# Patient Record
Sex: FEMALE | Race: OTHER | Employment: FULL TIME | URBAN - METROPOLITAN AREA
[De-identification: names, ages, dates, MRNs, and addresses within clinical notes are randomized per-mention and may not be internally consistent; named-entity substitution may affect disease eponyms.]

---

## 2017-10-02 ENCOUNTER — HOSPITAL ENCOUNTER (EMERGENCY)
Age: 32
Discharge: HOME | End: 2017-10-03
Attending: EMERGENCY MEDICINE
Payer: MEDICARE

## 2017-10-02 VITALS
HEIGHT: 61 IN | DIASTOLIC BLOOD PRESSURE: 85 MMHG | SYSTOLIC BLOOD PRESSURE: 124 MMHG | RESPIRATION RATE: 17 BRPM | HEART RATE: 92 BPM | TEMPERATURE: 98.2 F | BODY MASS INDEX: 34.93 KG/M2 | WEIGHT: 185 LBS | OXYGEN SATURATION: 100 %

## 2017-10-02 DIAGNOSIS — T30.0 BURN: Primary | ICD-10-CM

## 2017-10-02 DIAGNOSIS — R79.82 ELEVATED C-REACTIVE PROTEIN (CRP): ICD-10-CM

## 2017-10-02 DIAGNOSIS — L03.115 CELLULITIS OF RIGHT LOWER EXTREMITY: ICD-10-CM

## 2017-10-02 PROCEDURE — 86140 C-REACTIVE PROTEIN: CPT

## 2017-10-02 PROCEDURE — 80048 BASIC METABOLIC PNL TOTAL CA: CPT

## 2017-10-02 PROCEDURE — 99283 EMERGENCY DEPT VISIT LOW MDM: CPT

## 2017-10-02 PROCEDURE — 85025 COMPLETE CBC W/AUTO DIFF WBC: CPT

## 2017-10-02 ASSESSMENT — PAIN DESCRIPTION - ORIENTATION: ORIENTATION: RIGHT

## 2017-10-02 ASSESSMENT — PAIN SCALES - GENERAL: PAINLEVEL_OUTOF10: 8

## 2017-10-02 ASSESSMENT — PAIN DESCRIPTION - LOCATION: LOCATION: FOOT

## 2017-10-02 ASSESSMENT — PAIN DESCRIPTION - PAIN TYPE: TYPE: ACUTE PAIN
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
## 2017-10-03 LAB
ABSOLUTE EOS #: 0.5 K/UL (ref 0–0.4)
ABSOLUTE LYMPH #: 2.4 K/UL (ref 1–4.8)
ABSOLUTE MONO #: 0.5 K/UL (ref 0.1–1.2)
ANION GAP SERPL CALCULATED.3IONS-SCNC: 10 MMOL/L (ref 9–17)
BASOPHILS # BLD: 0 %
BASOPHILS ABSOLUTE: 0 K/UL (ref 0–0.2)
BUN BLDV-MCNC: 11 MG/DL (ref 6–20)
BUN/CREAT BLD: ABNORMAL (ref 9–20)
C-REACTIVE PROTEIN: 18.3 MG/L (ref 0–5)
CALCIUM SERPL-MCNC: 8.4 MG/DL (ref 8.6–10.4)
CHLORIDE BLD-SCNC: 104 MMOL/L (ref 98–107)
CO2: 26 MMOL/L (ref 20–31)
CREAT SERPL-MCNC: 0.65 MG/DL (ref 0.5–0.9)
DIFFERENTIAL TYPE: ABNORMAL
EOSINOPHILS RELATIVE PERCENT: 5 %
GFR AFRICAN AMERICAN: >60 ML/MIN
GFR NON-AFRICAN AMERICAN: >60 ML/MIN
GFR SERPL CREATININE-BSD FRML MDRD: ABNORMAL ML/MIN/{1.73_M2}
GFR SERPL CREATININE-BSD FRML MDRD: ABNORMAL ML/MIN/{1.73_M2}
GLUCOSE BLD-MCNC: 84 MG/DL (ref 70–99)
HCT VFR BLD CALC: 33.6 % (ref 36–46)
HEMOGLOBIN: 10.9 G/DL (ref 12–16)
LYMPHOCYTES # BLD: 25 %
MCH RBC QN AUTO: 26.3 PG (ref 26–34)
MCHC RBC AUTO-ENTMCNC: 32.3 G/DL (ref 31–37)
MCV RBC AUTO: 81.5 FL (ref 80–100)
MONOCYTES # BLD: 6 %
PDW BLD-RTO: 17.1 % (ref 12.5–15.4)
PLATELET # BLD: 370 K/UL (ref 140–450)
PLATELET ESTIMATE: ABNORMAL
PMV BLD AUTO: 8.2 FL (ref 6–12)
POTASSIUM SERPL-SCNC: 3.7 MMOL/L (ref 3.7–5.3)
RBC # BLD: 4.12 M/UL (ref 4–5.2)
RBC # BLD: ABNORMAL 10*6/UL
SEG NEUTROPHILS: 64 %
SEGMENTED NEUTROPHILS ABSOLUTE COUNT: 6.3 K/UL (ref 1.8–7.7)
SODIUM BLD-SCNC: 140 MMOL/L (ref 135–144)
WBC # BLD: 9.8 K/UL (ref 3.5–11)
WBC # BLD: ABNORMAL 10*3/UL

## 2017-10-03 PROCEDURE — 6370000000 HC RX 637 (ALT 250 FOR IP): Performed by: EMERGENCY MEDICINE

## 2017-10-03 RX ORDER — CEPHALEXIN 500 MG/1
500 CAPSULE ORAL ONCE
Status: COMPLETED | OUTPATIENT
Start: 2017-10-03 | End: 2017-10-03

## 2017-10-03 RX ORDER — SULFAMETHOXAZOLE AND TRIMETHOPRIM 800; 160 MG/1; MG/1
1 TABLET ORAL 2 TIMES DAILY
Qty: 14 TABLET | Refills: 0 | Status: SHIPPED | OUTPATIENT
Start: 2017-10-03 | End: 2017-10-10

## 2017-10-03 RX ORDER — CEPHALEXIN 500 MG/1
500 CAPSULE ORAL 4 TIMES DAILY
Qty: 28 CAPSULE | Refills: 0 | Status: SHIPPED | OUTPATIENT
Start: 2017-10-03 | End: 2017-10-10

## 2017-10-03 RX ORDER — BACITRACIN ZINC AND POLYMYXIN B SULFATE 500; 1000 [USP'U]/G; [USP'U]/G
OINTMENT TOPICAL
Qty: 15 G | Refills: 0 | Status: SHIPPED | OUTPATIENT
Start: 2017-10-03 | End: 2017-10-10

## 2017-10-03 RX ORDER — SULFAMETHOXAZOLE AND TRIMETHOPRIM 800; 160 MG/1; MG/1
1 TABLET ORAL ONCE
Status: COMPLETED | OUTPATIENT
Start: 2017-10-03 | End: 2017-10-03

## 2017-10-03 RX ADMIN — SULFAMETHOXAZOLE AND TRIMETHOPRIM 1 TABLET: 800; 160 TABLET ORAL at 01:09

## 2017-10-03 RX ADMIN — CEPHALEXIN 500 MG: 500 CAPSULE ORAL at 01:09

## 2017-10-03 ASSESSMENT — ENCOUNTER SYMPTOMS
VOMITING: 0
SHORTNESS OF BREATH: 0
NAUSEA: 0
COUGH: 0
ABDOMINAL PAIN: 0
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
## 2017-10-03 NOTE — ED PROVIDER NOTES
[facility] ED     Emergency Department     Faculty Attestation        I performed a history and physical examination of the patient and discussed management with the resident. I reviewed the residents note and agree with the documented findings and plan of care. Any areas of disagreement are noted on the chart. I was personally present for the key portions of any procedures. I have documented in the chart those procedures where I was not present during the key portions. I have reviewed the emergency nurses triage note. I agree with the chief complaint, past medical history, past surgical history, allergies, medications, social and family history as documented unless otherwise noted below. For mid-level providers such as nurse practitioners as well as physicians assistants:    I have personally seen and evaluated the patient. I find the patient's history and physical exam are consistent with NP/PA documentation. I agree with the care provided, treatment rendered, disposition, & follow-up plan. Additional findings are as noted. Vital Signs: /85  Pulse 92  Temp 98.2 °F (36.8 °C) (Oral)   Resp 17  Ht 5' 1\" (1.549 m)  Wt 185 lb (83.9 kg)  LMP 10/02/2017 (Exact Date)  SpO2 100%  BMI 34.96 kg/m2  PCP:  No primary care provider on file. Pertinent Comments:           Critical Care  None         Note, if the patient's blood pressure was elevated, and they have no history of hypertension, they were informed of the following: The patient may have Pre-hypertension/Hypertension: The patient has been informed that they may have pre-hypertension or Hypertension based on a blood pressure reading in the emergency department.  I recommend that the patient call the primary care provider listed on their discharge instructions or a physician of their choice this week to arrange follow up for further evaluation of possible pre-hypertension or Hypertension. (Please note that portions of this note were completed with a voice recognition program.  Efforts were made to edit the dictations but occasionally words are mis-transcribed. )    Jasson Mccain MD  Attending Emergency Medicine Physician             Mona Cam MD  10/02/17 6722
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
## 2018-08-29 ENCOUNTER — APPOINTMENT (OUTPATIENT)
Dept: GENERAL RADIOLOGY | Age: 33
End: 2018-08-29

## 2018-08-29 ENCOUNTER — HOSPITAL ENCOUNTER (EMERGENCY)
Age: 33
Discharge: HOME | End: 2018-08-29
Attending: EMERGENCY MEDICINE

## 2018-08-29 VITALS
BODY MASS INDEX: 40.44 KG/M2 | HEART RATE: 100 BPM | DIASTOLIC BLOOD PRESSURE: 67 MMHG | TEMPERATURE: 98.2 F | HEIGHT: 60 IN | OXYGEN SATURATION: 99 % | WEIGHT: 206 LBS | SYSTOLIC BLOOD PRESSURE: 106 MMHG | RESPIRATION RATE: 17 BRPM

## 2018-08-29 DIAGNOSIS — S62.101A CLOSED FRACTURE OF RIGHT WRIST, INITIAL ENCOUNTER: Primary | ICD-10-CM

## 2018-08-29 PROCEDURE — 99283 EMERGENCY DEPT VISIT LOW MDM: CPT

## 2018-08-29 PROCEDURE — 29125 APPL SHORT ARM SPLINT STATIC: CPT

## 2018-08-29 PROCEDURE — 73110 X-RAY EXAM OF WRIST: CPT

## 2018-08-29 PROCEDURE — 6370000000 HC RX 637 (ALT 250 FOR IP): Performed by: PHYSICIAN ASSISTANT

## 2018-08-29 RX ORDER — ACETAMINOPHEN AND CODEINE PHOSPHATE 300; 30 MG/1; MG/1
1 TABLET ORAL 3 TIMES DAILY PRN
Qty: 9 TABLET | Refills: 0 | Status: SHIPPED | OUTPATIENT
Start: 2018-08-29 | End: 2018-09-01

## 2018-08-29 RX ORDER — IBUPROFEN 600 MG/1
600 TABLET ORAL ONCE
Status: COMPLETED | OUTPATIENT
Start: 2018-08-29 | End: 2018-08-29

## 2018-08-29 RX ORDER — IBUPROFEN 600 MG/1
600 TABLET ORAL EVERY 6 HOURS PRN
Qty: 30 TABLET | Refills: 0 | Status: SHIPPED | OUTPATIENT
Start: 2018-08-29 | End: 2021-06-01 | Stop reason: HOSPADM

## 2018-08-29 RX ADMIN — IBUPROFEN 600 MG: 600 TABLET ORAL at 16:48

## 2018-08-29 ASSESSMENT — PAIN DESCRIPTION - ORIENTATION: ORIENTATION: RIGHT

## 2018-08-29 ASSESSMENT — PAIN DESCRIPTION - DESCRIPTORS: DESCRIPTORS: SHARP;THROBBING

## 2018-08-29 ASSESSMENT — PAIN SCALES - GENERAL: PAINLEVEL_OUTOF10: 7

## 2018-08-29 ASSESSMENT — PAIN DESCRIPTION - PAIN TYPE: TYPE: ACUTE PAIN

## 2018-08-29 ASSESSMENT — PAIN DESCRIPTION - LOCATION: LOCATION: WRIST
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
## 2018-08-30 ENCOUNTER — OFFICE VISIT (OUTPATIENT)
Dept: ORTHOPEDIC SURGERY | Age: 33
End: 2018-08-30

## 2018-08-30 DIAGNOSIS — S62.021A CLOSED COMMINUTED FRACTURE OF WAIST OF SCAPHOID OF RIGHT WRIST, INITIAL ENCOUNTER: ICD-10-CM

## 2018-08-30 DIAGNOSIS — M25.531 RIGHT WRIST PAIN: Primary | ICD-10-CM

## 2018-08-30 PROCEDURE — 99024 POSTOP FOLLOW-UP VISIT: CPT | Performed by: ORTHOPAEDIC SURGERY

## 2019-11-14 ENCOUNTER — APPOINTMENT (OUTPATIENT)
Dept: GENERAL RADIOLOGY | Age: 34
End: 2019-11-14
Payer: MEDICAID

## 2019-11-14 ENCOUNTER — HOSPITAL ENCOUNTER (EMERGENCY)
Age: 34
Discharge: HOME | End: 2019-11-14
Attending: EMERGENCY MEDICINE
Payer: MEDICAID

## 2019-11-14 VITALS
DIASTOLIC BLOOD PRESSURE: 87 MMHG | HEART RATE: 94 BPM | TEMPERATURE: 98.6 F | BODY MASS INDEX: 40.23 KG/M2 | OXYGEN SATURATION: 99 % | HEIGHT: 60 IN | RESPIRATION RATE: 18 BRPM | SYSTOLIC BLOOD PRESSURE: 124 MMHG

## 2019-11-14 DIAGNOSIS — R60.0 LEG EDEMA: Primary | ICD-10-CM

## 2019-11-14 LAB
-: NORMAL
ABSOLUTE EOS #: 0.24 K/UL (ref 0–0.44)
ABSOLUTE IMMATURE GRANULOCYTE: 0.06 K/UL (ref 0–0.3)
ABSOLUTE LYMPH #: 2.44 K/UL (ref 1.1–3.7)
ABSOLUTE MONO #: 0.81 K/UL (ref 0.1–1.2)
ALBUMIN SERPL-MCNC: 3.5 G/DL (ref 3.5–5.2)
ALBUMIN/GLOBULIN RATIO: 1.1 (ref 1–2.5)
ALP BLD-CCNC: 94 U/L (ref 35–104)
ALT SERPL-CCNC: 33 U/L (ref 5–33)
AMORPHOUS: NORMAL
ANION GAP SERPL CALCULATED.3IONS-SCNC: 12 MMOL/L (ref 9–17)
AST SERPL-CCNC: 25 U/L
BACTERIA: NORMAL
BASOPHILS # BLD: 0 % (ref 0–2)
BASOPHILS ABSOLUTE: <0.03 K/UL (ref 0–0.2)
BILIRUB SERPL-MCNC: <0.1 MG/DL (ref 0.3–1.2)
BILIRUBIN URINE: NEGATIVE
BNP INTERPRETATION: NORMAL
BUN BLDV-MCNC: 10 MG/DL (ref 6–20)
BUN/CREAT BLD: ABNORMAL (ref 9–20)
CALCIUM SERPL-MCNC: 8.5 MG/DL (ref 8.6–10.4)
CASTS UA: NORMAL /LPF (ref 0–8)
CHLORIDE BLD-SCNC: 105 MMOL/L (ref 98–107)
CO2: 22 MMOL/L (ref 20–31)
COLOR: YELLOW
COMMENT UA: ABNORMAL
CREAT SERPL-MCNC: 0.56 MG/DL (ref 0.5–0.9)
CRYSTALS, UA: NORMAL /HPF
DIFFERENTIAL TYPE: ABNORMAL
EOSINOPHILS RELATIVE PERCENT: 3 % (ref 1–4)
EPITHELIAL CELLS UA: NORMAL /HPF (ref 0–5)
GFR AFRICAN AMERICAN: >60 ML/MIN
GFR NON-AFRICAN AMERICAN: >60 ML/MIN
GFR SERPL CREATININE-BSD FRML MDRD: ABNORMAL ML/MIN/{1.73_M2}
GFR SERPL CREATININE-BSD FRML MDRD: ABNORMAL ML/MIN/{1.73_M2}
GLUCOSE BLD-MCNC: 81 MG/DL (ref 70–99)
GLUCOSE URINE: NEGATIVE
HCG(URINE) PREGNANCY TEST: NEGATIVE
HCT VFR BLD CALC: 31.5 % (ref 36.3–47.1)
HEMOGLOBIN: 9.3 G/DL (ref 11.9–15.1)
IMMATURE GRANULOCYTES: 1 %
KETONES, URINE: NEGATIVE
LEUKOCYTE ESTERASE, URINE: ABNORMAL
LYMPHOCYTES # BLD: 33 % (ref 24–43)
MCH RBC QN AUTO: 23.2 PG (ref 25.2–33.5)
MCHC RBC AUTO-ENTMCNC: 29.5 G/DL (ref 28.4–34.8)
MCV RBC AUTO: 78.6 FL (ref 82.6–102.9)
MONOCYTES # BLD: 11 % (ref 3–12)
MUCUS: NORMAL
NITRITE, URINE: NEGATIVE
NRBC AUTOMATED: 0 PER 100 WBC
OTHER OBSERVATIONS UA: NORMAL
PDW BLD-RTO: 16.1 % (ref 11.8–14.4)
PH UA: 6.5 (ref 5–8)
PLATELET # BLD: 362 K/UL (ref 138–453)
PLATELET ESTIMATE: ABNORMAL
PMV BLD AUTO: 10.3 FL (ref 8.1–13.5)
POTASSIUM SERPL-SCNC: 3.9 MMOL/L (ref 3.7–5.3)
PRO-BNP: 21 PG/ML
PROTEIN UA: NEGATIVE
RBC # BLD: 4.01 M/UL (ref 3.95–5.11)
RBC # BLD: ABNORMAL 10*6/UL
RBC UA: NORMAL /HPF (ref 0–4)
RENAL EPITHELIAL, UA: NORMAL /HPF
SEG NEUTROPHILS: 52 % (ref 36–65)
SEGMENTED NEUTROPHILS ABSOLUTE COUNT: 3.83 K/UL (ref 1.5–8.1)
SODIUM BLD-SCNC: 139 MMOL/L (ref 135–144)
SPECIFIC GRAVITY UA: 1.02 (ref 1–1.03)
TOTAL PROTEIN: 6.8 G/DL (ref 6.4–8.3)
TRICHOMONAS: NORMAL
TROPONIN INTERP: NORMAL
TROPONIN T: NORMAL NG/ML
TROPONIN, HIGH SENSITIVITY: <6 NG/L (ref 0–14)
TSH SERPL DL<=0.05 MIU/L-ACNC: 2.83 MIU/L (ref 0.3–5)
TURBIDITY: CLEAR
URINE HGB: NEGATIVE
UROBILINOGEN, URINE: NORMAL
WBC # BLD: 7.4 K/UL (ref 3.5–11.3)
WBC # BLD: ABNORMAL 10*3/UL
WBC UA: NORMAL /HPF (ref 0–5)
YEAST: NORMAL

## 2019-11-14 PROCEDURE — 81025 URINE PREGNANCY TEST: CPT

## 2019-11-14 PROCEDURE — 80053 COMPREHEN METABOLIC PANEL: CPT

## 2019-11-14 PROCEDURE — 84443 ASSAY THYROID STIM HORMONE: CPT

## 2019-11-14 PROCEDURE — 83880 ASSAY OF NATRIURETIC PEPTIDE: CPT

## 2019-11-14 PROCEDURE — 93970 EXTREMITY STUDY: CPT

## 2019-11-14 PROCEDURE — 93005 ELECTROCARDIOGRAM TRACING: CPT | Performed by: STUDENT IN AN ORGANIZED HEALTH CARE EDUCATION/TRAINING PROGRAM

## 2019-11-14 PROCEDURE — 84484 ASSAY OF TROPONIN QUANT: CPT

## 2019-11-14 PROCEDURE — 99285 EMERGENCY DEPT VISIT HI MDM: CPT

## 2019-11-14 PROCEDURE — 71046 X-RAY EXAM CHEST 2 VIEWS: CPT

## 2019-11-14 PROCEDURE — 81001 URINALYSIS AUTO W/SCOPE: CPT

## 2019-11-14 PROCEDURE — 85025 COMPLETE CBC W/AUTO DIFF WBC: CPT

## 2019-11-14 RX ORDER — METHADONE HYDROCHLORIDE 10 MG/1
120 TABLET ORAL DAILY
COMMUNITY

## 2019-11-14 ASSESSMENT — PAIN DESCRIPTION - DESCRIPTORS: DESCRIPTORS: ACHING

## 2019-11-14 ASSESSMENT — ENCOUNTER SYMPTOMS
VOMITING: 0
CHEST TIGHTNESS: 0
WHEEZING: 0
CONSTIPATION: 0
TROUBLE SWALLOWING: 0
DIARRHEA: 0
ABDOMINAL PAIN: 0
COUGH: 0
NAUSEA: 0
SHORTNESS OF BREATH: 0

## 2019-11-14 ASSESSMENT — PAIN DESCRIPTION - LOCATION: LOCATION: LEG;FOOT

## 2019-11-14 ASSESSMENT — PAIN DESCRIPTION - PAIN TYPE: TYPE: ACUTE PAIN

## 2019-11-14 ASSESSMENT — PAIN DESCRIPTION - ORIENTATION: ORIENTATION: RIGHT;LEFT

## 2019-11-14 ASSESSMENT — PAIN DESCRIPTION - FREQUENCY: FREQUENCY: CONTINUOUS

## 2019-11-14 ASSESSMENT — PAIN SCALES - GENERAL: PAINLEVEL_OUTOF10: 8

## 2019-11-16 LAB
EKG ATRIAL RATE: 89 BPM
EKG P AXIS: 10 DEGREES
EKG P-R INTERVAL: 140 MS
EKG Q-T INTERVAL: 380 MS
EKG QRS DURATION: 80 MS
EKG QTC CALCULATION (BAZETT): 462 MS
EKG R AXIS: 3 DEGREES
EKG T AXIS: 16 DEGREES
EKG VENTRICULAR RATE: 89 BPM

## 2019-11-16 PROCEDURE — 93010 ELECTROCARDIOGRAM REPORT: CPT | Performed by: INTERNAL MEDICINE

## 2020-09-15 PROBLEM — D64.9 ANEMIA: Status: ACTIVE | Noted: 2020-09-15

## 2020-09-15 PROBLEM — F11.988 OPIOID USE, UNSPECIFIED WITH OTHER OPIOID-INDUCED DISORDER (HCC): Status: ACTIVE | Noted: 2020-09-15

## 2020-09-15 PROBLEM — E66.01 MORBID OBESITY WITH BMI OF 40.0-44.9, ADULT (HCC): Status: ACTIVE | Noted: 2020-09-15

## 2020-09-16 PROBLEM — F11.91 HISTORY OF HEROIN USE: Status: ACTIVE | Noted: 2020-09-16

## 2020-10-19 ENCOUNTER — HOSPITAL ENCOUNTER (OUTPATIENT)
Age: 35
Setting detail: OBSERVATION
Discharge: HOME | End: 2020-10-21
Attending: EMERGENCY MEDICINE | Admitting: INTERNAL MEDICINE
Payer: MEDICAID

## 2020-10-19 ENCOUNTER — APPOINTMENT (OUTPATIENT)
Dept: GENERAL RADIOLOGY | Age: 35
End: 2020-10-19
Payer: MEDICAID

## 2020-10-19 PROBLEM — D64.9 SYMPTOMATIC ANEMIA: Status: ACTIVE | Noted: 2020-10-19

## 2020-10-19 PROBLEM — M79.89 LEG SWELLING: Status: ACTIVE | Noted: 2020-10-19

## 2020-10-19 PROBLEM — R06.02 SOB (SHORTNESS OF BREATH): Status: ACTIVE | Noted: 2020-10-19

## 2020-10-19 LAB
ABSOLUTE EOS #: 0.16 K/UL (ref 0–0.4)
ABSOLUTE IMMATURE GRANULOCYTE: ABNORMAL K/UL (ref 0–0.3)
ABSOLUTE LYMPH #: 2.45 K/UL (ref 1–4.8)
ABSOLUTE MONO #: 0.4 K/UL (ref 0.1–1.3)
ALBUMIN SERPL-MCNC: 3.7 G/DL (ref 3.5–5.2)
ALBUMIN/GLOBULIN RATIO: ABNORMAL (ref 1–2.5)
ALP BLD-CCNC: 103 U/L (ref 35–104)
ALT SERPL-CCNC: 15 U/L (ref 5–33)
ANION GAP SERPL CALCULATED.3IONS-SCNC: 9 MMOL/L (ref 9–17)
AST SERPL-CCNC: 15 U/L
BASOPHILS # BLD: 1 % (ref 0–2)
BASOPHILS ABSOLUTE: 0.08 K/UL (ref 0–0.2)
BILIRUB SERPL-MCNC: <0.15 MG/DL (ref 0.3–1.2)
BILIRUBIN DIRECT: <0.08 MG/DL
BILIRUBIN, INDIRECT: ABNORMAL MG/DL (ref 0–1)
BNP INTERPRETATION: NORMAL
BUN BLDV-MCNC: 9 MG/DL (ref 6–20)
BUN/CREAT BLD: ABNORMAL (ref 9–20)
CALCIUM SERPL-MCNC: 8.4 MG/DL (ref 8.6–10.4)
CHLORIDE BLD-SCNC: 103 MMOL/L (ref 98–107)
CO2: 25 MMOL/L (ref 20–31)
CREAT SERPL-MCNC: 0.59 MG/DL (ref 0.5–0.9)
DIFFERENTIAL TYPE: ABNORMAL
EOSINOPHILS RELATIVE PERCENT: 2 % (ref 0–4)
GFR AFRICAN AMERICAN: >60 ML/MIN
GFR NON-AFRICAN AMERICAN: >60 ML/MIN
GFR SERPL CREATININE-BSD FRML MDRD: ABNORMAL ML/MIN/{1.73_M2}
GFR SERPL CREATININE-BSD FRML MDRD: ABNORMAL ML/MIN/{1.73_M2}
GLOBULIN: ABNORMAL G/DL (ref 1.5–3.8)
GLUCOSE BLD-MCNC: 116 MG/DL (ref 70–99)
HCT VFR BLD CALC: 23.7 % (ref 36–46)
HEMOGLOBIN: 7.2 G/DL (ref 12–16)
IMMATURE GRANULOCYTES: ABNORMAL %
LIPASE: 15 U/L (ref 13–60)
LYMPHOCYTES # BLD: 31 % (ref 24–44)
MAGNESIUM: 1.9 MG/DL (ref 1.6–2.6)
MCH RBC QN AUTO: 18.2 PG (ref 26–34)
MCHC RBC AUTO-ENTMCNC: 30.4 G/DL (ref 31–37)
MCV RBC AUTO: 59.8 FL (ref 80–100)
MONOCYTES # BLD: 5 % (ref 1–7)
MORPHOLOGY: ABNORMAL
NRBC AUTOMATED: ABNORMAL PER 100 WBC
PDW BLD-RTO: 20.5 % (ref 11.5–14.9)
PLATELET # BLD: 358 K/UL (ref 150–450)
PLATELET ESTIMATE: ABNORMAL
PMV BLD AUTO: 8.3 FL (ref 6–12)
POTASSIUM SERPL-SCNC: 3.6 MMOL/L (ref 3.7–5.3)
PRO-BNP: 32 PG/ML
RBC # BLD: 3.96 M/UL (ref 4–5.2)
RBC # BLD: ABNORMAL 10*6/UL
SEG NEUTROPHILS: 61 % (ref 36–66)
SEGMENTED NEUTROPHILS ABSOLUTE COUNT: 4.81 K/UL (ref 1.3–9.1)
SODIUM BLD-SCNC: 137 MMOL/L (ref 135–144)
TOTAL CK: 223 U/L (ref 26–192)
TOTAL PROTEIN: 7 G/DL (ref 6.4–8.3)
TROPONIN INTERP: NORMAL
TROPONIN INTERP: NORMAL
TROPONIN T: NORMAL NG/ML
TROPONIN T: NORMAL NG/ML
TROPONIN, HIGH SENSITIVITY: <6 NG/L (ref 0–14)
TROPONIN, HIGH SENSITIVITY: <6 NG/L (ref 0–14)
WBC # BLD: 7.9 K/UL (ref 3.5–11)
WBC # BLD: ABNORMAL 10*3/UL

## 2020-10-19 PROCEDURE — 80076 HEPATIC FUNCTION PANEL: CPT

## 2020-10-19 PROCEDURE — G0378 HOSPITAL OBSERVATION PER HR: HCPCS

## 2020-10-19 PROCEDURE — 84484 ASSAY OF TROPONIN QUANT: CPT

## 2020-10-19 PROCEDURE — 93970 EXTREMITY STUDY: CPT

## 2020-10-19 PROCEDURE — 96372 THER/PROPH/DIAG INJ SC/IM: CPT

## 2020-10-19 PROCEDURE — 83690 ASSAY OF LIPASE: CPT

## 2020-10-19 PROCEDURE — 2580000003 HC RX 258: Performed by: NURSE PRACTITIONER

## 2020-10-19 PROCEDURE — 80048 BASIC METABOLIC PNL TOTAL CA: CPT

## 2020-10-19 PROCEDURE — 99284 EMERGENCY DEPT VISIT MOD MDM: CPT

## 2020-10-19 PROCEDURE — 71046 X-RAY EXAM CHEST 2 VIEWS: CPT

## 2020-10-19 PROCEDURE — 36415 COLL VENOUS BLD VENIPUNCTURE: CPT

## 2020-10-19 PROCEDURE — 93005 ELECTROCARDIOGRAM TRACING: CPT | Performed by: EMERGENCY MEDICINE

## 2020-10-19 PROCEDURE — 99285 EMERGENCY DEPT VISIT HI MDM: CPT

## 2020-10-19 PROCEDURE — 82550 ASSAY OF CK (CPK): CPT

## 2020-10-19 PROCEDURE — 85045 AUTOMATED RETICULOCYTE COUNT: CPT

## 2020-10-19 PROCEDURE — 85025 COMPLETE CBC W/AUTO DIFF WBC: CPT

## 2020-10-19 PROCEDURE — 83880 ASSAY OF NATRIURETIC PEPTIDE: CPT

## 2020-10-19 PROCEDURE — 6360000002 HC RX W HCPCS: Performed by: NURSE PRACTITIONER

## 2020-10-19 PROCEDURE — 83735 ASSAY OF MAGNESIUM: CPT

## 2020-10-19 RX ORDER — MAGNESIUM SULFATE 1 G/100ML
1 INJECTION INTRAVENOUS PRN
Status: DISCONTINUED | OUTPATIENT
Start: 2020-10-19 | End: 2020-10-21 | Stop reason: HOSPADM

## 2020-10-19 RX ORDER — NICOTINE 21 MG/24HR
1 PATCH, TRANSDERMAL 24 HOURS TRANSDERMAL DAILY PRN
Status: DISCONTINUED | OUTPATIENT
Start: 2020-10-19 | End: 2020-10-21 | Stop reason: HOSPADM

## 2020-10-19 RX ORDER — SODIUM CHLORIDE 0.9 % (FLUSH) 0.9 %
10 SYRINGE (ML) INJECTION EVERY 12 HOURS SCHEDULED
Status: DISCONTINUED | OUTPATIENT
Start: 2020-10-19 | End: 2020-10-21 | Stop reason: HOSPADM

## 2020-10-19 RX ORDER — POTASSIUM CHLORIDE 20 MEQ/1
40 TABLET, EXTENDED RELEASE ORAL PRN
Status: DISCONTINUED | OUTPATIENT
Start: 2020-10-19 | End: 2020-10-21 | Stop reason: HOSPADM

## 2020-10-19 RX ORDER — SODIUM CHLORIDE 0.9 % (FLUSH) 0.9 %
10 SYRINGE (ML) INJECTION PRN
Status: DISCONTINUED | OUTPATIENT
Start: 2020-10-19 | End: 2020-10-21 | Stop reason: HOSPADM

## 2020-10-19 RX ORDER — POTASSIUM CHLORIDE 7.45 MG/ML
10 INJECTION INTRAVENOUS PRN
Status: DISCONTINUED | OUTPATIENT
Start: 2020-10-19 | End: 2020-10-21 | Stop reason: HOSPADM

## 2020-10-19 RX ORDER — ACETAMINOPHEN 325 MG/1
650 TABLET ORAL EVERY 6 HOURS PRN
Status: DISCONTINUED | OUTPATIENT
Start: 2020-10-19 | End: 2020-10-21 | Stop reason: HOSPADM

## 2020-10-19 RX ORDER — ACETAMINOPHEN 650 MG/1
650 SUPPOSITORY RECTAL EVERY 6 HOURS PRN
Status: DISCONTINUED | OUTPATIENT
Start: 2020-10-19 | End: 2020-10-21 | Stop reason: HOSPADM

## 2020-10-19 RX ORDER — POLYETHYLENE GLYCOL 3350 17 G/17G
17 POWDER, FOR SOLUTION ORAL DAILY PRN
Status: DISCONTINUED | OUTPATIENT
Start: 2020-10-19 | End: 2020-10-21 | Stop reason: HOSPADM

## 2020-10-19 RX ADMIN — ENOXAPARIN SODIUM 30 MG: 30 INJECTION SUBCUTANEOUS at 23:51

## 2020-10-19 RX ADMIN — Medication 10 ML: at 23:19

## 2020-10-19 ASSESSMENT — ENCOUNTER SYMPTOMS
COUGH: 0
DIARRHEA: 0
VOMITING: 0
SHORTNESS OF BREATH: 1
BACK PAIN: 0
ABDOMINAL PAIN: 1

## 2020-10-19 ASSESSMENT — PAIN SCALES - GENERAL: PAINLEVEL_OUTOF10: 6

## 2020-10-20 ENCOUNTER — APPOINTMENT (OUTPATIENT)
Dept: CT IMAGING | Age: 35
End: 2020-10-20
Payer: MEDICAID

## 2020-10-20 LAB
-: ABNORMAL
ABO/RH: NORMAL
ABSOLUTE RETIC #: 0.1 M/UL (ref 0.02–0.1)
AMORPHOUS: ABNORMAL
ANION GAP SERPL CALCULATED.3IONS-SCNC: 8 MMOL/L (ref 9–17)
ANTIBODY SCREEN: NEGATIVE
ARM BAND NUMBER: NORMAL
BACTERIA: ABNORMAL
BILIRUBIN URINE: NEGATIVE
BLOOD BANK COMMENT: NORMAL
BUN BLDV-MCNC: 9 MG/DL (ref 6–20)
BUN/CREAT BLD: ABNORMAL (ref 9–20)
CALCIUM SERPL-MCNC: 8.3 MG/DL (ref 8.6–10.4)
CASTS UA: ABNORMAL /LPF
CHLORIDE BLD-SCNC: 105 MMOL/L (ref 98–107)
CO2: 25 MMOL/L (ref 20–31)
COLOR: YELLOW
COMMENT UA: ABNORMAL
CREAT SERPL-MCNC: 0.54 MG/DL (ref 0.5–0.9)
CRYSTALS, UA: ABNORMAL /HPF
EKG ATRIAL RATE: 83 BPM
EKG P AXIS: 6 DEGREES
EKG P-R INTERVAL: 148 MS
EKG Q-T INTERVAL: 410 MS
EKG QRS DURATION: 84 MS
EKG QTC CALCULATION (BAZETT): 481 MS
EKG R AXIS: 9 DEGREES
EKG T AXIS: 34 DEGREES
EKG VENTRICULAR RATE: 83 BPM
EPITHELIAL CELLS UA: ABNORMAL /HPF
EXPIRATION DATE: NORMAL
FERRITIN: 6 UG/L (ref 13–150)
FOLATE: 4.1 NG/ML
GFR AFRICAN AMERICAN: >60 ML/MIN
GFR NON-AFRICAN AMERICAN: >60 ML/MIN
GFR SERPL CREATININE-BSD FRML MDRD: ABNORMAL ML/MIN/{1.73_M2}
GFR SERPL CREATININE-BSD FRML MDRD: ABNORMAL ML/MIN/{1.73_M2}
GLUCOSE BLD-MCNC: 101 MG/DL (ref 70–99)
GLUCOSE URINE: NEGATIVE
HAPTOGLOBIN: 200 MG/DL (ref 30–200)
HCT VFR BLD CALC: 24.3 % (ref 36–46)
HCT VFR BLD CALC: 24.5 % (ref 36–46)
HEMOGLOBIN: 7.1 G/DL (ref 12–16)
HEMOGLOBIN: 7.2 G/DL (ref 12–16)
IMMATURE RETIC FRACT: ABNORMAL %
INR BLD: 1
IRON SATURATION: 8 % (ref 20–55)
IRON: 26 UG/DL (ref 37–145)
KETONES, URINE: NEGATIVE
LEUKOCYTE ESTERASE, URINE: ABNORMAL
LV EF: 55 %
LVEF MODALITY: NORMAL
MCH RBC QN AUTO: 17.6 PG (ref 26–34)
MCHC RBC AUTO-ENTMCNC: 29.5 G/DL (ref 31–37)
MCV RBC AUTO: 59.7 FL (ref 80–100)
MUCUS: ABNORMAL
NITRITE, URINE: NEGATIVE
NRBC AUTOMATED: ABNORMAL PER 100 WBC
OTHER OBSERVATIONS UA: ABNORMAL
PARTIAL THROMBOPLASTIN TIME: 32.7 SEC (ref 24–36)
PATHOLOGIST REVIEW: NORMAL
PATHOLOGIST REVIEW: NORMAL
PDW BLD-RTO: 21 % (ref 11.5–14.9)
PH UA: 6.5 (ref 5–8)
PLATELET # BLD: 349 K/UL (ref 150–450)
PMV BLD AUTO: 8.4 FL (ref 6–12)
POTASSIUM SERPL-SCNC: 3.9 MMOL/L (ref 3.7–5.3)
PROTEIN UA: NEGATIVE
PROTHROMBIN TIME: 13.3 SEC (ref 11.8–14.6)
RBC # BLD: 4.08 M/UL (ref 4–5.2)
RBC UA: ABNORMAL /HPF
RENAL EPITHELIAL, UA: ABNORMAL /HPF
RETIC %: 2.5 % (ref 0.5–2)
RETIC HEMOGLOBIN: ABNORMAL PG (ref 28.2–35.7)
SODIUM BLD-SCNC: 138 MMOL/L (ref 135–144)
SPECIFIC GRAVITY UA: 1.01 (ref 1–1.03)
TOTAL IRON BINDING CAPACITY: 331 UG/DL (ref 250–450)
TRICHOMONAS: ABNORMAL
TSH SERPL DL<=0.05 MIU/L-ACNC: 1.6 MIU/L (ref 0.3–5)
TURBIDITY: ABNORMAL
UNSATURATED IRON BINDING CAPACITY: 305 UG/DL (ref 112–347)
URINE HGB: NEGATIVE
UROBILINOGEN, URINE: NORMAL
VITAMIN B-12: 573 PG/ML (ref 232–1245)
WBC # BLD: 6.8 K/UL (ref 3.5–11)
WBC UA: ABNORMAL /HPF
YEAST: ABNORMAL

## 2020-10-20 PROCEDURE — 6360000002 HC RX W HCPCS: Performed by: NURSE PRACTITIONER

## 2020-10-20 PROCEDURE — 99255 IP/OBS CONSLTJ NEW/EST HI 80: CPT | Performed by: INTERNAL MEDICINE

## 2020-10-20 PROCEDURE — 83010 ASSAY OF HAPTOGLOBIN QUANT: CPT

## 2020-10-20 PROCEDURE — 6360000004 HC RX CONTRAST MEDICATION: Performed by: INTERNAL MEDICINE

## 2020-10-20 PROCEDURE — 82746 ASSAY OF FOLIC ACID SERUM: CPT

## 2020-10-20 PROCEDURE — 85014 HEMATOCRIT: CPT

## 2020-10-20 PROCEDURE — 83540 ASSAY OF IRON: CPT

## 2020-10-20 PROCEDURE — G0378 HOSPITAL OBSERVATION PER HR: HCPCS

## 2020-10-20 PROCEDURE — 84443 ASSAY THYROID STIM HORMONE: CPT

## 2020-10-20 PROCEDURE — 2580000003 HC RX 258: Performed by: NURSE PRACTITIONER

## 2020-10-20 PROCEDURE — 85610 PROTHROMBIN TIME: CPT

## 2020-10-20 PROCEDURE — 36415 COLL VENOUS BLD VENIPUNCTURE: CPT

## 2020-10-20 PROCEDURE — 6370000000 HC RX 637 (ALT 250 FOR IP): Performed by: INTERNAL MEDICINE

## 2020-10-20 PROCEDURE — 81001 URINALYSIS AUTO W/SCOPE: CPT

## 2020-10-20 PROCEDURE — 82607 VITAMIN B-12: CPT

## 2020-10-20 PROCEDURE — 96365 THER/PROPH/DIAG IV INF INIT: CPT

## 2020-10-20 PROCEDURE — 86901 BLOOD TYPING SEROLOGIC RH(D): CPT

## 2020-10-20 PROCEDURE — 82728 ASSAY OF FERRITIN: CPT

## 2020-10-20 PROCEDURE — 99223 1ST HOSP IP/OBS HIGH 75: CPT | Performed by: INTERNAL MEDICINE

## 2020-10-20 PROCEDURE — 96372 THER/PROPH/DIAG INJ SC/IM: CPT

## 2020-10-20 PROCEDURE — 85018 HEMOGLOBIN: CPT

## 2020-10-20 PROCEDURE — 6360000002 HC RX W HCPCS: Performed by: INTERNAL MEDICINE

## 2020-10-20 PROCEDURE — 93306 TTE W/DOPPLER COMPLETE: CPT

## 2020-10-20 PROCEDURE — 80048 BASIC METABOLIC PNL TOTAL CA: CPT

## 2020-10-20 PROCEDURE — 86900 BLOOD TYPING SEROLOGIC ABO: CPT

## 2020-10-20 PROCEDURE — 74177 CT ABD & PELVIS W/CONTRAST: CPT

## 2020-10-20 PROCEDURE — 96375 TX/PRO/DX INJ NEW DRUG ADDON: CPT

## 2020-10-20 PROCEDURE — 85730 THROMBOPLASTIN TIME PARTIAL: CPT

## 2020-10-20 PROCEDURE — 2580000003 HC RX 258: Performed by: INTERNAL MEDICINE

## 2020-10-20 PROCEDURE — 86850 RBC ANTIBODY SCREEN: CPT

## 2020-10-20 PROCEDURE — 85027 COMPLETE CBC AUTOMATED: CPT

## 2020-10-20 PROCEDURE — 83550 IRON BINDING TEST: CPT

## 2020-10-20 RX ORDER — PRAZOSIN HYDROCHLORIDE 1 MG/1
1 CAPSULE ORAL NIGHTLY
Status: DISCONTINUED | OUTPATIENT
Start: 2020-10-20 | End: 2020-10-21 | Stop reason: HOSPADM

## 2020-10-20 RX ORDER — FUROSEMIDE 10 MG/ML
20 INJECTION INTRAMUSCULAR; INTRAVENOUS ONCE
Status: COMPLETED | OUTPATIENT
Start: 2020-10-20 | End: 2020-10-20

## 2020-10-20 RX ORDER — SODIUM CHLORIDE 0.9 % (FLUSH) 0.9 %
10 SYRINGE (ML) INJECTION PRN
Status: DISCONTINUED | OUTPATIENT
Start: 2020-10-20 | End: 2020-10-21 | Stop reason: HOSPADM

## 2020-10-20 RX ORDER — 0.9 % SODIUM CHLORIDE 0.9 %
80 INTRAVENOUS SOLUTION INTRAVENOUS ONCE
Status: COMPLETED | OUTPATIENT
Start: 2020-10-20 | End: 2020-10-20

## 2020-10-20 RX ORDER — METHADONE HYDROCHLORIDE 10 MG/5ML
125 SOLUTION ORAL DAILY
Status: DISCONTINUED | OUTPATIENT
Start: 2020-10-20 | End: 2020-10-21 | Stop reason: HOSPADM

## 2020-10-20 RX ORDER — FAMOTIDINE 20 MG/1
20 TABLET, FILM COATED ORAL 2 TIMES DAILY
Status: DISCONTINUED | OUTPATIENT
Start: 2020-10-20 | End: 2020-10-21 | Stop reason: HOSPADM

## 2020-10-20 RX ORDER — ONDANSETRON 2 MG/ML
4 INJECTION INTRAMUSCULAR; INTRAVENOUS EVERY 6 HOURS PRN
Status: DISCONTINUED | OUTPATIENT
Start: 2020-10-20 | End: 2020-10-21 | Stop reason: HOSPADM

## 2020-10-20 RX ORDER — QUETIAPINE FUMARATE 50 MG/1
75 TABLET, FILM COATED ORAL NIGHTLY
Status: DISCONTINUED | OUTPATIENT
Start: 2020-10-20 | End: 2020-10-21 | Stop reason: HOSPADM

## 2020-10-20 RX ADMIN — FAMOTIDINE 20 MG: 20 TABLET ORAL at 11:17

## 2020-10-20 RX ADMIN — IOHEXOL 50 ML: 240 INJECTION, SOLUTION INTRATHECAL; INTRAVASCULAR; INTRAVENOUS; ORAL at 21:14

## 2020-10-20 RX ADMIN — Medication 10 ML: at 23:01

## 2020-10-20 RX ADMIN — SERTRALINE HYDROCHLORIDE 50 MG: 50 TABLET ORAL at 11:17

## 2020-10-20 RX ADMIN — FUROSEMIDE 20 MG: 10 INJECTION, SOLUTION INTRAMUSCULAR; INTRAVENOUS at 09:09

## 2020-10-20 RX ADMIN — Medication 10 ML: at 23:40

## 2020-10-20 RX ADMIN — QUETIAPINE FUMARATE 75 MG: 50 TABLET ORAL at 23:27

## 2020-10-20 RX ADMIN — ENOXAPARIN SODIUM 30 MG: 30 INJECTION SUBCUTANEOUS at 23:27

## 2020-10-20 RX ADMIN — IOPAMIDOL 75 ML: 755 INJECTION, SOLUTION INTRAVENOUS at 23:01

## 2020-10-20 RX ADMIN — Medication 10 ML: at 11:18

## 2020-10-20 RX ADMIN — SODIUM CHLORIDE 200 MG: 9 INJECTION, SOLUTION INTRAVENOUS at 11:19

## 2020-10-20 RX ADMIN — METHADONE HYDROCHLORIDE 125 MG: 10 SOLUTION ORAL at 13:14

## 2020-10-20 RX ADMIN — ENOXAPARIN SODIUM 30 MG: 30 INJECTION SUBCUTANEOUS at 09:10

## 2020-10-20 RX ADMIN — SODIUM CHLORIDE 80 ML: 9 INJECTION, SOLUTION INTRAVENOUS at 23:00

## 2020-10-20 RX ADMIN — FAMOTIDINE 20 MG: 20 TABLET ORAL at 23:27

## 2020-10-20 ASSESSMENT — PAIN SCALES - GENERAL
PAINLEVEL_OUTOF10: 0
PAINLEVEL_OUTOF10: 1

## 2020-10-20 ASSESSMENT — ENCOUNTER SYMPTOMS
ABDOMINAL PAIN: 0
SHORTNESS OF BREATH: 1
COUGH: 0

## 2020-10-20 NOTE — FLOWSHEET NOTE
10/20/20 1458   Encounter Summary   Services provided to: Patient   Support System Significant other   Continue Visiting   (10-20-20)   Complexity of Encounter Moderate   Length of Encounter 15 minutes   Spiritual Assessment Completed Yes   Spiritual/Episcopal   Type Spiritual support   Assessment Calm; Approachable; Anxious; Coping   Intervention Active listening;Explored feelings, thoughts, concerns;Prayer;Sustaining presence/ Ministry of presence   Outcome Comfort;Expressed gratitude; Hopeful

## 2020-10-20 NOTE — CARE COORDINATION
HGB CASE MANAGEMENT NOTE:    Admission Date:  10/19/2020 Homero Cantu is a 37 y.o.  female    Admitted for : Symptomatic anemia [D64.9]    Met with:  Patient    PCP:  None, Wants to be set up w/ Dr. Laurence Martinez, at [facility]:  [address]:  independently at home w/           Current Services PTA:  Yes, [facility] on [address], for Methadone    Is patient agreeable to VNS: No    Freedom of choice provided:  No    List of [address] provided: No    VNS chosen:  No    DME:  straight cane    Home Oxygen: No    Nebulizer: No    CPAP/BIPAP: No    Supplier: N/A    Potential Assistance Needed: Yes, Needs PCP, wants [place]    SNF needed: No    Freedom of choice and list provided: NA    Pharmacy:  [address] on [place]       Does Patient want to use MEDS to BEDS? No    Is patient currently receiving oral anticoagulation therapy? No    Is the Patient an [facility] with Readmission Risk Score greater than 14%? No  If yes, pt needs a follow up appointment made within 7 days. Family Members/Caregivers that pt would like involved in their care:    Yes    If yes, list name here:  , Maribeth Minors:               Is patient in Isolation/One on One/Altered Mental Status? No  If yes, skip next question. If no, would they like an I-Pad to  use? No  If yes, call [phone]. Discharge Plan:  10/20/20 [facility] Pt. Lives in 1 story home w/ . DME, Cane, [employer] [place], will need orders. Denies VNS. New PCP w/ Dr. Laurence Martinez, w/ apt made. CXR, Echo, PT/OT, HGB 7.2. Follows at [facility] on [address], for Methadone. Denies other needs.  Will Follow//KB                 Electronically signed by: Martha Kelley RN on 10/20/2020 at 10:01 AM

## 2020-10-20 NOTE — PROGRESS NOTES
Reached Dr. Akhil Rodriguez via [facility] message for hem/onc consult regarding severe iron deficient anemia. Dr. Akhil Rodriguez aware of consult.

## 2020-10-20 NOTE — CARE COORDINATION
Writer spoke to Dr. Joi Brito, in regards to Pt's request for a Shower Chair. Dr. Joi Brito does not feel the need at this time & will wait to see what therapy suggests. Pt is up per self around the room, without difficulty.

## 2020-10-20 NOTE — ED NOTES
Admission Dx: anemia    Pts Chief Complaints on Arrival: swelling     ADL's - Self-care    Pending Diagnostics:  n/a    Residence PTA: single story home    Special Considerations/Circumstances:  n/a    Vitals: Current vital signs:  /73   Pulse 85   Temp 98.4 °F (36.9 °C) (Oral)   Resp 17   Ht 5' 1\" (1.549 m)   Wt 270 lb (122.5 kg)   LMP 09/30/2020   SpO2 100%   BMI 51.02 kg/m²                [facility]  10/19/20 2240

## 2020-10-20 NOTE — PROGRESS NOTES
RN called [facility] to confirm methadone dose. There was no answer, RN left a message. Waiting for response.

## 2020-10-20 NOTE — CARE COORDINATION
Pt has been scheduled for a new patient appointment with Dr. Jody Baldwin on November 11 at 10:30. Pt verbalizes understanding that they must bring photo ID, insurance card, and medication list to appointment. Pt also understands that they are to arrive 15 minutes prior to appointment. Appointment information entered into discharge navigator.     Electronically signed by Adriana Corea RN on 10/20/20 at 10:10 AM EDT

## 2020-10-20 NOTE — PROGRESS NOTES
RN spoke with Margarette Corral from the [facility] to verify patient's dose of methadone. Patient's reported dose is 125mg of oral liquid.

## 2020-10-20 NOTE — ED PROVIDER NOTES
negative. PASTMEDICAL HISTORY     Past Medical History:   Diagnosis Date    History of heroin use 9/16/2020     SURGICAL HISTORY     History reviewed. No pertinent surgical history. CURRENT MEDICATIONS       Previous Medications    IBUPROFEN (ADVIL;MOTRIN) 600 MG TABLET    Take 1 tablet by mouth every 6 hours as needed for Pain    METHADONE (DOLOPHINE) 10 MG TABLET    Take 100 mg by mouth every 4 hours as needed for Pain. MIRTAZAPINE (REMERON) 15 MG TABLET    Take 15 mg by mouth nightly    PRAZOSIN (MINIPRESS) 1 MG CAPSULE    Take 1 capsule by mouth daily    QUETIAPINE (SEROQUEL) 50 MG TABLET    Take 1 tablet by mouth daily    SERTRALINE (ZOLOFT) 50 MG TABLET    Take 1 tablet by mouth daily     ALLERGIES     has No Known Allergies. FAMILY HISTORY     has no family status information on file. SOCIAL HISTORY       Social History     Tobacco Use    Smoking status: Current Every Day Smoker     Packs/day: 0.50     Types: Cigarettes    Smokeless tobacco: Never Used   Substance Use Topics    Alcohol use: No    Drug use: No     Comment: on Methadone     PHYSICAL EXAM     INITIAL VITALS: /85   Pulse 88   Temp 98.4 °F (36.9 °C) (Oral)   Resp 17   Ht 5' 1\" (1.549 m)   Wt 270 lb (122.5 kg)   LMP 09/30/2020   SpO2 100%   BMI 51.02 kg/m²    Physical Exam  Vitals signs and nursing note reviewed. Constitutional:       General: She is not in acute distress. Appearance: She is well-developed. Comments: Morbidly obese   HENT:      Head: Normocephalic and atraumatic. Eyes:      Conjunctiva/sclera: Conjunctivae normal.   Neck:      Musculoskeletal: Neck supple. Cardiovascular:      Rate and Rhythm: Normal rate and regular rhythm. Pulses: Normal pulses. Heart sounds: No murmur. No friction rub. Pulmonary:      Effort: Pulmonary effort is normal. No respiratory distress. Breath sounds: Normal breath sounds. No stridor. No wheezing or rhonchi.    Abdominal:      General: There is no distension. Palpations: Abdomen is soft. Tenderness: There is no abdominal tenderness. There is no guarding or rebound. Musculoskeletal:      Comments: No bilateral lower extremity swelling, obese legs DP pulses bilaterally   Skin:     General: Skin is warm and dry. Capillary Refill: Capillary refill takes less than 2 seconds. Neurological:      Mental Status: She is alert. DIAGNOSTIC RESULTS   RADIOLOGY:All plain film, CT, MRI, and formal ultrasound images (except ED bedside ultrasound) are read by the radiologist, see reports below, unless otherwisenoted in MDM or here. XR CHEST (2 VW)   Final Result   No active cardiopulmonary disease         VL Lower Extremity Bilateral Venous Duplex    (Results Pending)     LABS: All lab results were reviewed by myself, and all abnormals are listed below. Labs Reviewed   CBC WITH AUTO DIFFERENTIAL - Abnormal; Notable for the following components:       Result Value    RBC 3.96 (*)     Hemoglobin 7.2 (*)     Hematocrit 23.7 (*)     MCV 59.8 (*)     MCH 18.2 (*)     MCHC 30.4 (*)     RDW 20.5 (*)     All other components within normal limits   BASIC METABOLIC PANEL - Abnormal; Notable for the following components:    Glucose 116 (*)     Calcium 8.4 (*)     Potassium 3.6 (*)     All other components within normal limits   HEPATIC FUNCTION PANEL - Abnormal; Notable for the following components: Total Bilirubin <0.15 (*)     All other components within normal limits   CK - Abnormal; Notable for the following components: Total  (*)     All other components within normal limits   BRAIN NATRIURETIC PEPTIDE   TROPONIN   MAGNESIUM   LIPASE   TROPONIN   PERIPHERAL BLOOD SMEAR, PATH REVIEW       EMERGENCY DEPARTMENTCOURSE:     Differential diagnosis includes nephrotic syndrome, DVT, heart failure exacerbation, obesity. I spoke to the patient she has a history of heroin use she is still on methadone she is not using anymore.   She states that she is not taking anything for pain she takes 120 mg of methadone I did educate her that methadone is a pretty strong pain medication. She does have significant risk factors with her obesity so will perform a an ACS work-up as well as rule out DVT but since this has been going on for so long most likely chronic in nature. Patient has a prolonged QTC most likely secondary to her large amount of methadone use.    9:26 PM EDT  Patient has an elevated CK of 223. I wanted give her IV fluids, however her hemoglobin is 7.2. This went down from 9.3. I went back and spoke to the patient a little bit more she denies having any heavy. She denies any black or bloody stools. I did see that she does have a diagnosis of iron deficiency anemia in the past but she does not recollect to this. In terms of her other work-up a duplex of the bilateral lower extremities was negative for DVT troponin BNP is not elevated there is no elevation in creatinine her potassium is mildly low at 3.6 but with her elevation in CK I do not want to supplement her potassium. She has no leukocytosis. I spoke to the patient about discharged with outpatient follow-up versus admission the patient states that she would rather be admitted I did speak to [address] [place] [address] practitioner who will admit the patient. EKG: All EKG's are interpreted by the Emergency Department Physician who either signs or Co-signs this chart in the absence of a cardiologist.  Normal sinus rhythm with a heart rate of 83 bpm prolonged QTC of 481 ms normal axis no acute ST or T wave changes    Vitals:    Vitals:    10/19/20 1852   BP: 115/85   Pulse: 88   Resp: 17   Temp: 98.4 °F (36.9 °C)   TempSrc: Oral   SpO2: 100%   Weight: 270 lb (122.5 kg)   Height: 5' 1\" (1.549 m)         FINAL IMPRESSION      1. Anemia, unspecified type    2.  Non-traumatic rhabdomyolysis         DISPOSITION/PLAN   DISPOSITION Decision To Admit 10/19/2020 09:26:01 PM    Bob Oglesby MD  Attending

## 2020-10-20 NOTE — PLAN OF CARE
Problem: Falls - Risk of:  Goal: Will remain free from falls  Description: Will remain free from falls  Outcome: Ongoing  Note: Patient remained free from falls. Call light within reach. Problem: Infection:  Goal: Will remain free from infection  Description: Will remain free from infection  Outcome: Ongoing  Note: No new signs or symptoms of infection noted this shift. Problem: Safety:  Goal: Free from accidental physical injury  Description: Free from accidental physical injury  Outcome: Ongoing  Note: Patient free from accidental physical injury this shift. Problem: Daily Care:  Goal: Daily care needs are met  Description: Daily care needs are met  Outcome: Ongoing  Note: ADLs met throughout shift.

## 2020-10-20 NOTE — CONSULTS
_                         Today's Date: 10/20/2020  Patient Name: Jensen Ferraro  Date of admission: 10/19/2020  7:40 PM  Patient's age: 37 y.o., 1985  Admission Dx: Symptomatic anemia [D64.9]      Requesting Physician: Zehra Fontanez MD    CHIEF COMPLAINT: Excessive weakness and fatigue. Consult for iron deficiency anemia. History Obtained From:  patient, electronic medical record    HISTORY OF PRESENT ILLNESS:      The patient is a 37 y.o.  female who is admitted to the hospital for further management of severe anemia. The patient has morbid obesity. She has history of heroin use. Patient has increasing weakness and fatigue for the last few months. Getting worse recently. She has shortness of breath. She has increasing lower extremities edema. No fever. No cough or congestion. Patient denies any GI bleeding. No melena or hematochezia. No hematemesis. She has heavy menstrual periods with passage of clots. Patient smokes only 3 to 4 cigarettes a day. Denies alcohol drinking. History of heroine abuse. Past Medical History:   has a past medical history of History of heroin use. Past Surgical History:   has no past surgical history on file. Family History: family history is not on file. Social History:   reports that she has been smoking cigarettes. She has been smoking about 0.50 packs per day. She has never used smokeless tobacco. She reports that she does not drink alcohol or use drugs. Medications:    Prior to Admission medications    Medication Sig Start Date End Date Taking?  Authorizing Provider   sertraline (ZOLOFT) 50 MG tablet Take 1 tablet by mouth daily 9/2/20  Yes Historical Provider, MD   QUETIAPINE FUMARATE PO Take 75 mg by mouth nightly  9/2/20  Yes Historical Provider, MD   prazosin (MINIPRESS) 1 MG capsule Take 1 capsule by mouth nightly  9/2/20  Yes Historical Provider, MD   methadone (DOLOPHINE) 10 MG tablet Take 125 mg by mouth daily.     Yes Historical Provider, MD   ibuprofen (ADVIL;MOTRIN) 600 MG tablet Take 1 tablet by mouth every 6 hours as needed for Pain  Patient taking differently: Take 400 mg by mouth every 6 hours as needed for Pain  8/29/18  Yes Beti Zhou PA-C     Current Facility-Administered Medications   Medication Dose Route Frequency Provider Last Rate Last Dose    perflutren lipid microspheres (DEFINITY) injection 2.2 mg  2 mL Intravenous ONCE PRN Amna Brito MD        iron sucrose (VENOFER) 200 mg in sodium chloride 0.9 % 100 mL IVPB  200 mg Intravenous Q24H Ciara Manriquez MD   Stopped at 10/20/20 1219    prazosin (MINIPRESS) capsule 1 mg  1 mg Oral Nightly Ciara Manriquez MD        sertraline (ZOLOFT) tablet 50 mg  50 mg Oral Daily Ciara Manriquez MD   50 mg at 10/20/20 1117    QUEtiapine (SEROQUEL) tablet 75 mg  75 mg Oral Nightly Ciara Manriquez MD        famotidine (PEPCID) tablet 20 mg  20 mg Oral BID Ciara Manriquez MD   20 mg at 10/20/20 1117    methadone 10 MG/5ML solution 125 mg  125 mg Oral Daily Ciara Manriquez MD   125 mg at 10/20/20 1314    ondansetron (ZOFRAN) injection 4 mg  4 mg Intravenous Q6H PRN Ciara Manriquez MD        iohexol (OMNIPAQUE 240) injection 50 mL  50 mL Oral ONCE PRN Ciara Manriquez MD        sodium chloride flush 0.9 % injection 10 mL  10 mL Intravenous 2 times per day Alverna Joan, APRN - CNP   10 mL at 10/20/20 1118    sodium chloride flush 0.9 % injection 10 mL  10 mL Intravenous PRN Alverna Joan, APRN - CNP        potassium chloride (KLOR-CON M) extended release tablet 40 mEq  40 mEq Oral PRN Alverna Joan, APRN - CNP        Or    potassium bicarb-citric acid (EFFER-K) effervescent tablet 40 mEq  40 mEq Oral PRN Alverna Joan, APRN - CNP        Or    potassium chloride 10 mEq/100 mL IVPB (Peripheral Line)  10 mEq Intravenous PRN Alverna Joan, APRN - CNP        magnesium sulfate 1 g in dextrose 5% 100 mL IVPB  1 g Intravenous PRN Alverna Joan, APRN - CNP  acetaminophen (TYLENOL) tablet 650 mg  650 mg Oral Q6H PRN Remya Susaner, APRN - CNP        Or    acetaminophen (TYLENOL) suppository 650 mg  650 mg Rectal Q6H PRN Remya Cocker, APRN - CNP        polyethylene glycol (GLYCOLAX) packet 17 g  17 g Oral Daily PRN Remya Susaner, APRN - CNP        nicotine (NICODERM CQ) 21 MG/24HR 1 patch  1 patch Transdermal Daily PRN Remya Susaner, APRN - CNP        enoxaparin (LOVENOX) injection 30 mg  30 mg Subcutaneous BID Spencer Almanza, APRN - CNP   30 mg at 10/20/20 8549       Allergies:  Patient has no known allergies. REVIEW OF SYSTEMS:      · General: Positive for weakness and fatigue. . No unanticipated weight loss or decreased appetite. No fever or chills. · Eyes: No blurred vision, eye pain or double vision. · Ears: No hearing problems or drainage. No tinnitus. · Throat: No sore throat, problems with swallowing or dysphagia. · Respiratory: No cough, sputum or hemoptysis. Positive for shortness of breath. No pleuritic chest pain. · Cardiovascular: No chest pain, orthopnea or PND. No lower extremity edema. No palpitation. · Gastrointestinal: No problems with swallowing. No abdominal pain or bloating. No nausea or vomiting. No diarrhea or constipation. No GI bleeding. · Genitourinary: No dysuria, hematuria, frequency or urgency. · Musculoskeletal: No muscle aches or pains. No limitation of movement. No back pain. No gait disturbance, No joint complaints. · Dermatologic: No skin rashes or pruritus. No skin lesions or discolorations. · Psychiatric: No depression, anxiety, or stress or signs of schizophrenia. No change in mood or affect. · Hematologic: No history of bleeding tendency. No bruises or ecchymosis. No history of clotting problems. · Infectious disease: No fever, chills or frequent infections. · Endocrine: No polydipsia or polyuria. No temperature intolerance. · Neurologic: No headaches or dizziness. No weakness or numbness of the extremities.  No 10/19/20  2020   AST 15   ALT 15   LABALBU 3.7               IMPRESSION:    Primary Problem  Symptomatic anemia    Active Hospital Problems    Diagnosis Date Noted    Symptomatic anemia [D64.9] 10/19/2020    SOB (shortness of breath) [R06.02] 10/19/2020    Leg swelling [M79.89] 10/19/2020    Morbid obesity with BMI of 40.0-44.9, adult ([facility] [address] [E66.01, Z68.41] 09/15/2020    Opioid use, unspecified with other opioid-induced disorder [facility]) [V35.784] 09/15/2020       RECOMMENDATIONS:  1. Records and labs and images were reviewed and discussed with the patient. 2. I reviewed the labs available to me and discussed with the patient. I explained to the patient the nature of this hematologic problem. I explained the significance of these abnormalities in layman language. 3. Patient is having worsening iron deficiency anemia. She does not have any evidence of GI blood loss. No problems with absorption. I believe this is related to menorrhagia with heavy menstrual periods. Patient has clots during her periods. 4. Current hemoglobin is 7.8. I do not see a need for blood transfusion. Patient will need full iron replacement. 5. She will need continued monitoring as outpatient. She may need further iron infusion in the future. Alternatively she may need to have GYN evaluation and intervention care of the heavy menstrual periods. 6. Patient's questions were answered to the best of her satisfaction and she verbalized full understanding and agreement. 7. Thank you for allowing us to participate in the care of this pleasant patient. Discussed with patient .     MD Aniya Cardoza [facility]                          Cell: [phone]    This note is created with the assistance of a speech recognition program.  While intending to generate a document that actually reflects the content of the visit, the document can still have some errors including those of syntax and sound a like substitutions which may escape proof reading. It such instances, actual meaning can be extrapolated by contextual diversion.

## 2020-10-20 NOTE — H&P
[facility] [facility]     HISTORY AND PHYSICAL EXAMINATION            Date:   10/20/2020  Patientname:  Rogelio King  Date of admission:  10/19/2020  7:40 PM  MRN:   [id number]  Account:  [de-identified]  YOB: 1985  PCP:    No primary care provider on file. Room:   [facility]  Code Status:    Full Code    CHIEF COMPLAINT     Chief Complaint   Patient presents with    Leg Swelling    Arm Pain     right    Heartburn       HISTORY OF PRESENT ILLNESS  (Character, Onset, Location, Duration,  Exacerbating/RelievingFactors, Radiation,   Associated Symptoms, Severity )      The patient is a 37 y.o.  female, evaluated for worsening bilateral leg swelling, right arm pain and heartburn. Patient states she has history of swelling in her legs. States that she has fallen several times. Denies hitting her head. She also states she sleeps in a chair. Does not keep her legs elevated. Complains of pain in her right shoulder. Denies injury. Also complains of intermittent episodes of heartburn for the past week. No chest pain, nausea or vomiting, abdominal pain, diarrhea. No history of CHF. She has a history of morbid obesity, anemia, methadone and heroin use. She does smoke cigarettes. PAST MEDICAL HISTORY   Patient  has a past medical history of History of heroin use. PAST SURGICAL HISTORY    Patient  has no past surgical history on file. FAMILY HISTORY    Patient family history is not on file. SOCIAL HISTORY    Patient  reports that she has been smoking cigarettes. She has been smoking about 0.50 packs per day. She has never used smokeless tobacco. She reports that she does not drink alcohol or use drugs. HOME MEDICATIONS        Prior to Admission medications    Medication Sig Start Date End Date Taking?  Authorizing Provider   sertraline (ZOLOFT) 50 MG tablet Take 1 tablet by mouth daily 9/2/20  Yes Historical Provider, MD   QUETIAPINE FUMARATE PO Take 75 mg by in the last 72 hours. Glycosylated hemoglobin A1C: No results found for: LABA1C  Hepatic:   Recent Labs     10/19/20  2020   AST 15   ALT 15   ALKPHOS 103     CARDIAC ENZY:   Recent Labs     10/19/20  2020 10/19/20  2215   CKTOTAL 223*  --    TROPHS <6 <6     INR: No results for input(s): INR in the last 72 hours. BNP:   Recent Labs     10/19/20  2020   PROBNP 32      ABGs: No results for input(s): PH, PCO2, PO2, HCO3, O2SAT in the last 72 hours. Lipids: No results for input(s): CHOL, TRIG, HDL, LDLCALC in the last 72 hours. Invalid input(s): LDL  Pancreatic functions:  Recent Labs     10/19/20  2020   LIPASE 15     S. LacticAcid: No results for input(s): LACTA in the last 72 hours. Thyroid functions:   Lab Results   Component Value Date    TSH 2.83 11/14/2019      U/A:No results for input(s): NITRITE, COLORU, WBCUA, RBCUA, MUCUS, BACTERIA, CLARITYU, SPECGRAV, LEUKOCYTESUR, BLOODU, GLUCOSEU, AMORPHOUS in the last 72 hours. Invalid input(s): Kristy Echeverria    Imaging/Diagonstics:     Xr Chest (2 Vw)    Result Date: 10/19/2020  EXAMINATION: TWO XRAY VIEWS OF THE CHEST 10/19/2020 9:06 pm COMPARISON: 11/14/2019 HISTORY: ORDERING SYSTEM PROVIDED HISTORY: shortness of breath TECHNOLOGIST PROVIDED HISTORY: shortness of breath Reason for Exam: Bilateral leg swelling Acuity: Acute Type of Exam: Initial FINDINGS: Heart size and pulmonary vasculature within normal limits. Lungs clear. Costophrenic angles sharp     No active cardiopulmonary disease     ASSESSMENT  and  PLAN     Principal Problem:    Symptomatic anemia  Active Problems:    Opioid use, unspecified with other opioid-induced disorder ([facility] [address]    Morbid obesity with BMI of 40.0-44.9, adult (HCC)    SOB (shortness of breath)    Leg swelling  Resolved Problems:    * No resolved hospital problems.  *    Plan:  Symptomatic anemia  -Hgb 7.2 in ED  -recheck Hgb in am  -Patient denies abdominal pain, diarrhea, or blood in stool  -Will check blood occult  -check iron studies in am    Leg swelling  -Lasix 20 mg IV in am  -Dopplers done in ED, awaiting report  -2decho in am  -Chest xray negative      Consultations:     None      MIKE Nunez - CNP   10/20/2020  12:27 AM    Chanel Peters [address] [place] [address], [address] [place] [address].    Phone [phone]

## 2020-10-20 NOTE — H&P
[facility]     HISTORY AND PHYSICAL EXAMINATION            Date:   10/20/2020  Patientname:  Jose Green  Date of admission:  10/19/2020  7:40 PM  MRN:   [id number]  Account:  [de-identified]  YOB: 1985  PCP:    No primary care provider on file. Room:   [facility]  Code Status:    Full Code    CHIEF COMPLAINT     Chief Complaint   Patient presents with    Leg Swelling    Arm Pain     right    Heartburn       HISTORY OF PRESENT ILLNESS  (Character, Onset, Location, Duration,  Exacerbating/RelievingFactors, Radiation,   Associated Symptoms, Severity )      The patient is a 37 y.o.  female, evaluated for worsening bilateral leg swelling, right arm pain and heartburn. Patient states she has history of swelling in her legs. States that she has fallen several times. Denies hitting her head. She also states she sleeps in a chair. Does not keep her legs elevated. Complains of pain in her right shoulder. Denies injury. Also complains of intermittent episodes of heartburn for the past week. No chest pain, nausea or vomiting, abdominal pain, diarrhea. No history of CHF. She has a history of morbid obesity, anemia, methadone and heroin use. She does smoke cigarettes. PAST MEDICAL HISTORY   Patient  has a past medical history of History of heroin use. PAST SURGICAL HISTORY    Patient  has no past surgical history on file. FAMILY HISTORY    Patient family history is not on file. SOCIAL HISTORY    Patient  reports that she has been smoking cigarettes. She has been smoking about 0.50 packs per day. She has never used smokeless tobacco. She reports that she does not drink alcohol or use drugs. HOME MEDICATIONS        Prior to Admission medications    Medication Sig Start Date End Date Taking?  Authorizing Provider   sertraline (ZOLOFT) 50 MG tablet Take 1 tablet by mouth daily 9/2/20  Yes Historical Provider, MD   QUETIAPINE FUMARATE PO Take 75 mg by are 2+ on the right side and 2+ on the left side. Heart sounds: Normal heart sounds. Pulmonary:      Effort: Pulmonary effort is normal.      Breath sounds: Normal breath sounds. Abdominal:      General: Bowel sounds are normal.      Palpations: Abdomen is soft. Tenderness: There is no abdominal tenderness. Musculoskeletal: Normal range of motion. Right lower leg: She exhibits tenderness. 3+ Pitting Edema present. Left lower leg: She exhibits tenderness. 3+ Pitting Edema present. Comments: 3+ edema to bilateral lower extremities. Patient exhibits generalized tenderness to palpation. No erythema or wound. Pedal pulses palpable. Skin:     General: Skin is warm and dry. Capillary Refill: Capillary refill takes more than 3 seconds. Findings: No erythema. Neurological:      Mental Status: She is alert and oriented to person, place, and time. GCS: GCS eye subscore is 4. GCS verbal subscore is 5. GCS motor subscore is 6. Cranial Nerves: Cranial nerves are intact. Sensory: Sensation is intact. Motor: Motor function is intact. Psychiatric:         Mood and Affect: Mood normal.         Behavior: Behavior normal.         Thought Content: Thought content normal.         Judgment: Judgment normal.       DIAGNOSTICS      EKG: (as documented in ED note): Normal sinus rhythm with a heart rate of 83 bpm prolonged QTC of 481 ms normal axis no acute ST or T wave changes    Labs:  CBC:   Recent Labs     10/19/20  2020 10/20/20  0448   WBC 7.9 6.8   HGB 7.2* 7.2*    349     BMP:    Recent Labs     10/19/20  2020 10/20/20  0448    138   K 3.6* 3.9    105   CO2 25 25   BUN 9 9   CREATININE 0.59 0.54   GLUCOSE 116* 101*     S. Calcium:  Recent Labs     10/20/20  0448   CALCIUM 8.3*     S. Ionized Calcium:No results for input(s): IONCA in the last 72 hours. S. Magnesium:  Recent Labs     10/19/20  2020   MG 1.9     S.  Phosphorus:No results for input(s): PHOS in the last 72 hours. S. Glucose:No results for input(s): POCGLU in the last 72 hours. Glycosylated hemoglobin A1C: No results found for: LABA1C  Hepatic:   Recent Labs     10/19/20  2020   AST 15   ALT 15   ALKPHOS 103     CARDIAC ENZY:   Recent Labs     10/19/20  2020 10/19/20  2215   CKTOTAL 223*  --    TROPHS <6 <6     INR:   Recent Labs     10/20/20  0448   INR 1.0     BNP:   Recent Labs     10/19/20  2020   PROBNP 32      ABGs: No results for input(s): PH, PCO2, PO2, HCO3, O2SAT in the last 72 hours. Lipids: No results for input(s): CHOL, TRIG, HDL, LDLCALC in the last 72 hours. Invalid input(s): LDL  Pancreatic functions:  Recent Labs     10/19/20  2020   LIPASE 15     S. LacticAcid: No results for input(s): LACTA in the last 72 hours. Thyroid functions:   Lab Results   Component Value Date    TSH 2.83 11/14/2019      U/A:No results for input(s): NITRITE, COLORU, WBCUA, RBCUA, MUCUS, BACTERIA, CLARITYU, SPECGRAV, LEUKOCYTESUR, BLOODU, GLUCOSEU, AMORPHOUS in the last 72 hours. Invalid input(s): Sheryle Myrtle    Imaging/Diagonstics:     Xr Chest (2 Vw)    Result Date: 10/19/2020  EXAMINATION: TWO XRAY VIEWS OF THE CHEST 10/19/2020 9:06 pm COMPARISON: 11/14/2019 HISTORY: ORDERING SYSTEM PROVIDED HISTORY: shortness of breath TECHNOLOGIST PROVIDED HISTORY: shortness of breath Reason for Exam: Bilateral leg swelling Acuity: Acute Type of Exam: Initial FINDINGS: Heart size and pulmonary vasculature within normal limits. Lungs clear. Costophrenic angles sharp     No active cardiopulmonary disease     ASSESSMENT  and  PLAN     Principal Problem:    Symptomatic anemia  Active Problems:    Opioid use, unspecified with other opioid-induced disorder ([facility] [address]    Morbid obesity with BMI of 40.0-44.9, adult (HCC)    SOB (shortness of breath)    Leg swelling  Resolved Problems:    * No resolved hospital problems.  *    Plan:  Symptomatic anemia  -Hgb 7.2 in ED  -recheck Hgb in am  -Patient denies abdominal pain, diarrhea, or blood in stool  -Will check blood occult  -check iron studies in am    Leg swelling  -Lasix 20 mg IV in am  -Dopplers done in ED, awaiting report  -2decho in am  -Chest xray negative      Consultations:     None      Kam Calzada MD   10/20/2020  10:36 AM    [address]  [address]  [place], [address] [place] [address]. Phone [phone]     Attending Physician Statement  I have discussed the care of Rogelio King with the CNP. I have examined the patient myself and taken ros and hpi , including pertinent history and exam findings. I have reviewed the key elements of all parts of the encounter with the CNPt. I agree with the assessment, plan and orders as documented by the CNP. 29-year-old presenting with intermittent heartburn, not related to food, not related to exertion, worse at night, associated with right arm pain, bilateral leg swelling. Patient reports leg swelling has been present for several months but got worse over the last 2 weeks, also associated with pain which keeps her awake at night. Troponins flat  Associated with significant weight gain over the last few months  Admitted with leg swelling-Dopplers negative for DVT  Severe anemia-hemoglobin stable, no active bleed. Denies heavy menses, check FOBT  Significant hemoglobin drop from 9.3 in November 2019 to 7.2 at presentation.   Concern for heart failure however proBNP normal.  No cardiomegaly on chest x-ray, check echo  Microcytic hypochromic anemia, severe iron deficiency -?cause  Prolonged QTC  Elevated CK-monitor  Morbid obesity, history of substance abuse-on methadone, will confirm dose from [facility] center   Alcohol-reports [id number] drinks a month denies any intravenous drug use or any recent tattoos    Electronically signed by Kam Calzada MD

## 2020-10-21 VITALS
RESPIRATION RATE: 16 BRPM | OXYGEN SATURATION: 97 % | HEIGHT: 61 IN | HEART RATE: 87 BPM | BODY MASS INDEX: 55.32 KG/M2 | WEIGHT: 293 LBS | DIASTOLIC BLOOD PRESSURE: 66 MMHG | TEMPERATURE: 98.2 F | SYSTOLIC BLOOD PRESSURE: 110 MMHG

## 2020-10-21 LAB
ANION GAP SERPL CALCULATED.3IONS-SCNC: 8 MMOL/L (ref 9–17)
BUN BLDV-MCNC: 9 MG/DL (ref 6–20)
BUN/CREAT BLD: ABNORMAL (ref 9–20)
CALCIUM SERPL-MCNC: 8.3 MG/DL (ref 8.6–10.4)
CHLORIDE BLD-SCNC: 101 MMOL/L (ref 98–107)
CO2: 25 MMOL/L (ref 20–31)
CREAT SERPL-MCNC: 0.75 MG/DL (ref 0.5–0.9)
GFR AFRICAN AMERICAN: >60 ML/MIN
GFR NON-AFRICAN AMERICAN: >60 ML/MIN
GFR SERPL CREATININE-BSD FRML MDRD: ABNORMAL ML/MIN/{1.73_M2}
GFR SERPL CREATININE-BSD FRML MDRD: ABNORMAL ML/MIN/{1.73_M2}
GLUCOSE BLD-MCNC: 102 MG/DL (ref 70–99)
HCT VFR BLD CALC: 24.5 % (ref 36–46)
HEMOGLOBIN: 7.2 G/DL (ref 12–16)
MCH RBC QN AUTO: 17.8 PG (ref 26–34)
MCHC RBC AUTO-ENTMCNC: 29.5 G/DL (ref 31–37)
MCV RBC AUTO: 60.2 FL (ref 80–100)
NRBC AUTOMATED: ABNORMAL PER 100 WBC
PATHOLOGIST REVIEW: NORMAL
PDW BLD-RTO: 21.2 % (ref 11.5–14.9)
PLATELET # BLD: 343 K/UL (ref 150–450)
PMV BLD AUTO: 8.5 FL (ref 6–12)
POTASSIUM SERPL-SCNC: 3.9 MMOL/L (ref 3.7–5.3)
RBC # BLD: 4.07 M/UL (ref 4–5.2)
SODIUM BLD-SCNC: 134 MMOL/L (ref 135–144)
TOTAL CK: 152 U/L (ref 26–192)
WBC # BLD: 7.2 K/UL (ref 3.5–11)

## 2020-10-21 PROCEDURE — 6370000000 HC RX 637 (ALT 250 FOR IP): Performed by: INTERNAL MEDICINE

## 2020-10-21 PROCEDURE — 96366 THER/PROPH/DIAG IV INF ADDON: CPT

## 2020-10-21 PROCEDURE — G0378 HOSPITAL OBSERVATION PER HR: HCPCS

## 2020-10-21 PROCEDURE — 97162 PT EVAL MOD COMPLEX 30 MIN: CPT

## 2020-10-21 PROCEDURE — 97535 SELF CARE MNGMENT TRAINING: CPT

## 2020-10-21 PROCEDURE — 80048 BASIC METABOLIC PNL TOTAL CA: CPT

## 2020-10-21 PROCEDURE — 82550 ASSAY OF CK (CPK): CPT

## 2020-10-21 PROCEDURE — 97166 OT EVAL MOD COMPLEX 45 MIN: CPT

## 2020-10-21 PROCEDURE — 85027 COMPLETE CBC AUTOMATED: CPT

## 2020-10-21 PROCEDURE — 2580000003 HC RX 258: Performed by: NURSE PRACTITIONER

## 2020-10-21 PROCEDURE — 6360000002 HC RX W HCPCS: Performed by: INTERNAL MEDICINE

## 2020-10-21 PROCEDURE — 99232 SBSQ HOSP IP/OBS MODERATE 35: CPT | Performed by: INTERNAL MEDICINE

## 2020-10-21 PROCEDURE — 99239 HOSP IP/OBS DSCHRG MGMT >30: CPT | Performed by: INTERNAL MEDICINE

## 2020-10-21 PROCEDURE — 6360000002 HC RX W HCPCS: Performed by: NURSE PRACTITIONER

## 2020-10-21 PROCEDURE — 96372 THER/PROPH/DIAG INJ SC/IM: CPT

## 2020-10-21 PROCEDURE — 2580000003 HC RX 258: Performed by: INTERNAL MEDICINE

## 2020-10-21 PROCEDURE — 97116 GAIT TRAINING THERAPY: CPT

## 2020-10-21 PROCEDURE — 36415 COLL VENOUS BLD VENIPUNCTURE: CPT

## 2020-10-21 RX ORDER — FUROSEMIDE 20 MG/1
20 TABLET ORAL DAILY
Qty: 30 TABLET | Refills: 0 | Status: SHIPPED | OUTPATIENT
Start: 2020-10-21 | End: 2020-11-11 | Stop reason: ALTCHOICE

## 2020-10-21 RX ORDER — FAMOTIDINE 20 MG/1
20 TABLET, FILM COATED ORAL 2 TIMES DAILY
Qty: 60 TABLET | Refills: 3 | Status: SHIPPED | OUTPATIENT
Start: 2020-10-21 | End: 2021-02-10

## 2020-10-21 RX ORDER — LANOLIN ALCOHOL/MO/W.PET/CERES
325 CREAM (GRAM) TOPICAL
Qty: 90 TABLET | Refills: 0 | Status: SHIPPED | OUTPATIENT
Start: 2020-10-21 | End: 2020-11-17

## 2020-10-21 RX ADMIN — Medication 10 ML: at 09:49

## 2020-10-21 RX ADMIN — SODIUM CHLORIDE 200 MG: 9 INJECTION, SOLUTION INTRAVENOUS at 13:08

## 2020-10-21 RX ADMIN — METHADONE HYDROCHLORIDE 125 MG: 10 SOLUTION ORAL at 09:26

## 2020-10-21 RX ADMIN — FAMOTIDINE 20 MG: 20 TABLET ORAL at 09:26

## 2020-10-21 RX ADMIN — SERTRALINE HYDROCHLORIDE 50 MG: 50 TABLET ORAL at 09:26

## 2020-10-21 RX ADMIN — ENOXAPARIN SODIUM 30 MG: 30 INJECTION SUBCUTANEOUS at 09:26

## 2020-10-21 ASSESSMENT — PAIN SCALES - GENERAL: PAINLEVEL_OUTOF10: 0

## 2020-10-21 NOTE — PROGRESS NOTES
Physical Therapy    Facility/Department: [address] [place] [address] CARE  Initial Assessment    NAME: Balta Wilcox  : 1985  MRN: [id number]    Date of Service: 10/21/2020    Discharge Recommendations:  Patient would benefit from continued therapy after discharge        Assessment   Body structures, Functions, Activity limitations: Decreased functional mobility ; Decreased ROM; Decreased strength;Decreased endurance; Increased pain  Assessment: Pt demonstrtes safe transfers from [facility], has observed safe ambution in the hallway , pt reports feeling weak, decreased endurance, increased fatiqu with activity. Pt will benfit from outpatient physical therapy at his time. Treatment Diagnosis: Weakness  Prognosis: Fair  Decision Making: Low Complexity  Exam: ROM, MMT, fucntion  REQUIRES PT FOLLOW UP: Yes  Activity Tolerance  Activity Tolerance: Patient limited by fatigue;Patient limited by endurance; Patient limited by pain       Patient Diagnosis(es): The primary encounter diagnosis was Anemia, unspecified type. A diagnosis of Non-traumatic rhabdomyolysis was also pertinent to this visit. has a past medical history of History of heroin use.   has no past surgical history on file. Restrictions  Restrictions/Precautions  Restrictions/Precautions: Fall Risk  Required Braces or Orthoses?: No  Vision/Hearing  Vision: Impaired  Vision Exceptions: Wears glasses at all times  Hearing: Within functional limits     Subjective  General  Patient assessed for rehabilitation services?: Yes  Additional Pertinent Hx: Pt admitted for the management of Symptomatic anemia , presented to ER with Leg Swelling; Arm Pain (right); and Heartburn.  72-year-old presenting with intermittent heartburn, not related to food, not related to exertion, worse at night, associated with right arm pain, bilateral leg swelling.  Patient reports leg swelling has been present for several months (8-9mths) but got worse over the last 2 weeks, also associated with Status: (intermittent numbness/tingling BLE distal to knees)     Transfers  Sit to Stand: Stand by assistance  Stand to sit: Stand by assistance  Ambulation  Ambulation?: Yes  Ambulation 1  Surface: level tile  Device: No Device(IV pole)  Assistance: Stand by assistance  Quality of Gait: pt giselle stood for 5 to 8 secs prior to initiating gait, reports she is just getting her bearings, few few steps antalgic due to leg pain/swelling, pt holds onto the IV pole and than able to let go the IV pole, and walks SBA, pt does not  her feet at times. but slides, no LOB. Earlier this therapist has observed pt up in the hallway, pt at that dewey had no antalgia or shuffling steps. Distance: 50 ft     Balance  Posture: Good  Sitting - Static: Good  Sitting - Dynamic: Fair(diffuclty bending over)  Standing - Static: Good;-  Standing - Dynamic: Fair;+  Other exercises  Other exercises?: Yes  Other exercises 1: Educated in importance of lelvation/compression of B LE, educted on imporatnac eoof activity, discussed to consider outpatient physical therapy for strengthening and progress to fitness programm. Plan   Plan  Times per week: 1 to 2 visit  Current Treatment Recommendations: Strengthening, Balance Training, Functional Mobility Training, Endurance Training, Gait Training, Stair training, Safety Education & Training, Home Exercise Program    G-Code       OutComes Score                                                  AM-PAC Score             Goals  Short term goals  Time Frame for Short term goals: 1 to 2 treatment  Short term goal 1: Pt to demonstrate good technique for HEP to conitnue at home  Short term goal 2: Pt able to demonstare safe technique going up and downsteps with st cane or perform step ups (6\") with st acne at SBA  Short term goal 3: Pt to demonstate safe gait on levle surfaces , dist of 200 ft  Patient Goals   Patient goals : Wants to be independent.        Therapy Time   Individual Concurrent Group Co-treatment   Time In 1336         Time Out 1402         Minutes 26         Timed Code Treatment Minutes: [facility]       Ellyn Riojas PT

## 2020-10-21 NOTE — PROGRESS NOTES
[id number]    Occupational Therapy Evaluation  Date: 10/21/20  Patient Name: Bandar Presconstance       Room:   MRN: [id number]  Account: [de-identified]   : 1985  (37 y.o.) Gender: female     Discharge Recommendations: The patient's needs are being met with no further Occupational Therapy recommended at discharge. Equipment Needed: Yes    Referring Practitioner: MIKE Nunez CNP  Diagnosis: Symptomatic Anemia  Additional Pertinent Hx: The patient is a 37 y.o.  female, evaluated for worsening bilateral leg swelling, right arm pain and heartburn. Patient states she has history of swelling in her legs. States that she has fallen several times. Denies hitting her head. She also states she sleeps in a chair. Does not keep her legs elevated. Complains of pain in her right shoulder. Denies injury. Also complains of intermittent episodes of heartburn for the past week. No chest pain, nausea or vomiting, abdominal pain, diarrhea. No history of CHF. She has a history of morbid obesity, anemia, methadone and heroin use. Treatment Diagnosis: Impaired self-care status. Past Medical History:  has a past medical history of History of heroin use. Past Surgical History:   has no past surgical history on file. Restrictions  Restrictions/Precautions: Fall Risk  Required Braces or Orthoses?: No     Vitals  Temp: 98.2 °F (36.8 °C)  Pulse: 87  Resp: 16  BP: 110/66  Height: 5' 1\" (154.9 cm)  Weight: 297 lb 6.4 oz (134.9 kg)  BMI (Calculated): 56.3  Oxygen Therapy  SpO2: 97 %  Pulse Oximeter Device Mode: Intermittent  Pulse Oximeter Device Location: Finger  O2 Device: None (Room air)  Level of Consciousness: Alert    Subjective  Subjective: \"I can't stand for very long or else I will fall\" Pt reports needing shower chair. OT provided education regarding shower chair and grab bar for tub/shower.      Vision  Vision: Impaired  Vision Exceptions: Wears glasses at all times  Hearing  Hearing: Within functional limits  Social/Functional History  Lives With: Spouse  Type of Home: House  Home Layout: One level  Home Access: Stairs to enter without rails  Entrance Stairs - Number of Steps: 3  Bathroom Shower/Tub: Tub/Shower unit  Bathroom Toilet: Standard  Bathroom Equipment: (no DME)  Home Equipment: Wannetta Fennel Help From: Family  ADL Assistance: Independent(assist PRN for socks)  Homemaking Assistance: Independent  Homemaking Responsibilities: Yes  Ambulation Assistance: Independent(with cane)  Transfer Assistance: Independent  Active : No  Patient's  Info: Pt's spouse  Occupation: Unemployed  Additional Comments: Pt's spouse works full-time dayshift. Objective  Vision - Basic Assessment  Prior Vision: Wears glasses all the time   Cognition  Overall Cognitive Status: WFL   Perception  Overall Perceptual Status: WFL  Sensation  Overall Sensation Status: (intermittent numbness/tingling BLE distal to knees)   ADL  Feeding: Independent  Grooming: Setup  UE Bathing: Setup  LE Bathing: Stand by assistance  UE Dressing: Setup  LE Dressing: Minimal assistance  Toileting: Stand by assistance  Additional Comments: ADL scores based on skilled observations and clinical reasoning unless otherwise noted.     UE Function  LUE Strength  Gross LUE Strength: WFL  L Hand General: 4/5  LUE Strength Comment: Grossly 4/5     LUE Tone: Normotonic     LUE AROM (degrees)  LUE AROM : WFL     Left Hand AROM (degrees)  Left Hand AROM: WFL  RUE Strength  Gross RUE Strength: WFL  R Hand General: 4/5  RUE Strength Comment: Grossly 4/5      RUE Tone: Normotonic     RUE AROM (degrees)  RUE AROM : WFL     Right Hand AROM (degrees)  Right Hand AROM: WFL    Fine Motor Skills  Coordination  Movements Are Fluid And Coordinated: Yes     Mobility  Balance  Sitting Balance: Modified independent   Standing Balance: Stand by assistance     Functional Mobility  Functional - Mobility Device: No device  Activity: (to/from doorway and back)  Assist Level: Stand by assistance  Bed mobility  Comment: Pt seated in recliner at start and end of session. Pt reports inability to sleep in bed due to back pain. Transfers  Sit to stand: Stand by assistance  Stand to sit: Stand by assistance  Functional Activity Tolerance  Functional Activity Tolerance: Tolerates 30 min exercise with multiple rests   Assessment  Performance deficits / Impairments: Decreased functional mobility , Decreased ADL status, Decreased strength, Decreased endurance, Decreased balance, Decreased high-level IADLs  Treatment Diagnosis: Impaired self-care status. Prognosis: Good  Decision Making: Medium Complexity  REQUIRES OT FOLLOW UP: Yes  Discharge Recommendations: Patient would benefit from continued therapy after discharge  Activity Tolerance: Patient Tolerated treatment well    Goals  Patient Goals   Patient goals : To return home  Short term goals  Time Frame for Short term goals: By discharge  Short term goal 1: Pt will verbalize/demonstrate Good understanding of assistive equipment/durable medical equipment/modified techniques for increased independence with self-care and mobility. Short term goal 2: Pt will perform BADLs with modified techniques for increased independence with self-care and mobility. Short term goal 3: Pt will perform functional mobility and transfers during self-care with modified independence and Good safety.     Plan  Safety Devices  Safety Devices in place: Yes  Type of devices: Call light within reach, Left in chair, Nurse notified     Plan  Times per week: 3-5  Times per day: Daily  Current Treatment Recommendations: Self-Care / ADL, Home Management Training, Strengthening, Balance Training, Functional Mobility Training, Endurance Training, Pain Management, Safety Education & Training, Patient/Caregiver Education & Training, Equipment Evaluation, Education, & procurement       Equipment Recommendations  Equipment Needed: Yes  Grab bars: X  Shower Chair: X  OT Individual Minutes  Time In: 8212  Time Out: 3344  Minutes: 26    Electronically signed by Albaro Villanueva OT on 10/21/20 at 3:27 PM EDT

## 2020-10-21 NOTE — PROGRESS NOTES
Patient discharged, all paperwork gone over including diet instructions, follow up appointments and new medications(print outs included) gone over, IV removed and all belongings with patient. Patient brought to private vehicle and discharged home.

## 2020-10-21 NOTE — CARE COORDINATION
ONGOING DISCHARGE PLAN:    Spoke with patient regarding discharge plan and patient confirms that plan is still to DC to home w/ , w/ no needs at this time. Pt. Denies VNS. Pt. Informed, that Dr. Andrew Dee, doesn't feel she needs a shower chair, for she is getting up just fine. HGB today, 7.2, Remains on Iv Iron. Hemoc following. Will continue to follow for additional discharge needs.     Electronically signed by Jax Little RN on 10/21/2020 at 9:53 AM

## 2020-10-21 NOTE — PROGRESS NOTES
_                         Today's Date: 10/21/2020  Patient Name: Gisell Eubanks  Date of admission: 10/19/2020  7:40 PM  Patient's age: 37 y.o., 1985  Admission Dx: Symptomatic anemia [D64.9]      Requesting Physician: Hyacinth Zamarripa MD    CHIEF COMPLAINT: Excessive weakness and fatigue. Consult for iron deficiency anemia. SUBJECTIVE:  . Patient was seen and examined. No events overnight. No active bleeding. She received IV iron infusion. Hemoglobin today is 7.2. No new events. No fever. BRIEF CASE HISTORY:    The patient is a 37 y.o.  female who is admitted to the hospital for further management of severe anemia. The patient has morbid obesity. She has history of heroin use. Patient has increasing weakness and fatigue for the last few months. Getting worse recently. She has shortness of breath. She has increasing lower extremities edema. No fever. No cough or congestion. Patient denies any GI bleeding. No melena or hematochezia. No hematemesis. She has heavy menstrual periods with passage of clots. Patient smokes only 3 to 4 cigarettes a day. Denies alcohol drinking. History of heroine abuse. Past Medical History:   has a past medical history of History of heroin use. Past Surgical History:   has no past surgical history on file. Family History: family history is not on file. Social History:   reports that she has been smoking cigarettes. She has been smoking about 0.50 packs per day. She has never used smokeless tobacco. She reports that she does not drink alcohol or use drugs. Medications:    Prior to Admission medications    Medication Sig Start Date End Date Taking?  Authorizing Provider   famotidine (PEPCID) 20 MG tablet Take 1 tablet by mouth 2 times daily 10/21/20  Yes Chadwick Addison MD   ferrous sulfate (FE TABS 325) 325 (65 Fe) MG EC tablet Take 1 tablet by mouth 3 times daily (with meals) 10/21/20 Yes Yadira Pelletier MD   furosemide (LASIX) 20 MG tablet Take 1 tablet by mouth daily 10/21/20  Yes Yadira Pelletier MD   sertraline (ZOLOFT) 50 MG tablet Take 1 tablet by mouth daily 9/2/20  Yes Historical Provider, MD   QUETIAPINE FUMARATE PO Take 75 mg by mouth nightly  9/2/20  Yes Historical Provider, MD   prazosin (MINIPRESS) 1 MG capsule Take 1 capsule by mouth nightly  9/2/20  Yes Historical Provider, MD   methadone (DOLOPHINE) 10 MG tablet Take 125 mg by mouth daily. Yes Historical Provider, MD   ibuprofen (ADVIL;MOTRIN) 600 MG tablet Take 1 tablet by mouth every 6 hours as needed for Pain  Patient taking differently: Take 400 mg by mouth every 6 hours as needed for Pain  8/29/18  Yes Isai Chaudhari PA-C     No current facility-administered medications for this encounter. Current Outpatient Medications   Medication Sig Dispense Refill    famotidine (PEPCID) 20 MG tablet Take 1 tablet by mouth 2 times daily 60 tablet 3    ferrous sulfate (FE TABS 325) 325 (65 Fe) MG EC tablet Take 1 tablet by mouth 3 times daily (with meals) 90 tablet 0    furosemide (LASIX) 20 MG tablet Take 1 tablet by mouth daily 30 tablet 0    sertraline (ZOLOFT) 50 MG tablet Take 1 tablet by mouth daily      QUETIAPINE FUMARATE PO Take 75 mg by mouth nightly       prazosin (MINIPRESS) 1 MG capsule Take 1 capsule by mouth nightly       methadone (DOLOPHINE) 10 MG tablet Take 125 mg by mouth daily.  ibuprofen (ADVIL;MOTRIN) 600 MG tablet Take 1 tablet by mouth every 6 hours as needed for Pain (Patient taking differently: Take 400 mg by mouth every 6 hours as needed for Pain ) 30 tablet 0       Allergies:  Patient has no known allergies. REVIEW OF SYSTEMS:      · General: Positive for weakness and fatigue. . No unanticipated weight loss or decreased appetite. No fever or chills. · Eyes: No blurred vision, eye pain or double vision. · Ears: No hearing problems or drainage. No tinnitus.    · Throat: No sore throat, problems with swallowing or dysphagia. · Respiratory: No cough, sputum or hemoptysis. Positive for shortness of breath. No pleuritic chest pain. · Cardiovascular: No chest pain, orthopnea or PND. No lower extremity edema. No palpitation. · Gastrointestinal: No problems with swallowing. No abdominal pain or bloating. No nausea or vomiting. No diarrhea or constipation. No GI bleeding. · Genitourinary: No dysuria, hematuria, frequency or urgency. · Musculoskeletal: No muscle aches or pains. No limitation of movement. No back pain. No gait disturbance, No joint complaints. · Dermatologic: No skin rashes or pruritus. No skin lesions or discolorations. · Psychiatric: No depression, anxiety, or stress or signs of schizophrenia. No change in mood or affect. · Hematologic: No history of bleeding tendency. No bruises or ecchymosis. No history of clotting problems. · Infectious disease: No fever, chills or frequent infections. · Endocrine: No polydipsia or polyuria. No temperature intolerance. · Neurologic: No headaches or dizziness. No weakness or numbness of the extremities. No changes in balance, coordination,  memory, mentation, behavior. · Allergic/Immunologic: No nasal congestion or hives. No repeated infections. PHYSICAL EXAM:      /66   Pulse 87   Temp 98.2 °F (36.8 °C) (Oral)   Resp 16   Ht 5' 1\" (1.549 m)   Wt 297 lb 6.4 oz (134.9 kg)   LMP 2020   SpO2 97%   BMI 56.19 kg/m²    Temp (24hrs), Av.4 °F (36.9 °C), Min:98.1 °F (36.7 °C), Max:99.1 °F (37.3 °C)      General appearance - not in pain or distress. Patient looks sleepy.   Mental status - alert and oriented  Eyes - pupils equal and reactive, extraocular eye movements intact  Ears - bilateral TM's and external ear canals normal  Nose - normal and patent, no erythema, discharge or polyps  Mouth - mucous membranes moist, pharynx normal without lesions  Neck - supple, no significant adenopathy  Lymphatics - no palpable lymphadenopathy, no hepatosplenomegaly  Chest - clear to auscultation, no wheezes, rales or rhonchi, symmetric air entry  Heart - normal rate, regular rhythm, normal S1, S2, no murmurs, rubs, clicks or gallops  Abdomen - soft, nontender, nondistended, no masses or organomegaly  Neurological - alert, oriented, normal speech, no focal findings or movement disorder noted  Musculoskeletal - no joint tenderness, deformity or swelling  Extremities - peripheral pulses normal, +2 pedal edema, no clubbing or cyanosis  Skin - normal coloration and turgor, no rashes, no suspicious skin lesions noted           DATA:      Labs:       CBC:   Recent Labs     10/20/20  0448 10/20/20  1747 10/21/20  0530   WBC 6.8  --  7.2   HGB 7.2* 7.1* 7.2*   HCT 24.3* 24.5* 24.5*     --  343     BMP:   Recent Labs     10/20/20  0448 10/21/20  0530    134*   K 3.9 3.9   CO2 25 25   BUN 9 9   CREATININE 0.54 0.75   LABGLOM >60 >60   GLUCOSE 101* 102*     PT/INR:   Recent Labs     10/20/20  0448   PROTIME 13.3   INR 1.0     APTT:  Recent Labs     10/20/20  0448   APTT 32.7     LIVER PROFILE:  Recent Labs     10/19/20  2020   AST 15   ALT 15   LABALBU 3.7               IMPRESSION:    Primary Problem  Symptomatic anemia    Active Hospital Problems    Diagnosis Date Noted    Non-traumatic rhabdomyolysis [M62.82]     Symptomatic anemia [D64.9] 10/19/2020    SOB (shortness of breath) [R06.02] 10/19/2020    Leg swelling [M79.89] 10/19/2020    Morbid obesity with BMI of 40.0-44.9, adult ([facility] [address] [E66.01, Z68.41] 09/15/2020    Opioid use, unspecified with other opioid-induced disorder [facility]) [F11.988] 09/15/2020       RECOMMENDATIONS:  1. I reviewed the labs available to me and discussed with the patient. I explained to the patient the nature of this hematologic problem. I explained the significance of these abnormalities in layman language. 2. Patient is having worsening iron deficiency anemia.   She does not have any evidence of GI blood loss. No problems with absorption. I believe this is related to menorrhagia with heavy menstrual periods. Patient has clots during her periods. 3. Current hemoglobin is 7.2. I do not see a need for blood transfusion. Patient will need full iron replacement. 4. She will need continued monitoring as outpatient. She may need further iron infusion in the future. Alternatively she may need to have GYN evaluation and intervention care of the heavy menstrual periods. 5. Probable discharge today. She will be seen as outpatient. Discussed with patient . Pavel Monahan MD                            [address] [place] [address] [facility]                          Cell: [phone]    This note is created with the assistance of a speech recognition program.  While intending to generate a document that actually reflects the content of the visit, the document can still have some errors including those of syntax and sound a like substitutions which may escape proof reading. It such instances, actual meaning can be extrapolated by contextual diversion.

## 2020-10-21 NOTE — DISCHARGE SUMMARY
[facility] Internal Medicine    Discharge Summary     Patient ID: Yeimi Newell  :  1985   MRN: [id number]     ACCOUNT:  [de-identified]   Patient's PCP: No primary care provider on file. Admit Date: 10/19/2020   Discharge Date: 10/21/2020    Length of Stay: 0  Code Status:  Full Code  Admitting Physician: Katherin Jones MD  Discharge Physician: General Marla MD     Active Discharge Diagnoses:     Primary Problem  Symptomatic anemia      Hospital Problems  Active Hospital Problems    Diagnosis Date Noted    Non-traumatic rhabdomyolysis [M62.82]     Symptomatic anemia [D64.9] 10/19/2020    SOB (shortness of breath) [R06.02] 10/19/2020    Leg swelling [M79.89] 10/19/2020    Morbid obesity with BMI of 40.0-44.9, adult ([facility] [address] [E66.01, Z68.41] 09/15/2020    Opioid use, unspecified with other opioid-induced disorder [facility]) [F11.988] 09/15/2020       Admission Condition:  fair     Discharged Condition: fair    Hospital Stay:     Hospital Course:  Yeimi Newell is a 37 y.o. female who was admitted for the management of Symptomatic anemia , presented to ER with Leg Swelling; Arm Pain (right); and Heartburn      37year-old presenting with intermittent heartburn, not related to food, not related to exertion, worse at night, associated with right arm pain, bilateral leg swelling. Patient reports leg swelling has been present for several months (8-9mths) but got worse over the last 2 weeks, also associated with pain which keeps her awake at night. Troponins flat  Associated with significant weight gain over the last few months  Right arm pain resolved within few hours    Admitted with leg swelling-Dopplers negative for DVT  Severe anemia-hemoglobin stable, no active bleed. Denies heavy menses,  reports clots-no hematology-started on iron supplements-2 doses of IV iron administered, discharged on p.o. iron.   Recommend outpatient gynecology follow-up  Significant hemoglobin drop from 9.3 in November 2019 to 7.2 at presentation. Concern for heart failure however proBNP normal.  No cardiomegaly on chest x-ray, echo unremarkable  Microcytic hypochromic anemia, severe iron deficiency  Prolonged QTC  Elevated CK-resolved  Morbid obesity, history of substance abuse-on methadone,  confirmed dose from [facility] center   Alcohol-reports pzlbopdcol-1-8 drinks a month denies any intravenous drug use or any recent tattoos    Significant therapeutic interventions: As above    Significant Diagnostic Studies:   Labs / Micro:    Lab Results   Component Value Date    WBC 7.2 10/21/2020    HGB 7.2 (L) 10/21/2020    HCT 24.5 (L) 10/21/2020    MCV 60.2 (L) 10/21/2020     10/21/2020          Lab Results   Component Value Date     10/21/2020    K 3.9 10/21/2020     10/21/2020    CO2 25 10/21/2020    BUN 9 10/21/2020    CREATININE 0.75 10/21/2020    GLUCOSE 102 10/21/2020    CALCIUM 8.3 10/21/2020          Radiology:    Xr Chest (2 Vw)    Result Date: 10/19/2020  EXAMINATION: TWO XRAY VIEWS OF THE CHEST 10/19/2020 9:06 pm COMPARISON: 11/14/2019 HISTORY: ORDERING SYSTEM PROVIDED HISTORY: shortness of breath TECHNOLOGIST PROVIDED HISTORY: shortness of breath Reason for Exam: Bilateral leg swelling Acuity: Acute Type of Exam: Initial FINDINGS: Heart size and pulmonary vasculature within normal limits. Lungs clear. Costophrenic angles sharp     No active cardiopulmonary disease     Ct Abdomen Pelvis W Iv Contrast Additional Contrast? Oral    Result Date: 10/21/2020  EXAMINATION: CT OF THE ABDOMEN AND PELVIS WITH CONTRAST 10/20/2020 11:00 pm TECHNIQUE: CT of the abdomen and pelvis was performed with the administration of intravenous contrast. Multiplanar reformatted images are provided for review. Dose modulation, iterative reconstruction, and/or weight based adjustment of the mA/kV was utilized to reduce the radiation dose to as low as reasonably achievable. COMPARISON: None.  HISTORY: ORDERING SYSTEM PROVIDED HISTORY: anemia TECHNOLOGIST PROVIDED HISTORY: anemia Reason for Exam: anemia; pt had no abdominal complaints Acuity: Acute Type of Exam: Unknown FINDINGS: The lung bases are clear. No evidence of pericardial or pleural effusion. There is a large body habitus. There is fatty infiltration of the gluteal muscles. There is some increased attenuation stranding adjacent to the lateral lower abdominal muscles and posterior to the spinal muscles suggesting some edema without evidence of anasarca. There is a small fatty hiatal hernia. Osseous structures: Unremarkable in appearance. Aorta and inferior vena cava are unremarkable in appearance. Liver and gallbladder: Normal in appearance. Spleen: Normal in appearance. Pancreas: Normal in appearance. Adrenals: Normal in appearance. Right kidney and left kidney: Normal in appearance. Bowel: Normal in appearance. Pelvic structures: Normal in appearance. Unremarkable CT of the abdomen and pelvis. No focal abnormality     Unremarkable CT of the abdomen and pelvis. No significant focal abnormality is identified. Large body habitus. Vl Lower Extremity Bilateral Venous Duplex    Result Date: 10/20/2020    [facility]  Vascular Lower Extremities DVT Study Procedure   Patient Name Jannette Perez      Date of Study           10/19/2020               SHAWN BATISTA   Date of      1985  Gender                  Female  Birth   Age          37 year(s)  Race                    Other   Room Number  2091   Corporate ID [id number]  #   Patient Sandra [de-identified]  #   MR #         [id number]      Fran Yan   Accession #  [id number]  Interpreting Physician  Solange Huang   Referring                Referring Physician     Lenora Holman MD  Nurse  Practitioner  Procedure Type of Study:   Veins: Lower Extremities DVT Study, Venous Scan Lower Bilateral.  Indications for Study:Leg Swelling. Patient Status:Out Patient.  Technical Quality:Limited visualization. Limitation reason:Edema. Conclusions   Summary   Technically limited visualization. No evidence of superficial or deep venous thrombosis in both lower  extremities. Signature   ----------------------------------------------------------------  Electronically signed by True Monroe(Sonographer) on  10/19/2020 09:24 PM  ----------------------------------------------------------------   ----------------------------------------------------------------  Electronically signed by Seun Cedillo(Interpreting physician)  on 10/20/2020 08:53 AM  ----------------------------------------------------------------  Findings:   Right Impression:                    Left Impression:  Non visualization of the posterior   Non visualization of the posterior  tibial and peroneal veins. tibial and peroneal veins. Remaining deep veins demonstrate     Remaining deep veins demonstrate  normal compressibility and           normal compressibility and  augmentation. augmentation. Normal compressibility of the great  Normal compressibility of the great  saphenous vein. saphenous vein. Normal compressibility of the small  Normal compressibility of the small  saphenous vein. saphenous vein. Velocities are measured in cm/s ; Diameters are measured in cm Right Lower Extremities DVT Study Measurements Right 2D Measurements +------------------------------------+----------+---------------+----------+ ! Location                            ! Visualized! Compressibility! Thrombosis! +------------------------------------+----------+---------------+----------+ ! Common Femoral                      !Yes       ! Yes            ! None      ! +------------------------------------+----------+---------------+----------+ ! Prox Femoral                        !Yes       ! Yes            ! None      ! +------------------------------------+----------+---------------+----------+ !Mid Femoral                         !Yes       ! Yes            ! None      ! +------------------------------------+----------+---------------+----------+ ! Dist Femoral                        !Yes       ! Yes            ! None      ! +------------------------------------+----------+---------------+----------+ ! Popliteal                           !Yes       ! Yes            ! None      ! +------------------------------------+----------+---------------+----------+ ! Sapheno Femoral Junction            ! Yes       ! Yes            ! None      ! +------------------------------------+----------+---------------+----------+ ! PTV                                 ! No        !               !          ! +------------------------------------+----------+---------------+----------+ ! Peroneal                            !No        !               !          ! +------------------------------------+----------+---------------+----------+ ! Gastroc                             ! Yes       ! Yes            ! None      ! +------------------------------------+----------+---------------+----------+ ! GSV Thigh                           ! Yes       ! Yes            ! None      ! +------------------------------------+----------+---------------+----------+ ! GSV Knee                            ! Yes       ! Yes            ! None      ! +------------------------------------+----------+---------------+----------+ ! GSV Ankle                           ! Yes       ! Yes            ! None      ! +------------------------------------+----------+---------------+----------+ ! SSV                                 ! Yes       ! Yes            ! None      ! +------------------------------------+----------+---------------+----------+ Right Doppler Measurements +---------------------------+------+------+--------------------------------+ ! Location                   ! Signal!Reflux! Reflux (msec)                   ! follow-up appointments      Requiring Further Evaluation/Follow Up POST HOSPITALIZATION/Incidental Findings:    Physician Follow Up:     Yadira Pelletier MD  [address] [facility] [id number] [phone]      New Primary Care Physician. November 11, at 10:30. If unable to keep this scheduled apt. call office within 24 hours to cancel. Wear Mask, Bring Photo ID, Insurance information. Med List.  Arrive 15 minutes early.     Mason Weber, [address] [place] [address]  [address] [address]  [phone]    In 2 weeks      [address]  [facility] [address] [facility]  [phone]    In 2 weeks         Diet: Regular    Activity: As tolerated    Discharge Medications:      Medication List      START taking these medications    famotidine 20 MG tablet  Commonly known as:  PEPCID  Take 1 tablet by mouth 2 times daily     ferrous sulfate 325 (65 Fe) MG EC tablet  Commonly known as:  FE TABS 325  Take 1 tablet by mouth 3 times daily (with meals)     furosemide 20 MG tablet  Commonly known as:  Lasix  Take 1 tablet by mouth daily        CHANGE how you take these medications    ibuprofen 600 MG tablet  Commonly known as:  ADVIL;MOTRIN  Take 1 tablet by mouth every 6 hours as needed for Pain  What changed:  how much to take        CONTINUE taking these medications    methadone 10 MG tablet  Commonly known as:  DOLOPHINE     prazosin 1 MG capsule  Commonly known as:  MINIPRESS     QUETIAPINE FUMARATE PO     sertraline 50 MG tablet  Commonly known as:  ZOLOFT           Where to Get Your Medications      These medications were sent to [address], [address]    Phone:  [phone]   · famotidine 20 MG tablet  · ferrous sulfate 325 (65 Fe) MG EC tablet  · furosemide 20 MG tablet         Time Spent on discharge is  35 mins in patient examination, evaluation, counseling as well as medication reconciliation, prescriptions for required medications, discharge plan and follow up. Electronically signed by   Isai Means MD  10/21/2020  2:24 PM      Thank you Dr. Poon Loss primary care provider on file. for the opportunity to be involved in this patient's care.

## 2020-10-22 ENCOUNTER — TELEPHONE (OUTPATIENT)
Dept: ONCOLOGY | Age: 35
End: 2020-10-22

## 2020-10-22 NOTE — TELEPHONE ENCOUNTER
Pt needs to be scheduled with Dr. Magali Serrano for a hosp f/u in 2 weeks (around 11/2) LM for pt to call back and schedule appt.

## 2020-10-30 ENCOUNTER — TELEPHONE (OUTPATIENT)
Dept: ONCOLOGY | Age: 35
End: 2020-10-30

## 2020-10-30 ENCOUNTER — OFFICE VISIT (OUTPATIENT)
Dept: ONCOLOGY | Age: 35
End: 2020-10-30
Payer: MEDICAID

## 2020-10-30 ENCOUNTER — HOSPITAL ENCOUNTER (OUTPATIENT)
Age: 35
Discharge: HOME | End: 2020-10-30
Payer: MEDICAID

## 2020-10-30 VITALS
HEART RATE: 90 BPM | HEIGHT: 61 IN | BODY MASS INDEX: 55.32 KG/M2 | DIASTOLIC BLOOD PRESSURE: 82 MMHG | WEIGHT: 293 LBS | TEMPERATURE: 98.4 F | SYSTOLIC BLOOD PRESSURE: 134 MMHG

## 2020-10-30 DIAGNOSIS — D64.9 ANEMIA, UNSPECIFIED TYPE: ICD-10-CM

## 2020-10-30 PROBLEM — D50.8 IRON DEFICIENCY ANEMIA SECONDARY TO INADEQUATE DIETARY IRON INTAKE: Status: ACTIVE | Noted: 2020-10-30

## 2020-10-30 PROBLEM — K90.9 IRON MALABSORPTION: Status: ACTIVE | Noted: 2020-10-30

## 2020-10-30 LAB
ABSOLUTE EOS #: 0.14 K/UL (ref 0–0.4)
ABSOLUTE IMMATURE GRANULOCYTE: ABNORMAL K/UL (ref 0–0.3)
ABSOLUTE LYMPH #: 1.7 K/UL (ref 1–4.8)
ABSOLUTE MONO #: 0.5 K/UL (ref 0.1–1.2)
BASOPHILS # BLD: 0 % (ref 0–2)
BASOPHILS ABSOLUTE: 0 K/UL (ref 0–0.2)
DIFFERENTIAL TYPE: ABNORMAL
EOSINOPHILS RELATIVE PERCENT: 2 % (ref 1–4)
HCT VFR BLD CALC: 25.9 % (ref 36–46)
HEMOGLOBIN: 7.7 G/DL (ref 12–16)
IMMATURE GRANULOCYTES: ABNORMAL %
LYMPHOCYTES # BLD: 24 % (ref 24–44)
MCH RBC QN AUTO: 18.5 PG (ref 26–34)
MCHC RBC AUTO-ENTMCNC: 29.6 G/DL (ref 31–37)
MCV RBC AUTO: 62.6 FL (ref 80–100)
MONOCYTES # BLD: 7 % (ref 2–11)
MORPHOLOGY: ABNORMAL
NRBC AUTOMATED: ABNORMAL PER 100 WBC
PDW BLD-RTO: 22.9 % (ref 12.5–15.4)
PLATELET # BLD: 358 K/UL (ref 140–450)
PLATELET ESTIMATE: ABNORMAL
PMV BLD AUTO: 8.6 FL (ref 6–12)
RBC # BLD: 4.13 M/UL (ref 4–5.2)
RBC # BLD: ABNORMAL 10*6/UL
SEG NEUTROPHILS: 67 % (ref 36–66)
SEGMENTED NEUTROPHILS ABSOLUTE COUNT: 4.76 K/UL (ref 1.8–7.7)
WBC # BLD: 7.1 K/UL (ref 3.5–11)
WBC # BLD: ABNORMAL 10*3/UL

## 2020-10-30 PROCEDURE — 85025 COMPLETE CBC W/AUTO DIFF WBC: CPT

## 2020-10-30 PROCEDURE — G8484 FLU IMMUNIZE NO ADMIN: HCPCS | Performed by: INTERNAL MEDICINE

## 2020-10-30 PROCEDURE — G8427 DOCREV CUR MEDS BY ELIG CLIN: HCPCS | Performed by: INTERNAL MEDICINE

## 2020-10-30 PROCEDURE — 36415 COLL VENOUS BLD VENIPUNCTURE: CPT

## 2020-10-30 PROCEDURE — 99211 OFF/OP EST MAY X REQ PHY/QHP: CPT | Performed by: INTERNAL MEDICINE

## 2020-10-30 PROCEDURE — 4004F PT TOBACCO SCREEN RCVD TLK: CPT | Performed by: INTERNAL MEDICINE

## 2020-10-30 PROCEDURE — G8417 CALC BMI ABV UP PARAM F/U: HCPCS | Performed by: INTERNAL MEDICINE

## 2020-10-30 PROCEDURE — 99214 OFFICE O/P EST MOD 30 MIN: CPT | Performed by: INTERNAL MEDICINE

## 2020-10-30 RX ORDER — EPINEPHRINE 1 MG/ML
0.3 INJECTION, SOLUTION, CONCENTRATE INTRAVENOUS PRN
Status: CANCELLED | OUTPATIENT
Start: 2020-10-30

## 2020-10-30 RX ORDER — SODIUM CHLORIDE 0.9 % (FLUSH) 0.9 %
10 SYRINGE (ML) INJECTION PRN
Status: CANCELLED | OUTPATIENT
Start: 2020-10-30

## 2020-10-30 RX ORDER — DIPHENHYDRAMINE HYDROCHLORIDE 50 MG/ML
50 INJECTION INTRAMUSCULAR; INTRAVENOUS ONCE
Status: CANCELLED | OUTPATIENT
Start: 2020-10-30

## 2020-10-30 RX ORDER — METHYLPREDNISOLONE SODIUM SUCCINATE 125 MG/2ML
125 INJECTION, POWDER, LYOPHILIZED, FOR SOLUTION INTRAMUSCULAR; INTRAVENOUS ONCE
Status: CANCELLED | OUTPATIENT
Start: 2020-10-30

## 2020-10-30 RX ORDER — SODIUM CHLORIDE 9 MG/ML
INJECTION, SOLUTION INTRAVENOUS CONTINUOUS
Status: CANCELLED | OUTPATIENT
Start: 2020-10-30

## 2020-10-30 RX ORDER — HEPARIN SODIUM (PORCINE) LOCK FLUSH IV SOLN 100 UNIT/ML 100 UNIT/ML
500 SOLUTION INTRAVENOUS PRN
Status: CANCELLED | OUTPATIENT
Start: 2020-10-30

## 2020-10-30 RX ORDER — SODIUM CHLORIDE 0.9 % (FLUSH) 0.9 %
5 SYRINGE (ML) INJECTION PRN
Status: CANCELLED | OUTPATIENT
Start: 2020-10-30

## 2020-10-30 NOTE — PROGRESS NOTES
_                       Chief Complaint   Patient presents with    Follow-Up from Hospital     Patient was in [address] [place] [address] for Anemia    Edema     DIAGNOSIS:       Iron deficiency anemia  Malabsorption  Menorrhagia    CURRENT THERAPY:         IV iron infusion      INTERIM HISTORY:   The patient seen for follow-up iron deficiency anemia. The patient was recently seen in the hospital in the middle October due to severe anemia. She is quite symptomatic. She received 1 dose of Venofer and patient was discharged. She is currently on oral iron. She had no response. She has upset stomach. She has excessive weakness and fatigue. She has shortness of breath on minimal exertion. Currently no active bleeding. Usually She has heavy menstrual periods. Brief case history:  The patient is a 37 y.o.  female who is admitted to the hospital for further management of severe anemia. The patient has morbid obesity. She has history of heroin use. Patient has increasing weakness and fatigue for the last few months. Getting worse recently. She has shortness of breath. She has increasing lower extremities edema. No fever. No cough or congestion. Patient denies any GI bleeding. No melena or hematochezia. No hematemesis. She has heavy menstrual periods with passage of clots. Patient smokes only 3 to 4 cigarettes a day. Denies alcohol drinking. History of heroine abuse. Past Medical History:   has a past medical history of History of heroin use. Past Surgical History:   has no past surgical history on file. Family History: family history is not on file. Social History:   reports that she has been smoking cigarettes. She has been smoking about 0.50 packs per day. She has never used smokeless tobacco. She reports that she does not drink alcohol or use drugs.    Medications:    Prior to Admission medications    Medication Sig Start Date End Date Positive for weakness and fatigue. . No unanticipated weight loss or decreased appetite. No fever or chills. · Eyes: No blurred vision, eye pain or double vision. · Ears: No hearing problems or drainage. No tinnitus. · Throat: No sore throat, problems with swallowing or dysphagia. · Respiratory: No cough, sputum or hemoptysis. Positive for shortness of breath. No pleuritic chest pain. · Cardiovascular: No chest pain, orthopnea or PND. No lower extremity edema. No palpitation. · Gastrointestinal: No problems with swallowing. No abdominal pain or bloating. No nausea or vomiting. No diarrhea or constipation. No GI bleeding. · Genitourinary: No dysuria, hematuria, frequency or urgency. · Musculoskeletal: No muscle aches or pains. No limitation of movement. No back pain. No gait disturbance, No joint complaints. · Dermatologic: No skin rashes or pruritus. No skin lesions or discolorations. · Psychiatric: No depression, anxiety, or stress or signs of schizophrenia. No change in mood or affect. · Hematologic: No history of bleeding tendency. No bruises or ecchymosis. No history of clotting problems. · Infectious disease: No fever, chills or frequent infections. · Endocrine: No polydipsia or polyuria. No temperature intolerance. · Neurologic: No headaches or dizziness. No weakness or numbness of the extremities. No changes in balance, coordination,  memory, mentation, behavior. · Allergic/Immunologic: No nasal congestion or hives. No repeated infections. PHYSICAL EXAM:      /82   Pulse 90   Temp 98.4 °F (36.9 °C) (Oral)   Ht 5' 1\" (1.549 m)   Wt 299 lb 14.4 oz (136 kg)   LMP 2020   BMI 56.67 kg/m²    Temp (24hrs), Av.4 °F (36.9 °C), Min:98.1 °F (36.7 °C), Max:99.1 °F (37.3 °C)      General appearance - not in pain or distress. Patient looks sleepy.   Mental status - alert and oriented  Eyes - pupils equal and reactive, extraocular eye movements intact  Ears - bilateral TM's and external ear canals normal  Nose - normal and patent, no erythema, discharge or polyps  Mouth - mucous membranes moist, pharynx normal without lesions  Neck - supple, no significant adenopathy  Lymphatics - no palpable lymphadenopathy, no hepatosplenomegaly  Chest - clear to auscultation, no wheezes, rales or rhonchi, symmetric air entry  Heart - normal rate, regular rhythm, normal S1, S2, no murmurs, rubs, clicks or gallops  Abdomen - soft, nontender, nondistended, no masses or organomegaly  Neurological - alert, oriented, normal speech, no focal findings or movement disorder noted  Musculoskeletal - no joint tenderness, deformity or swelling  Extremities - peripheral pulses normal, +2 pedal edema, no clubbing or cyanosis  Skin - normal coloration and turgor, no rashes, no suspicious skin lesions noted           DATA:      Labs:       CBC:   Recent Labs     10/30/20  1428   WBC 7.1   HGB 7.7*   HCT 25.9*        Lab Results   Component Value Date    WBC 7.1 10/30/2020    HGB 7.7 (L) 10/30/2020    HCT 25.9 (L) 10/30/2020    MCV 62.6 (L) 10/30/2020     10/30/2020       Chemistry        Component Value Date/Time     (L) 10/21/2020 0530    K 3.9 10/21/2020 0530     10/21/2020 0530    CO2 25 10/21/2020 0530    BUN 9 10/21/2020 0530    CREATININE 0.75 10/21/2020 0530        Component Value Date/Time    CALCIUM 8.3 (L) 10/21/2020 0530    ALKPHOS 103 10/19/2020 2020    AST 15 10/19/2020 2020    ALT 15 10/19/2020 2020    BILITOT <0.15 (L) 10/19/2020 2020        Lab Results   Component Value Date    IRON 26 (L) 10/20/2020    TIBC 331 10/20/2020    FERRITIN 6 (L) 10/20/2020             IMPRESSION:    Primary Problem  Symptomatic anemia  Iron deficiency anemia  Malabsorption  Intolerable side effects to oral iron  Menorrhagia    RECOMMENDATIONS:  1. I reviewed the labs available to me and discussed with the patient. I explained to the patient the nature of this hematologic problem.  I explained the significance of these abnormalities in layman language. 2. Patient is having worsening iron deficiency anemia. She does not have any evidence of GI blood loss. I believe this is related to menorrhagia with heavy menstrual periods. Patient has clots during her periods. She may be having element of absorption as well. She DrBekah Salazar Sadia oral iron. She continues to be quite symptomatic. Repeated hemoglobin today is 7.7.  3. I will give IV iron infusion using Injectafer. Benefits and side effects explained. Patient agreed. 4. Patient's questions were answered to the best of her satisfaction and she verbalized full understanding and agreement. MD Charanjit Ibar[facility]                          This note is created with the assistance of a speech recognition program.  While intending to generate a document that actually reflects the content of the visit, the document can still have some errors including those of syntax and sound a like substitutions which may escape proof reading. It such instances, actual meaning can be extrapolated by contextual diversion.

## 2020-11-06 ENCOUNTER — TELEPHONE (OUTPATIENT)
Dept: ONCOLOGY | Age: 35
End: 2020-11-06

## 2020-11-06 NOTE — TELEPHONE ENCOUNTER
Notification received from Kendrick Trejo, , stating she received a message from Wendy Valenzuela, pre-cert, stating Lockie Scarlet is not the preferred drug. Insurance is stating that Infed, Venofer or Ferrlecit would be a preferred drug. Advise if you want to change to one of above noted drugs?  Jacek Thapa

## 2020-11-09 ENCOUNTER — TELEPHONE (OUTPATIENT)
Dept: ONCOLOGY | Age: 35
End: 2020-11-09

## 2020-11-09 RX ORDER — SODIUM CHLORIDE 0.9 % (FLUSH) 0.9 %
5 SYRINGE (ML) INJECTION PRN
Status: CANCELLED | OUTPATIENT
Start: 2020-11-16

## 2020-11-09 RX ORDER — METHYLPREDNISOLONE SODIUM SUCCINATE 125 MG/2ML
125 INJECTION, POWDER, LYOPHILIZED, FOR SOLUTION INTRAMUSCULAR; INTRAVENOUS ONCE
Status: CANCELLED | OUTPATIENT
Start: 2020-11-16

## 2020-11-09 RX ORDER — SODIUM CHLORIDE 0.9 % (FLUSH) 0.9 %
10 SYRINGE (ML) INJECTION PRN
Status: CANCELLED | OUTPATIENT
Start: 2020-11-16

## 2020-11-09 RX ORDER — HEPARIN SODIUM (PORCINE) LOCK FLUSH IV SOLN 100 UNIT/ML 100 UNIT/ML
500 SOLUTION INTRAVENOUS PRN
Status: CANCELLED | OUTPATIENT
Start: 2020-11-16

## 2020-11-09 RX ORDER — EPINEPHRINE 1 MG/ML
0.3 INJECTION, SOLUTION, CONCENTRATE INTRAVENOUS PRN
Status: CANCELLED | OUTPATIENT
Start: 2020-11-16

## 2020-11-09 RX ORDER — SODIUM CHLORIDE 9 MG/ML
INJECTION, SOLUTION INTRAVENOUS CONTINUOUS
Status: CANCELLED | OUTPATIENT
Start: 2020-11-16

## 2020-11-09 RX ORDER — DIPHENHYDRAMINE HYDROCHLORIDE 50 MG/ML
50 INJECTION INTRAMUSCULAR; INTRAVENOUS ONCE
Status: CANCELLED | OUTPATIENT
Start: 2020-11-16

## 2020-11-09 NOTE — TELEPHONE ENCOUNTER
Voicemail received from patient stating she is not feeling well at all. Writer called patient back at [phone] to further assess; however, patient did not answer. Writer left voicemail instructing patient to go to a [facility] lab ASA for cbc, iron studies, TIBC and ferritin drawn. Patient to call office back after labs are drawn. Orders in chart.  Tara Wyatt

## 2020-11-11 ENCOUNTER — TELEPHONE (OUTPATIENT)
Dept: INTERNAL MEDICINE CLINIC | Age: 35
End: 2020-11-11

## 2020-11-11 ENCOUNTER — HOSPITAL ENCOUNTER (OUTPATIENT)
Age: 35
Setting detail: SPECIMEN
Discharge: HOME | End: 2020-11-11
Payer: MEDICAID

## 2020-11-11 ENCOUNTER — OFFICE VISIT (OUTPATIENT)
Dept: INTERNAL MEDICINE CLINIC | Age: 35
End: 2020-11-11
Payer: MEDICAID

## 2020-11-11 ENCOUNTER — TELEPHONE (OUTPATIENT)
Dept: ONCOLOGY | Age: 35
End: 2020-11-11

## 2020-11-11 VITALS
SYSTOLIC BLOOD PRESSURE: 114 MMHG | BODY MASS INDEX: 53.47 KG/M2 | DIASTOLIC BLOOD PRESSURE: 74 MMHG | HEART RATE: 94 BPM | OXYGEN SATURATION: 97 % | WEIGHT: 283 LBS | TEMPERATURE: 97.7 F

## 2020-11-11 LAB
ABSOLUTE EOS #: 0.23 K/UL (ref 0–0.4)
ABSOLUTE IMMATURE GRANULOCYTE: 0 K/UL (ref 0–0.3)
ABSOLUTE LYMPH #: 2.31 K/UL (ref 1–4.8)
ABSOLUTE MONO #: 0.39 K/UL (ref 0.1–0.8)
BASOPHILS # BLD: 1 % (ref 0–2)
BASOPHILS ABSOLUTE: 0.08 K/UL (ref 0–0.2)
CA 19-9: 8 U/ML (ref 0–35)
DIFFERENTIAL TYPE: ABNORMAL
EOSINOPHILS RELATIVE PERCENT: 3 % (ref 1–4)
FERRITIN: 21 UG/L (ref 13–150)
HCT VFR BLD CALC: 32.2 % (ref 36.3–47.1)
HEMOGLOBIN: 8.4 G/DL (ref 11.9–15.1)
IMMATURE GRANULOCYTES: 0 %
IRON SATURATION: 5 % (ref 20–55)
IRON: 18 UG/DL (ref 37–145)
LYMPHOCYTES # BLD: 30 % (ref 24–44)
MCH RBC QN AUTO: 19 PG (ref 25.2–33.5)
MCHC RBC AUTO-ENTMCNC: 26.1 G/DL (ref 28.4–34.8)
MCV RBC AUTO: 73 FL (ref 82.6–102.9)
MONOCYTES # BLD: 5 % (ref 1–7)
MORPHOLOGY: ABNORMAL
NRBC AUTOMATED: 0 PER 100 WBC
PDW BLD-RTO: 27 % (ref 11.8–14.4)
PLATELET # BLD: 415 K/UL (ref 138–453)
PLATELET ESTIMATE: ABNORMAL
PMV BLD AUTO: 10.4 FL (ref 8.1–13.5)
RBC # BLD: 4.41 M/UL (ref 3.95–5.11)
RBC # BLD: ABNORMAL 10*6/UL
SEG NEUTROPHILS: 61 % (ref 36–66)
SEGMENTED NEUTROPHILS ABSOLUTE COUNT: 4.69 K/UL (ref 1.8–7.7)
TOTAL IRON BINDING CAPACITY: 341 UG/DL (ref 250–450)
UNSATURATED IRON BINDING CAPACITY: 323 UG/DL (ref 112–347)
WBC # BLD: 7.7 K/UL (ref 3.5–11.3)
WBC # BLD: ABNORMAL 10*3/UL

## 2020-11-11 PROCEDURE — 90471 IMMUNIZATION ADMIN: CPT | Performed by: INTERNAL MEDICINE

## 2020-11-11 PROCEDURE — 99214 OFFICE O/P EST MOD 30 MIN: CPT | Performed by: INTERNAL MEDICINE

## 2020-11-11 PROCEDURE — G8482 FLU IMMUNIZE ORDER/ADMIN: HCPCS | Performed by: INTERNAL MEDICINE

## 2020-11-11 PROCEDURE — 4004F PT TOBACCO SCREEN RCVD TLK: CPT | Performed by: INTERNAL MEDICINE

## 2020-11-11 PROCEDURE — G8417 CALC BMI ABV UP PARAM F/U: HCPCS | Performed by: INTERNAL MEDICINE

## 2020-11-11 PROCEDURE — 90686 IIV4 VACC NO PRSV 0.5 ML IM: CPT | Performed by: INTERNAL MEDICINE

## 2020-11-11 PROCEDURE — 90732 PPSV23 VACC 2 YRS+ SUBQ/IM: CPT | Performed by: INTERNAL MEDICINE

## 2020-11-11 PROCEDURE — 90472 IMMUNIZATION ADMIN EACH ADD: CPT | Performed by: INTERNAL MEDICINE

## 2020-11-11 PROCEDURE — G8427 DOCREV CUR MEDS BY ELIG CLIN: HCPCS | Performed by: INTERNAL MEDICINE

## 2020-11-11 RX ORDER — FUROSEMIDE 40 MG/1
40 TABLET ORAL DAILY
Qty: 30 TABLET | Refills: 1 | Status: SHIPPED | OUTPATIENT
Start: 2020-11-11 | End: 2020-12-16

## 2020-11-11 ASSESSMENT — PATIENT HEALTH QUESTIONNAIRE - PHQ9
SUM OF ALL RESPONSES TO PHQ9 QUESTIONS 1 & 2: 0
2. FEELING DOWN, DEPRESSED OR HOPELESS: 0
SUM OF ALL RESPONSES TO PHQ QUESTIONS 1-9: 0
1. LITTLE INTEREST OR PLEASURE IN DOING THINGS: 0
SUM OF ALL RESPONSES TO PHQ QUESTIONS 1-9: 0
SUM OF ALL RESPONSES TO PHQ QUESTIONS 1-9: 0

## 2020-11-11 NOTE — TELEPHONE ENCOUNTER
Pt states she forgot to inform provider at today's visit that she is losing a lot of hair, an abnormal amount. Please advise.

## 2020-11-11 NOTE — TELEPHONE ENCOUNTER
LM on pts phone stating per Dr. Luigi Lees, loss of hair is d/t pts dx of iron deficiency which will not change what pt is already doing.

## 2020-11-11 NOTE — PROGRESS NOTES
Vaccine Information Sheet, \"Influenza - Inactivated\"  given to Ole Sees, or parent/legal guardian of  Ole Sees and verbalized understanding. Patient responses:    Have you ever had a reaction to a flu vaccine? No  Do you have any current illness? No  Have you ever had Guillian Alba Syndrome? No  Do you have a serious allergy to any of the following: Neomycin, Polymyxin, Thimerosal, eggs or egg products? No    Flu vaccine given per order. Please see immunization tab. Risks and benefits explained. Current VIS given.

## 2020-11-11 NOTE — PROGRESS NOTES
Visit Information      No care team member to display    Medical History Review  Past Medical, Family, and Social History reviewed and does contribute to the patient presenting condition    Health Maintenance   Topic Date Due    Varicella vaccine (1 of 2 - 2-dose childhood series) 06/22/1988    Pneumococcal 0-64 years Vaccine (1 of 1 - PPSV23) 06/22/1993    DTaP/Tdap/Td vaccine (1 - Tdap) 06/22/2006    Cervical cancer screen  06/22/2008    Flu vaccine (1) 09/01/2020    Potassium monitoring  10/21/2021    Creatinine monitoring  10/21/2021    HIV screen  Completed    Hepatitis A vaccine  Aged Out    Hepatitis B vaccine  Aged Out    Hib vaccine  Aged Out    Meningococcal (ACWY) vaccine  Aged Out     SUBJECTIVE:  Tristan Omer is a 37 y.o. female who  comes for complaints of   Chief Complaint   Patient presents with    Established New Doctor     Here to establish care, complains of pain in abdomen and legs. States she has some swelling, has been going on months. Lasix not helping    Health Maintenance     due pap    Weight Loss     wants to discuss weight loss           Chronic conditions being monitored:    Concerns today:  Leg swelling bilateral  Duration- 10mth  Severity-moderate  Context-   Associated signs and symptoms- lower abdominal pain   Swelling now associaed with lower abdominal pain  Modifying factors- lasix is not helping  CT abdomn doen 10/20/2020 was unremarkable. Now getting pelvic pain    Chronic iron deficiency anemia  Seeing hematology for iron infusion- not yet started  Denies heavy periods- 4 days, clots only on 1-2days, denies any large clots. Due for labs today    Obesity  Feels she has been gaining more and more weight  BMI > 53  Increasing abdominal circumference.        REVIEW OF SYSTEMS (except Subjective (HPI))    CONSTITUTIONAL: No weight loss, malaise or fevers  HEENT: Negative for frequent or significant headaches, No changes in hearing or vision, no nose bleeds or other nasal problems  NECK: Negative for lumps, goiter, pain and significant neck swelling  RESPIRATORY: Negative for cough, hemoptysis, wheezing, or shortness of breath  CARDIOVASCULAR: Negative for chest pain, or palpitations, positive for leg swelling  GI: No nausea, vomiting, or diarrhea or Constipation  : some pelvic pain. ,No history of dysuria, frequency or incontinence, or hematuria  MUSCULOSKELETAL: Negative for joint pain or swelling  SKIN: Negative for lesions, rash, and itching  PSYCH: Negative for sleep disturbance, mood disorder and recent psychosocial stressors  NEURO: No history of headaches, syncope, paralysis, seizures or tremors    ACTIVE PROBLEM LIST  Patient Active Problem List   Diagnosis    Closed comminuted fracture of waist of scaphoid bone of right wrist    Right wrist pain    Anemia    Opioid use, unspecified with other opioid-induced disorder ([facility]    Morbid obesity with BMI of 40.0-44.9, adult ([facility]    History of heroin use    Symptomatic anemia    SOB (shortness of breath)    Leg swelling    Non-traumatic rhabdomyolysis    Iron deficiency anemia secondary to inadequate dietary iron intake    Iron malabsorption         PAST MEDICAL HISTORY:    Past Medical History:   Diagnosis Date    History of heroin use 9/16/2020       PAST SURGICAL HISTORY:    No past surgical history on file. FAMILY HISTORY:    No family history on file.      SOCIAL HISTORY:    Social History     Socioeconomic History    Marital status: Single     Spouse name: Not on file    Number of children: Not on file    Years of education: Not on file    Highest education level: Not on file   Occupational History    Not on file   Social Needs    Financial resource strain: Not on file    Food insecurity     Worry: Not on file     Inability: Not on file    Transportation needs     Medical: Not on file     Non-medical: Not on file   Tobacco Use    Smoking status: Current Every Day Smoker     Packs/day: 0.50     Types: Cigarettes    Smokeless tobacco: Never Used   Substance and Sexual Activity    Alcohol use: No    Drug use: No     Comment: on Methadone    Sexual activity: Not on file   Lifestyle    Physical activity     Days per week: Not on file     Minutes per session: Not on file    Stress: Not on file   Relationships    Social connections     Talks on phone: Not on file     Gets together: Not on file     Attends Mandaeism service: Not on file     Active member of club or organization: Not on file     Attends meetings of clubs or organizations: Not on file     Relationship status: Not on file    Intimate partner violence     Fear of current or ex partner: Not on file     Emotionally abused: Not on file     Physically abused: Not on file     Forced sexual activity: Not on file   Other Topics Concern    Not on file   Social History Narrative    Not on file       CURRENT MEDICATIONS:  Current Outpatient Medications   Medication Sig Dispense Refill    famotidine (PEPCID) 20 MG tablet Take 1 tablet by mouth 2 times daily 60 tablet 3    ferrous sulfate (FE TABS 325) 325 (65 Fe) MG EC tablet Take 1 tablet by mouth 3 times daily (with meals) 90 tablet 0    furosemide (LASIX) 20 MG tablet Take 1 tablet by mouth daily 30 tablet 0    sertraline (ZOLOFT) 50 MG tablet Take 1 tablet by mouth daily      QUETIAPINE FUMARATE PO Take 75 mg by mouth nightly       prazosin (MINIPRESS) 1 MG capsule Take 1 capsule by mouth nightly       methadone (DOLOPHINE) 10 MG tablet Take 125 mg by mouth daily.  ibuprofen (ADVIL;MOTRIN) 600 MG tablet Take 1 tablet by mouth every 6 hours as needed for Pain (Patient taking differently: Take 400 mg by mouth every 6 hours as needed for Pain ) 30 tablet 0     No current facility-administered medications for this visit.         OBJECTIVE:  Vitals:    11/11/20 1049   BP: 114/74   Pulse: 94   Temp: 97.7 °F (36.5 °C)   SpO2: 97%       Focal exam:    Abdominal Tenderness  in right

## 2020-11-17 RX ORDER — FUROSEMIDE 20 MG/1
20 TABLET ORAL DAILY
Qty: 30 TABLET | Refills: 0 | OUTPATIENT
Start: 2020-11-17

## 2020-11-17 RX ORDER — PNV NO.95/FERROUS FUM/FOLIC AC 28MG-0.8MG
TABLET ORAL
Qty: 90 TABLET | Refills: 2 | Status: SHIPPED | OUTPATIENT
Start: 2020-11-17 | End: 2021-01-13

## 2020-12-16 ENCOUNTER — VIRTUAL VISIT (OUTPATIENT)
Dept: INTERNAL MEDICINE CLINIC | Age: 35
End: 2020-12-16
Payer: MEDICAID

## 2020-12-16 DIAGNOSIS — Z20.822 SUSPECTED COVID-19 VIRUS INFECTION: Primary | ICD-10-CM

## 2020-12-16 DIAGNOSIS — D50.8 IRON DEFICIENCY ANEMIA SECONDARY TO INADEQUATE DIETARY IRON INTAKE: ICD-10-CM

## 2020-12-16 DIAGNOSIS — M79.89 LEG SWELLING: ICD-10-CM

## 2020-12-16 PROCEDURE — G2012 BRIEF CHECK IN BY MD/QHP: HCPCS | Performed by: NURSE PRACTITIONER

## 2020-12-16 RX ORDER — BUMETANIDE 1 MG/1
1 TABLET ORAL DAILY
Qty: 30 TABLET | Refills: 11 | Status: SHIPPED | OUTPATIENT
Start: 2020-12-16 | End: 2021-01-27

## 2020-12-16 NOTE — PROGRESS NOTES
Visit Information    Have you changed or started any medications since your last visit including any over-the-counter medicines, vitamins, or herbal medicines? no   Are you having any side effects from any of your medications? -  no  Have you stopped taking any of your medications? Is so, why? -  no    Have you seen any other physician or provider since your last visit? No  Have you had any other diagnostic tests since your last visit? No  Have you been seen in the emergency room and/or had an admission to a hospital since we last saw you? No  Have you had your routine dental cleaning in the past 6 months? no    Have you activated your [employer] account? If not, what are your barriers?  N     Patient Care Team:  Marin Lino MD as PCP - General (Internal Medicine)  Marin Lion MD as PCP - [facility]    Medical History Review  Past Medical, Family, and Social History reviewed and does not contribute to the patient presenting condition    Health Maintenance   Topic Date Due    Varicella vaccine (1 of 2 - 2-dose childhood series) 06/22/1988    DTaP/Tdap/Td vaccine (1 - Tdap) 06/22/2006    Cervical cancer screen  06/22/2008    Potassium monitoring  10/21/2021    Creatinine monitoring  10/21/2021    Flu vaccine  Completed    Pneumococcal 0-64 years Vaccine  Completed    Hepatitis C screen  Completed    HIV screen  Completed    Hepatitis A vaccine  Aged Out    Hepatitis B vaccine  Aged Out    Hib vaccine  Aged Out    Meningococcal (ACWY) vaccine  Aged Out         [address] [address]  Dept: [phone]  Dept Fax: [phone]    Virtual Visit Progress Note  Date of patient's visit: 1/5/2021  Patient's Name:  Jeromy Patel YOB: 1985            Patient Care Team:  Marin Lion MD as PCP - General (Internal Medicine)  Marin Lion MD as PCP - [facility]    REASON FOR VISIT:  Same day visit HISTORY OF PRESENT ILLNESS:      Chief Complaint   Patient presents with    Other     blood low and body swelling       History was obtained from the patient. Theone Sohan is a 37 y.o. female here today virtually for anemia follow up. Diffuse swelling on feet, calves, legs. Denies chest pain, shortness of breath    Sore throat started yesterday. Patient daughter tested positive for COVID. Patient has endorses increased fatigue, intermittent dry cough, abdominal pain,     Denies CP, SOB, fevers, chills, myalgias, nausea, vomiting. Denies loss of taste or smell. Marino Fregoso. Sleeping on cough sitting    Patient Active Problem List   Diagnosis    Closed comminuted fracture of waist of scaphoid bone of right wrist    Right wrist pain    Anemia    Opioid use, unspecified with other opioid-induced disorder ([address]    Morbid obesity with BMI of 40.0-44.9, adult ([facility]    History of heroin use    Symptomatic anemia    SOB (shortness of breath)    Leg swelling    Non-traumatic rhabdomyolysis    Iron deficiency anemia secondary to inadequate dietary iron intake    Iron malabsorption       MEDICATIONS:      Current Outpatient Medications   Medication Sig Dispense Refill    bumetanide (BUMEX) 1 MG tablet Take 1 tablet by mouth daily 30 tablet 11    Masks (CLEVER CHOICE FACE MASK) MISC USE NEW FACE MASK EVERYDAY WHEN PATIENT GO OUTSIDE OF HOME DUE TO COVID PANDEMIC 90 each 3    Ferrous Sulfate (IRON) 325 (65 Fe) MG TABS TAKE 1 TABLET BY MOUTH 3 TIMES DAILY (WITH MEALS) 90 tablet 2    famotidine (PEPCID) 20 MG tablet Take 1 tablet by mouth 2 times daily 60 tablet 3    sertraline (ZOLOFT) 50 MG tablet Take 1 tablet by mouth daily      QUETIAPINE FUMARATE PO Take 75 mg by mouth nightly       prazosin (MINIPRESS) 1 MG capsule Take 1 capsule by mouth nightly       methadone (DOLOPHINE) 10 MG tablet Take 125 mg by mouth daily.  ibuprofen (ADVIL;MOTRIN) 600 MG tablet Take 1 tablet by mouth every 6 hours as needed for Pain (Patient taking differently: Take 400 mg by mouth every 6 hours as needed for Pain ) 30 tablet 0     No current facility-administered medications for this visit. ALLERGIES:    No Known Allergies    SOCIAL HISTORY   Reviewed and no change from previous record. Edmundo Mendoza  reports that she has been smoking cigarettes. She has been smoking about 0.50 packs per day. She has never used smokeless tobacco.    FAMILY HISTORY:    Reviewed and No change from previous visit    REVIEW OF SYSTEMS:    Review of Systems   Constitutional: Positive for fatigue. Negative for chills, diaphoresis, fever and unexpected weight change. HENT: Negative for congestion, postnasal drip, rhinorrhea, sneezing, sore throat and trouble swallowing. Respiratory: Positive for cough. Negative for chest tightness, shortness of breath and wheezing. Cardiovascular: Positive for leg swelling. Negative for chest pain. Gastrointestinal: Positive for abdominal pain. Negative for constipation, diarrhea, nausea and vomiting. Endocrine: Negative for polydipsia, polyphagia and polyuria. Genitourinary: Negative for difficulty urinating, dysuria, flank pain, frequency and urgency. Musculoskeletal: Negative for arthralgias. Skin: Negative for color change and rash. Neurological: Negative for dizziness, tremors, syncope, weakness and numbness. Psychiatric/Behavioral: Negative for confusion and hallucinations. PHYSICAL EXAM:    There were no vitals filed for this visit. Exam was limited due to telephone encounter. General - alert, in no distress  Neurological - alert, oriented x 3, normal speech      ASSESSMENT AND PLAN:      1. Suspected COVID-19 virus infection  - COVID-19 Ambulatory; Future    2. Leg swelling  - bumetanide (BUMEX) 1 MG tablet; Take 1 tablet by mouth daily  Dispense: 30 tablet;  Refill: 11 3. Iron deficiency anemia secondary to inadequate dietary iron intake  -follows with Dr. Medina Dowd  -continue Fe supplimentation      FOLLOW UP AND INSTRUCTIONS:   Return in about 2 weeks (around 12/30/2020) for with Dr. Bertram Drummond. Discussed use, benefit, and side effects of prescribed medications. All patient questions answered. Patient voiced understanding. Patient given educational materials - see patient instructions    Patient being evaluated by a Virtual Visit (video visit) encounter to address concerns as mentioned above. A caregiver was present when appropriate. Due to this being a TeleHealth encounter (During [facility] public health emergency), evaluation of the following organ systems was limited: Vitals/Constitutional/EENT/Resp/CV/GI//MS/Neuro/Skin/Heme-Lymph-Imm. Pursuant to the emergency declaration under the [address] [place] [address], [address] [place] [address] authority and the [employer] and [employer] Act, this Virtual Visit was conducted with patient's (and/or legal guardian's) consent, to reduce the patient's risk of exposure to COVID-19 and provide necessary medical care. The patient (and/or legal guardian) has also been advised to contact this office for worsening conditions or problems, and seek emergency medical treatment and/or call 911 if deemed necessary. Services were provided through a video synchronous discussion virtually to substitute for in-person clinic visit. Patient and provider were located at their individual homes. MIKE Mojica - CNP   [facility]  1/5/2021, 8:45 AM    Please note that this chart wasgenerated using voice recognition Dragon dictation software. Although every effort was made to ensure the accuracy of this automated transcription, some errors in transcription may have occurred.

## 2021-01-04 ENCOUNTER — HOSPITAL ENCOUNTER (OUTPATIENT)
Age: 36
Discharge: HOME | End: 2021-01-04
Payer: MEDICAID

## 2021-01-05 ASSESSMENT — ENCOUNTER SYMPTOMS
COLOR CHANGE: 0
CONSTIPATION: 0
COUGH: 1
WHEEZING: 0
SORE THROAT: 0
SHORTNESS OF BREATH: 0
NAUSEA: 0
TROUBLE SWALLOWING: 0
RHINORRHEA: 0
VOMITING: 0
CHEST TIGHTNESS: 0
ABDOMINAL PAIN: 1
DIARRHEA: 0

## 2021-01-13 DIAGNOSIS — D50.9 IRON DEFICIENCY ANEMIA, UNSPECIFIED IRON DEFICIENCY ANEMIA TYPE: ICD-10-CM

## 2021-01-13 RX ORDER — FERROUS SULFATE 325(65) MG
TABLET ORAL
Qty: 90 TABLET | Refills: 2 | Status: SHIPPED | OUTPATIENT
Start: 2021-01-13

## 2021-01-27 ENCOUNTER — OFFICE VISIT (OUTPATIENT)
Dept: INTERNAL MEDICINE CLINIC | Age: 36
End: 2021-01-27
Payer: MEDICAID

## 2021-01-27 VITALS
HEIGHT: 61 IN | WEIGHT: 293 LBS | RESPIRATION RATE: 14 BRPM | TEMPERATURE: 98.3 F | SYSTOLIC BLOOD PRESSURE: 124 MMHG | HEART RATE: 80 BPM | DIASTOLIC BLOOD PRESSURE: 80 MMHG | BODY MASS INDEX: 55.32 KG/M2

## 2021-01-27 DIAGNOSIS — E66.01 MORBID OBESITY WITH BODY MASS INDEX OF 40.0-44.9 IN ADULT (HCC): ICD-10-CM

## 2021-01-27 DIAGNOSIS — M79.606 PAIN OF LOWER EXTREMITY, UNSPECIFIED LATERALITY: ICD-10-CM

## 2021-01-27 DIAGNOSIS — F11.988 OPIOID USE, UNSPECIFIED WITH OTHER OPIOID-INDUCED DISORDER (HCC): ICD-10-CM

## 2021-01-27 DIAGNOSIS — M79.89 LEG SWELLING: Primary | ICD-10-CM

## 2021-01-27 DIAGNOSIS — Z00.00 HEALTHCARE MAINTENANCE: ICD-10-CM

## 2021-01-27 PROCEDURE — G8417 CALC BMI ABV UP PARAM F/U: HCPCS | Performed by: INTERNAL MEDICINE

## 2021-01-27 PROCEDURE — G8482 FLU IMMUNIZE ORDER/ADMIN: HCPCS | Performed by: INTERNAL MEDICINE

## 2021-01-27 PROCEDURE — 99213 OFFICE O/P EST LOW 20 MIN: CPT | Performed by: INTERNAL MEDICINE

## 2021-01-27 PROCEDURE — 4004F PT TOBACCO SCREEN RCVD TLK: CPT | Performed by: INTERNAL MEDICINE

## 2021-01-27 PROCEDURE — G8427 DOCREV CUR MEDS BY ELIG CLIN: HCPCS | Performed by: INTERNAL MEDICINE

## 2021-01-27 RX ORDER — BUMETANIDE 1 MG/1
1 TABLET ORAL 2 TIMES DAILY
Qty: 30 TABLET | Refills: 11 | Status: SHIPPED | OUTPATIENT
Start: 2021-01-27 | End: 2022-10-18 | Stop reason: ALTCHOICE

## 2021-01-27 ASSESSMENT — PATIENT HEALTH QUESTIONNAIRE - PHQ9
1. LITTLE INTEREST OR PLEASURE IN DOING THINGS: 0
2. FEELING DOWN, DEPRESSED OR HOPELESS: 0
SUM OF ALL RESPONSES TO PHQ9 QUESTIONS 1 & 2: 0

## 2021-01-27 NOTE — PROGRESS NOTES
Visit Information    Have you changed or started any medications since your last visit including any over-the-counter medicines, vitamins, or herbal medicines? no   Are you having any side effects from any of your medications? -  no  Have you stopped taking any of your medications? Is so, why? -  no    Have you seen any other physician or provider since your last visit? No  Have you had any other diagnostic tests since your last visit? No  Have you been seen in the emergency room and/or had an admission to a hospital since we last saw you? No  Have you had your routine dental cleaning in the past 6 months? no    Have you activated your [employer] account? If not, what are your barriers? No:     Patient Care Team:  Marin Lion MD as PCP - General (Internal Medicine)  Marin Lion MD as PCP - [facility] Provider    Medical History Review  Past Medical, Family, and Social History reviewed and does contribute to the patient presenting condition    Health Maintenance   Topic Date Due    Varicella vaccine (1 of 2 - 2-dose childhood series) 06/22/1988    DTaP/Tdap/Td vaccine (1 - Tdap) 06/22/2006    Cervical cancer screen  06/22/2008    Potassium monitoring  10/21/2021    Creatinine monitoring  10/21/2021    Flu vaccine  Completed    Pneumococcal 0-64 years Vaccine  Completed    Hepatitis C screen  Completed    HIV screen  Completed    Hepatitis A vaccine  Aged Out    Hepatitis B vaccine  Aged Out    Hib vaccine  Aged Out    Meningococcal (ACWY) vaccine  Aged Out     Chief Complaint   Patient presents with    Edema     Previously started Bumex for swelling in legs. Pt states no relief from the new medication and still has swelling in legs and now some in hands. Pt denies any other concerns at this time. SUBJECTIVE:  Jeromy Patel is a 37 y.o. female patient who  comes for complaints of   Chief Complaint   Patient presents with    Edema     Previously started Bumex for swelling in legs.  Pt states no relief from the new medication and still has swelling in legs and now some in hands. Pt denies any other concerns at this time. Pelvic pain resolved    Legs and hands swelling  Duration-~1yr  Severity-  Context-ECHO Oct 2020 was normal  Associated signs and symptoms-  Worsening, some weight gain  Short of breath sometimes  Lasix/bumex did not help  Difficult to get up from sitting, reports proximal muscle weakness  ECHO and CT abdo done in Oct 2020- normal  Modifying factors- SOB worse on lying down    Wt Readings from Last 3 Encounters:   01/27/21 (!) 304 lb (137.9 kg)   11/11/20 283 lb (128.4 kg)   10/30/20 299 lb 14.4 oz (136 kg)         REVIEW OF SYSTEMS (except Subjective (HPI))  GENERAL: No fevers / chills  RESPIRATORY: Negative for cough, wheezing or shortness of breath  CARDIOVASCULAR: Negative for chest pain or palpitations.   GI: no nausea, vomiting, or diarrhea  NEURO: No history of headaches    Past Medical History:   Diagnosis Date    History of heroin use 9/16/2020       SOCIAL HISTORY:  Social History     Socioeconomic History    Marital status: Single     Spouse name: Not on file    Number of children: Not on file    Years of education: Not on file    Highest education level: Not on file   Occupational History    Not on file   Social Needs    Financial resource strain: Not on file    Food insecurity     Worry: Not on file     Inability: Not on file    Transportation needs     Medical: Not on file     Non-medical: Not on file   Tobacco Use    Smoking status: Current Every Day Smoker     Packs/day: 0.50     Types: Cigarettes    Smokeless tobacco: Never Used   Substance and Sexual Activity    Alcohol use: No    Drug use: No     Comment: on Methadone    Sexual activity: Not on file   Lifestyle    Physical activity     Days per week: Not on file     Minutes per session: Not on file    Stress: Not on file   Relationships    Social connections     Talks on phone: Not on file     Gets together: Not on file     Attends Latter-day service: Not on file     Active member of club or organization: Not on file     Attends meetings of clubs or organizations: Not on file     Relationship status: Not on file    Intimate partner violence     Fear of current or ex partner: Not on file     Emotionally abused: Not on file     Physically abused: Not on file     Forced sexual activity: Not on file   Other Topics Concern    Not on file   Social History Narrative    Not on file           CURRENT MEDICATIONS:  Current Outpatient Medications   Medication Sig Dispense Refill    ferrous sulfate (IRON 325) 325 (65 Fe) MG tablet TAKE 1 TABLET BY MOUTH 3 TIMES DAILY (WITH MEALS) 90 tablet 2    bumetanide (BUMEX) 1 MG tablet Take 1 tablet by mouth daily 30 tablet 11    Masks (CLEVER CHOICE FACE MASK) [facility] USE NEW FACE MASK EVERYDAY WHEN PATIENT GO OUTSIDE OF HOME DUE TO COVID PANDEMIC 90 each 3    famotidine (PEPCID) 20 MG tablet Take 1 tablet by mouth 2 times daily 60 tablet 3    sertraline (ZOLOFT) 50 MG tablet Take 1 tablet by mouth daily      QUETIAPINE FUMARATE PO Take 75 mg by mouth nightly       prazosin (MINIPRESS) 1 MG capsule Take 1 capsule by mouth nightly       methadone (DOLOPHINE) 10 MG tablet Take 125 mg by mouth daily.  ibuprofen (ADVIL;MOTRIN) 600 MG tablet Take 1 tablet by mouth every 6 hours as needed for Pain (Patient taking differently: Take 400 mg by mouth every 6 hours as needed for Pain ) 30 tablet 0     No current facility-administered medications for this visit. OBJECTIVE:  Vitals:    01/27/21 1111   BP: 124/80   Pulse: 80   Resp: 14   Temp: 98.3 °F (36.8 °C)     Body mass index is 57.44 kg/m². General exam (except above):  General appearance - well appearing, alert, in no acute distress  Head - Atraumatic, normocephalic  Eyes - EOMI, no jaundice or pallor  Lungs - Lungs clear to auscultation.   No wheezing, rhonchi, rales  Heart - RRR without murmur, gallop, or rubs. No ectopy  Abdomen - Abdomen soft, non-tender. Bowel sounds normal. No masses, organomegaly  Extremities -trace b/l  edema, no skin discoloration. Good capillary refill. Neuro - Pt Alert, awake and oriented x 3. No gross focal neurological deficits    ASSESSMENT AND PLAN (MEDICAL DECISION MAKING):     Amanda Dash was seen today for edema. Diagnoses and all orders for this visit:    Leg swelling  Comments:  with wt gain and generalized fatigue  need to r/o myositis   Orders:  -     C-Reactive Protein; Future  -     Sedimentation rate, automated; Future  -     ERIK Screen with Reflex; Future  -     TSH With Reflex Ft4; Future  -     D-Dimer, Quantitative; Future  -     bumetanide (BUMEX) 1 MG tablet; Take 1 tablet by mouth 2 times daily  -     Basic Metabolic Panel; Future  -     Comprehensive Metabolic Panel; Future  -     CK; Future    Pain of lower extremity, unspecified laterality    Opioid use, unspecified with other opioid-induced disorder ([facility]    Morbid obesity with body mass index of 40.0-44.9 in adult [facility])    Healthcare maintenance  Comments:  Due annual pap.          Follow up in:       Marika Ledezma MD

## 2021-02-05 ENCOUNTER — TELEPHONE (OUTPATIENT)
Dept: ONCOLOGY | Age: 36
End: 2021-02-05

## 2021-02-10 ENCOUNTER — HOSPITAL ENCOUNTER (OUTPATIENT)
Age: 36
Setting detail: SPECIMEN
Discharge: HOME | End: 2021-02-10
Payer: MEDICAID

## 2021-02-10 DIAGNOSIS — M79.89 LEG SWELLING: ICD-10-CM

## 2021-02-10 LAB
ALBUMIN SERPL-MCNC: 3.9 G/DL (ref 3.5–5.2)
ALBUMIN/GLOBULIN RATIO: 1.1 (ref 1–2.5)
ALP BLD-CCNC: 115 U/L (ref 35–104)
ALT SERPL-CCNC: 18 U/L (ref 5–33)
ANION GAP SERPL CALCULATED.3IONS-SCNC: 12 MMOL/L (ref 9–17)
AST SERPL-CCNC: 17 U/L
BILIRUB SERPL-MCNC: 0.25 MG/DL (ref 0.3–1.2)
BUN BLDV-MCNC: 11 MG/DL (ref 6–20)
BUN/CREAT BLD: ABNORMAL (ref 9–20)
C-REACTIVE PROTEIN: 24.6 MG/L (ref 0–5)
CALCIUM SERPL-MCNC: 8.8 MG/DL (ref 8.6–10.4)
CHLORIDE BLD-SCNC: 102 MMOL/L (ref 98–107)
CO2: 23 MMOL/L (ref 20–31)
CREAT SERPL-MCNC: 0.71 MG/DL (ref 0.5–0.9)
D-DIMER QUANTITATIVE: 0.5 MG/L FEU
GFR AFRICAN AMERICAN: >60 ML/MIN
GFR NON-AFRICAN AMERICAN: >60 ML/MIN
GFR SERPL CREATININE-BSD FRML MDRD: ABNORMAL ML/MIN/{1.73_M2}
GFR SERPL CREATININE-BSD FRML MDRD: ABNORMAL ML/MIN/{1.73_M2}
GLUCOSE BLD-MCNC: 85 MG/DL (ref 70–99)
POTASSIUM SERPL-SCNC: 4.3 MMOL/L (ref 3.7–5.3)
SEDIMENTATION RATE, ERYTHROCYTE: 40 MM (ref 0–20)
SODIUM BLD-SCNC: 137 MMOL/L (ref 135–144)
TOTAL CK: 194 U/L (ref 26–192)
TOTAL PROTEIN: 7.5 G/DL (ref 6.4–8.3)
TSH SERPL DL<=0.05 MIU/L-ACNC: 1.55 MIU/L (ref 0.3–5)

## 2021-02-10 RX ORDER — FAMOTIDINE 20 MG/1
20 TABLET, FILM COATED ORAL 2 TIMES DAILY
Qty: 60 TABLET | Refills: 3 | Status: SHIPPED | OUTPATIENT
Start: 2021-02-10 | End: 2021-06-02

## 2021-02-11 ENCOUNTER — TELEPHONE (OUTPATIENT)
Dept: INTERNAL MEDICINE CLINIC | Age: 36
End: 2021-02-11

## 2021-02-11 LAB — ANTI-NUCLEAR ANTIBODY (ANA): NEGATIVE

## 2021-02-11 NOTE — TELEPHONE ENCOUNTER
Mailed patient no show appointment letter #1 for the date of 2/11/2021 scheduled with Dr Diallo Castaneda.

## 2021-02-18 ENCOUNTER — TELEPHONE (OUTPATIENT)
Dept: INTERNAL MEDICINE CLINIC | Age: 36
End: 2021-02-18

## 2021-02-18 DIAGNOSIS — M79.10 MYALGIA: Primary | ICD-10-CM

## 2021-02-18 NOTE — TELEPHONE ENCOUNTER
Please inform patient blood test show some increase in markers of inflammation. I recommend she sees a rheumatologist for her leg pain.   Order filed  Fabrice Mittal

## 2021-03-22 ENCOUNTER — HOSPITAL ENCOUNTER (OUTPATIENT)
Age: 36
Setting detail: SPECIMEN
Discharge: HOME | End: 2021-03-22
Payer: MEDICAID

## 2021-03-22 ENCOUNTER — OFFICE VISIT (OUTPATIENT)
Dept: FAMILY MEDICINE CLINIC | Age: 36
End: 2021-03-22
Payer: MEDICAID

## 2021-03-22 VITALS
BODY MASS INDEX: 55.32 KG/M2 | HEART RATE: 82 BPM | OXYGEN SATURATION: 98 % | HEIGHT: 61 IN | WEIGHT: 293 LBS | TEMPERATURE: 97.6 F

## 2021-03-22 DIAGNOSIS — J01.10 ACUTE NON-RECURRENT FRONTAL SINUSITIS: Primary | ICD-10-CM

## 2021-03-22 DIAGNOSIS — J40 BRONCHITIS: ICD-10-CM

## 2021-03-22 DIAGNOSIS — B34.9 VIRAL ILLNESS: ICD-10-CM

## 2021-03-22 DIAGNOSIS — Z20.822 SUSPECTED COVID-19 VIRUS INFECTION: ICD-10-CM

## 2021-03-22 PROCEDURE — 4004F PT TOBACCO SCREEN RCVD TLK: CPT | Performed by: NURSE PRACTITIONER

## 2021-03-22 PROCEDURE — G8482 FLU IMMUNIZE ORDER/ADMIN: HCPCS | Performed by: NURSE PRACTITIONER

## 2021-03-22 PROCEDURE — G8417 CALC BMI ABV UP PARAM F/U: HCPCS | Performed by: NURSE PRACTITIONER

## 2021-03-22 PROCEDURE — 99214 OFFICE O/P EST MOD 30 MIN: CPT | Performed by: NURSE PRACTITIONER

## 2021-03-22 PROCEDURE — G8427 DOCREV CUR MEDS BY ELIG CLIN: HCPCS | Performed by: NURSE PRACTITIONER

## 2021-03-22 RX ORDER — AZITHROMYCIN 250 MG/1
250 TABLET, FILM COATED ORAL SEE ADMIN INSTRUCTIONS
Qty: 6 TABLET | Refills: 0 | Status: SHIPPED | OUTPATIENT
Start: 2021-03-22 | End: 2021-03-27

## 2021-03-22 RX ORDER — FLUTICASONE PROPIONATE 50 MCG
2 SPRAY, SUSPENSION (ML) NASAL DAILY
Qty: 1 BOTTLE | Refills: 0 | Status: SHIPPED | OUTPATIENT
Start: 2021-03-22

## 2021-03-22 RX ORDER — ALBUTEROL SULFATE 90 UG/1
2 AEROSOL, METERED RESPIRATORY (INHALATION) EVERY 4 HOURS PRN
Qty: 1 INHALER | Refills: 3 | Status: SHIPPED | OUTPATIENT
Start: 2021-03-22

## 2021-03-22 ASSESSMENT — ENCOUNTER SYMPTOMS
NAUSEA: 0
SWOLLEN GLANDS: 0
SINUS PAIN: 1
RHINORRHEA: 0
DIARRHEA: 1
COUGH: 1
VOMITING: 0
SORE THROAT: 0
WHEEZING: 0
ABDOMINAL PAIN: 0

## 2021-03-22 ASSESSMENT — PATIENT HEALTH QUESTIONNAIRE - PHQ9
SUM OF ALL RESPONSES TO PHQ QUESTIONS 1-9: 0
2. FEELING DOWN, DEPRESSED OR HOPELESS: 0
SUM OF ALL RESPONSES TO PHQ QUESTIONS 1-9: 0

## 2021-03-22 NOTE — PATIENT INSTRUCTIONS
Follow up with family doctor in 1 week as needed. Return immediately if worse, new symptoms develop, symptoms persist or have any questions or concerns. Push fluids, keep hydrated  Cool mist humidifier bedside  Continue all medications as prescribed  May alternate tylenol/motrin over the counter for pain or fever, take per package instructions. The COVID-19 test that was done today can take 1-6 days for results. Until then you should assume you have this disease and adhere to home isolation as described below. When we get the test results back, one of the following readings will be obtained. 1. A positive test means you have the virus. 2.  An inconclusive test means it wasn't sure if you have the virus or not. An inconclusive test result is treated as a positive result and recommendations  are the same as a positive test result. We may ask you to repeat this test in this circumstance. 3.  A negative test means you probably do not have the virus, but it is not conclusive. Prevention steps for People with positive or inconclusive test results or suspected  COVID-19 (including persons under investigation) who do not need to be hospitalized  and   People with confirmed COVID-19 who were hospitalized and determined to be medically stable to go home    You can be around others after:    10 days since symptoms first appeared and  24 hours with no fever without the use of fever-reducing medications and  Other symptoms of COVID-19 are improving*  *Loss of taste and smell may persist for weeks or months after recovery and need not delay the end of isolation    Most people do not require testing to decide when they can be around others; however, if your healthcare provider recommends testing, they will let you know when you can resume being around others based on your test results.     Note that these recommendations do not apply to persons with severe COVID-19 or with severely weakened immune systems (immunocompromised). These persons should follow the guidance below for I was severely ill with COVID-19 or have a severely weakened immune system (immunocompromised) due to a health condition or medication. When can I be around others?     [email or web address]    Contacts who are NOT healthcare providers or first responders and are asymptomatic (no fever,  cough, shortness of breath, or difficulty breathing) should self-quarantine for 14 days from the last  date of exposure to confirmed COVID-19. Your healthcare provider and public health staff will evaluate whether you can be cared for at home. If it is determined that you do not need to be hospitalized and can be isolated at home, you will be monitored by staff from your health department. You should follow the prevention steps below until a healthcare provider or local or state health department says you can return to your normal activities. Stay home except to get medical care  People who are mildly ill with COVID-19 are able to isolate at home during their illness. You should restrict activities outside your home, except for getting medical care. Do not go to work, school, or public areas. Avoid using public transportation, ride-sharing, or taxis. Separate yourself from other people and animals in your home  People: As much as possible, you should stay in a specific room and away from other people in your home. Also, you should use a separate bathroom, if available. Animals: You should restrict contact with pets and other animals while you are sick with COVID-19, just like you would around other people. When possible, have another member of your household care for your animals while you are sick. If you are sick with COVID-19, avoid contact with your pet, including petting, snuggling, being kissed or licked, and sharing food.  If you must care for your pet or be around animals while you are sick, wash your hands before and after you interact with pets and wear a facemask. Call ahead before visiting your doctor  If you have a medical appointment, call the healthcare provider and tell them that you have or may have COVID-19. This will help the healthcare providers office take steps to keep other people from getting infected or exposed. Wear a facemask  You should wear a facemask when you are around other people (e.g., sharing a room or vehicle) or pets and before you enter a healthcare providers office. If you are not able to wear a facemask (for example, because it causes trouble breathing), then people who live with you should not stay in the same room with you; they should also wear a facemask if they enter your room. Cover your coughs and sneezes  Cover your mouth and nose with a tissue when you cough or sneeze. Throw used tissues in a lined trash can. Immediately wash your hands with soap and water for at least 20 seconds or, if soap and water are not available, clean your hands with an alcohol-based hand  that contains at least 60% alcohol. Clean your hands often  Wash your hands often with soap and water for at least 20 seconds, especially after blowing your nose, coughing, or sneezing; going to the bathroom; and before eating or preparing food. If soap and water are not readily available, use an alcohol-based hand  with at least 60% alcohol, covering all surfaces of your hands and rubbing them together until they feel dry. Soap and water are the best option if hands are visibly dirty. Avoid touching your eyes, nose, and mouth with unwashed hands. Avoid sharing personal household items  You should not share dishes, drinking glasses, cups, eating utensils, towels, or bedding with other people or pets in your home. After using these items, they should be washed thoroughly with soap and water.   Clean all high-touch surfaces everyday  High touch surfaces include counters, tabletops, doorknobs, bathroom fixtures, toilets, phones, keyboards, tablets, and bedside tables. Also, clean any surfaces that may have blood, stool, or body fluids on them. Use a household cleaning spray or wipe, according to the label instructions. Labels contain instructions for safe and effective use of the cleaning product including precautions you should take when applying the product, such as wearing gloves and making sure you have good ventilation during use of the product. Monitor your symptoms  Seek prompt medical attention if your illness is worsening (e.g., difficulty breathing). Before seeking care, call your healthcare provider and tell them that you have, or are being evaluated for, COVID-19. Put on a facemask before you enter the facility. These steps will help the healthcare providers office to keep other people in the office or waiting room from getting infected or exposed. Persons who are placed under active monitoring or facilitated self-monitoring should follow instructions provided by their local health department or occupational health professionals, as appropriate. When working with your local health department check their available hours. If you have a medical emergency and need to call 911, notify the dispatch personnel that you have, or are being evaluated for COVID-19. If possible, put on a facemask before emergency medical services arrive. Discontinuing home isolation  Patients with confirmed COVID-19 should remain under home isolation precautions until the risk of secondary transmission to others is thought to be low. The decision to discontinue home isolation precautions should be made on a case-by-case basis, in consultation with your physician and the health department. Please do NOT make this decision on your own. If your results of the COVID-19 test is NEGATIVE -     The patient may stop isolation, in consultation with your health care provider, typically when:   Your healthcare provider has determined that the cause of the illness is NOT COVID-19 and approves your return to work. OR  Ten (10) days have passed since onset of symptoms AND one day (24 hours) have passed with no fever without taking medication (like Tylenol) to reduce fever,  respiratory symptoms have resolved and you have been evaluated by your health care provider. Please follow up with your physician for evaluation about this. The following websites are the best places for up to date information on this fluid situation. [email or web address]      Olu Ringer- keeps someone who might have been exposed to the virus away from others  Isolation  keeps someone who is infected with the virus away from others, even in their homes    Scenario 1    Your patient has close contact with an individual who has COVID-19. Your patient will not have further contact. Plan  Your patient is quarantined from the last day of contact for 14 days    Scenario 2   Your patient has lives with someone who has COVID-19 but can avoid further contact. Plan  Your patient is quarantined for 14 days starting when the person with COVID-19 begins home isolation. Scenario 3    Your patient is under quarantine and has additional close contact with someone else who has COVID-19. Plan  Your patient must restart quarantine from the last COVID-19 exposure. Scenario 4   Your patient lives with someone who has COVID-19 and cannot avoid close contact from them. Plan  Your patient is quarantined while the other person is isolating and for 14 days after covid  19 person meets the criteria to end home isolation. Patient Education        Bronchitis: Care Instructions  Your Care Instructions     Bronchitis is inflammation of the bronchial tubes, which carry air to the lungs. The tubes swell and produce mucus, or phlegm. The mucus and inflamed bronchial tubes make you cough. You may have trouble breathing.   Most cases of bronchitis are caused by viruses like those that cause colds. Antibiotics usually do not help and they may be harmful. Bronchitis usually develops rapidly and lasts about 2 to 3 weeks in otherwise healthy people. Follow-up care is a key part of your treatment and safety. Be sure to make and go to all appointments, and call your doctor if you are having problems. It's also a good idea to know your test results and keep a list of the medicines you take. How can you care for yourself at home? · Take all medicines exactly as prescribed. Call your doctor if you think you are having a problem with your medicine. · Get some extra rest.  · Take an over-the-counter pain medicine, such as acetaminophen (Tylenol), ibuprofen (Advil, Motrin), or naproxen (Aleve) to reduce fever and relieve body aches. Read and follow all instructions on the label. · Do not take two or more pain medicines at the same time unless the doctor told you to. Many pain medicines have acetaminophen, which is Tylenol. Too much acetaminophen (Tylenol) can be harmful. · Take an over-the-counter cough medicine that contains dextromethorphan to help quiet a dry, hacking cough so that you can sleep. Avoid cough medicines that have more than one active ingredient. Read and follow all instructions on the label. · Breathe moist air from a humidifier, hot shower, or sink filled with hot water. The heat and moisture will thin mucus so you can cough it out. · Do not smoke. Smoking can make bronchitis worse. If you need help quitting, talk to your doctor about stop-smoking programs and medicines. These can increase your chances of quitting for good. When should you call for help? Call 911 anytime you think you may need emergency care. For example, call if:    · You have severe trouble breathing. Call your doctor now or seek immediate medical care if:    · You have new or worse trouble breathing.     · You cough up dark brown or bloody mucus (sputum).   · You have a new or higher fever.     · You have a new rash. Watch closely for changes in your health, and be sure to contact your doctor if:    · You cough more deeply or more often, especially if you notice more mucus or a change in the color of your mucus.     · You are not getting better as expected. Where can you learn more? Go to https://chpepiceweb.[employer]. org and sign in to your [employer] account. Enter H333 in the Appwapp box to learn more about \"Bronchitis: Care Instructions. \"     If you do not have an account, please click on the \"Sign Up Now\" link. Current as of: October 26, 2020               Content Version: 12.8  © 2006-2021 [email or web address]. Care instructions adapted under license by [facility] ([facility]). If you have questions about a medical condition or this instruction, always ask your healthcare professional. [place] [address] any warranty or liability for your use of this information. Patient Education        Sinusitis: Care Instructions  Your Care Instructions     Sinusitis is an infection of the lining of the sinus cavities in your head. Sinusitis often follows a cold. It causes pain and pressure in your head and face. In most cases, sinusitis gets better on its own in 1 to 2 weeks. But some mild symptoms may last for several weeks. Sometimes antibiotics are needed. Follow-up care is a key part of your treatment and safety. Be sure to make and go to all appointments, and call your doctor if you are having problems. It's also a good idea to know your test results and keep a list of the medicines you take. How can you care for yourself at home? · Take an over-the-counter pain medicine, such as acetaminophen (Tylenol), ibuprofen (Advil, Motrin), or naproxen (Aleve). Read and follow all instructions on the label. · If the doctor prescribed antibiotics, take them as directed. Do not stop taking them just because you feel better.  You need to take the full course of antibiotics. · Be careful when taking over-the-counter cold or flu medicines and Tylenol at the same time. Many of these medicines have acetaminophen, which is Tylenol. Read the labels to make sure that you are not taking more than the recommended dose. Too much acetaminophen (Tylenol) can be harmful. · Breathe warm, moist air from a steamy shower, a hot bath, or a sink filled with hot water. Avoid cold, dry air. Using a humidifier in your home may help. Follow the directions for cleaning the machine. · Use saline (saltwater) nasal washes. This can help keep your nasal passages open and wash out mucus and bacteria. You can buy saline nose drops at a grocery store or drugstore. Or you can make your own at home by adding 1 teaspoon of salt and 1 teaspoon of baking soda to 2 cups of distilled water. If you make your own, fill a bulb syringe with the solution, insert the tip into your nostril, and squeeze gently. [place] your nose. · Put a hot, wet towel or a warm gel pack on your face 3 or 4 times a day for 5 to 10 minutes each time. · Try a decongestant nasal spray like oxymetazoline (Afrin). Do not use it for more than 3 days in a row. Using it for more than 3 days can make your congestion worse. When should you call for help? Call your doctor now or seek immediate medical care if:    · You have new or worse swelling or redness in your face or around your eyes.     · You have a new or higher fever. Watch closely for changes in your health, and be sure to contact your doctor if:    · You have new or worse facial pain.     · The mucus from your nose becomes thicker (like pus) or has new blood in it.     · You are not getting better as expected. Where can you learn more? Go to https://[employer]peIconix Bioscienceseb.[employer]. org and sign in to your [employer] account. Enter W299 in the Orthos box to learn more about \"Sinusitis: Care Instructions. \"     If you do not have an account, please click on the \"Sign Up Now\" link. Current as of: December 2, 2020               Content Version: 12.8  © 2006-2021 Healthwise[employer]orporated. Care instructions adapted under license by [facility] ([facility]). If you have questions about a medical condition or this instruction, always ask your healthcare professional. [address] any warranty or liability for your use of this information.

## 2021-03-22 NOTE — LETTER
[facility]   [address]  Phone: [phone]  Fax: [phone]    MIKE Green CNP        March 22, 2021     Patient: Jennifer Jeter   YOB: 1985   Date of Visit: 3/22/2021       To Whom It May Concern:  Israel Elizalde was tested for COVID today. It is my medical opinion that Gamaliel Canal should remain out of work until [employer] are back. .    If you have any questions or concerns, please don't hesitate to call.     Sincerely,        MIKE Green CNP

## 2021-03-22 NOTE — PROGRESS NOTES
[address] [facility]   [place]   Dept: [phone]  Dept Fax: [phone]    Sara Bacon is a 37 y.o. female who presents to the urgent care today for her medical conditions/complaints as notedbelow. Sara Bacon is c/o of Concern For COVID-19 (cough, fever, diarrhea body ache, weak, sob symptoms started thursday )      HPI:     37 yr old female presents for Concern For COVID-19 (cough, fever (during weekend - low grade - have resolved), diarrhea (loose brown stools) body ache, weak, sob (occasional with exertion) symptoms started thursday ) x4d  +smoker  Sx seemed better, no further fevers but now coughing up and blowing out thick green. Taking mucinex and tylenol with little relief  No hx lung issues or inhaler use. URI   This is a new problem. The current episode started in the past 7 days. The problem has been gradually worsening. The maximum temperature recorded prior to her arrival was 100.4 - 100.9 F. Associated symptoms include congestion, coughing, diarrhea, headaches and sinus pain. Pertinent negatives include no abdominal pain, chest pain, dysuria, ear pain, joint pain, joint swelling, nausea, neck pain, plugged ear sensation, rash, rhinorrhea, sneezing, sore throat, swollen glands, vomiting or wheezing. She has tried acetaminophen for the symptoms. The treatment provided no relief.        Past Medical History:   Diagnosis Date    History of heroin use 9/16/2020        Current Outpatient Medications   Medication Sig Dispense Refill    azithromycin (ZITHROMAX) 250 MG tablet Take 1 tablet by mouth See Admin Instructions for 5 days 500mg on day 1 followed by 250mg on days 2 - 5 6 tablet 0    fluticasone (FLONASE) 50 MCG/ACT nasal spray 2 sprays by Nasal route daily 1 Bottle 0    albuterol sulfate HFA (PROAIR HFA) 108 (90 Base) MCG/ACT inhaler Inhale 2 puffs into the lungs every 4 hours as needed for Wheezing 1 Inhaler 3    famotidine (PEPCID) 20 MG tablet TAKE 1 TABLET BY MOUTH 2 TIMES DAILY 60 tablet 3    bumetanide (BUMEX) 1 MG tablet Take 1 tablet by mouth 2 times daily 30 tablet 11    ferrous sulfate (IRON 325) 325 (65 Fe) MG tablet TAKE 1 TABLET BY MOUTH 3 TIMES DAILY (WITH MEALS) 90 tablet 2    Masks (CLEVER CHOICE FACE MASK[place] [facility] [place] FACE MASK EVERYDAY WHEN PATIENT GO OUTSIDE OF HOME DUE TO COVID PANDEMIC 90 each 3    sertraline (ZOLOFT) 50 MG tablet Take 1 tablet by mouth daily      QUETIAPINE FUMARATE PO Take 75 mg by mouth nightly       prazosin (MINIPRESS) 1 MG capsule Take 1 capsule by mouth nightly       methadone (DOLOPHINE) 10 MG tablet Take 125 mg by mouth daily.  ibuprofen (ADVIL;MOTRIN) 600 MG tablet Take 1 tablet by mouth every 6 hours as needed for Pain (Patient taking differently: Take 400 mg by mouth every 6 hours as needed for Pain ) 30 tablet 0     No current facility-administered medications for this visit. No Known Allergies    Subjective:      Review of Systems   HENT: Positive for congestion and sinus pain. Negative for ear pain, rhinorrhea, sneezing and sore throat. Respiratory: Positive for cough. Negative for wheezing. Cardiovascular: Negative for chest pain. Gastrointestinal: Positive for diarrhea. Negative for abdominal pain, nausea and vomiting. Genitourinary: Negative for dysuria. Musculoskeletal: Negative for joint pain and neck pain. Skin: Negative for rash. Neurological: Positive for headaches. All other systems reviewed and are negative. 14 systems reviewed and negative except as listed in HPI. Objective:     Physical Exam  Vitals signs and nursing note reviewed. Constitutional:       General: She is not in acute distress. Appearance: Normal appearance. She is not ill-appearing, toxic-appearing or diaphoretic. HENT:      Head: Normocephalic and atraumatic.       Right Ear: Tympanic membrane, ear canal and external ear normal.      Left Ear: Tympanic membrane, ear canal and external ear normal.      Nose: Congestion present. No rhinorrhea. Comments: lulu frontal sinus tenderness  No facial swelling or erythema     Mouth/Throat:      Mouth: Mucous membranes are moist.      Pharynx: No oropharyngeal exudate or posterior oropharyngeal erythema. Comments: + cobblestoning  Uvula midline no edema  Handling oral secretions without difficulty  Eyes:      General: No scleral icterus. Right eye: No discharge. Left eye: No discharge. Extraocular Movements: Extraocular movements intact. Conjunctiva/sclera: Conjunctivae normal.      Pupils: Pupils are equal, round, and reactive to light. Neck:      Musculoskeletal: Neck supple. Cardiovascular:      Rate and Rhythm: Normal rate and regular rhythm. Pulses: Normal pulses. Heart sounds: Normal heart sounds. Pulmonary:      Effort: Pulmonary effort is normal. No respiratory distress. Breath sounds: Normal breath sounds. No stridor. No wheezing, rhonchi or rales. Chest:      Chest wall: No tenderness. Abdominal:      General: Bowel sounds are normal. There is no distension. Palpations: Abdomen is soft. Tenderness: There is no abdominal tenderness. There is no guarding. Musculoskeletal:         General: No signs of injury. Comments: Ambulated to and from room, gait steady, moving all ext without difficulty   Skin:     General: Skin is warm and dry. Capillary Refill: Capillary refill takes less than 2 seconds. Findings: No rash ( no rash to visible skin). Neurological:      General: No focal deficit present. Mental Status: She is alert and oriented to person, place, and time.    Psychiatric:         Mood and Affect: Mood normal.         Behavior: Behavior normal.       Pulse 82   Temp 97.6 °F (36.4 °C) (Temporal)   Ht 5' 1\" (1.549 m)   Wt 298 lb (135.2 kg)   SpO2 98%   BMI 56.31 kg/m²     Assessment: Diagnosis Orders   1. Acute non-recurrent frontal sinusitis     2. Viral illness  COVID-19    azithromycin (ZITHROMAX) 250 MG tablet   3. Suspected COVID-19 virus infection  COVID-19    azithromycin (ZITHROMAX) 250 MG tablet   4. Bronchitis         Plan:    [place] covid, fever gone and sx better then worse  Vss, ls clear t/o  tx sinusitis and bronchitis with:  zpak  flonase   Alb inhaler  Close f/u pcp  Work note  Reviewed over-the-counter treatments for symptom management. Will send out COVID19 testing. Possible treatment alterations based on the results. Patient instructed to self-quarantine until testing results are back. Patient instructed not to return to work until results are back. Tylenol as needed for fever/pain. Encouraged adequate hydration and rest.  The patient indicates understanding of these issues and agrees with the plan. Educational materials provided on AVS.  Follow up if symptoms do not improve/worsen. Discussed symptoms that will warrant urgent ED evaluation/treatment. Return for make appt with Family Doc in 3-4 days for recheck. Orders Placed This Encounter   Medications    azithromycin (ZITHROMAX) 250 MG tablet     Sig: Take 1 tablet by mouth See Admin Instructions for 5 days 500mg on day 1 followed by 250mg on days 2 - 5     Dispense:  6 tablet     Refill:  0    fluticasone (FLONASE) 50 MCG/ACT nasal spray     Si sprays by Nasal route daily     Dispense:  1 Bottle     Refill:  0    albuterol sulfate HFA (PROAIR HFA) 108 (90 Base) MCG/ACT inhaler     Sig: Inhale 2 puffs into the lungs every 4 hours as needed for Wheezing     Dispense:  1 Inhaler     Refill:  3         Patient given educational materials - see patient instructions. Discussed use, benefit, and side effects of prescribed medications. All patient questions answered. Pt voicedunderstanding.     Electronically signed by MIKE Garcia CNP on 3/22/2021 at 2:22 PM

## 2021-03-23 DIAGNOSIS — B34.9 VIRAL ILLNESS: ICD-10-CM

## 2021-03-23 DIAGNOSIS — Z20.822 SUSPECTED COVID-19 VIRUS INFECTION: ICD-10-CM

## 2021-03-23 LAB
SARS-COV-2: NORMAL
SARS-COV-2: NOT DETECTED
SOURCE: NORMAL

## 2021-04-04 ENCOUNTER — HOSPITAL ENCOUNTER (EMERGENCY)
Age: 36
Discharge: HOME | End: 2021-04-05
Attending: EMERGENCY MEDICINE
Payer: MEDICAID

## 2021-04-04 VITALS
SYSTOLIC BLOOD PRESSURE: 145 MMHG | DIASTOLIC BLOOD PRESSURE: 85 MMHG | RESPIRATION RATE: 17 BRPM | OXYGEN SATURATION: 100 % | HEART RATE: 95 BPM

## 2021-04-04 DIAGNOSIS — R22.0 SWELLING OF RIGHT SIDE OF FACE: Primary | ICD-10-CM

## 2021-04-04 PROCEDURE — 84703 CHORIONIC GONADOTROPIN ASSAY: CPT

## 2021-04-04 PROCEDURE — 80053 COMPREHEN METABOLIC PANEL: CPT

## 2021-04-04 PROCEDURE — 99284 EMERGENCY DEPT VISIT MOD MDM: CPT

## 2021-04-04 PROCEDURE — 36415 COLL VENOUS BLD VENIPUNCTURE: CPT

## 2021-04-04 PROCEDURE — 6370000000 HC RX 637 (ALT 250 FOR IP): Performed by: STUDENT IN AN ORGANIZED HEALTH CARE EDUCATION/TRAINING PROGRAM

## 2021-04-04 PROCEDURE — 85025 COMPLETE CBC W/AUTO DIFF WBC: CPT

## 2021-04-04 RX ORDER — FAMOTIDINE 20 MG/1
20 TABLET, FILM COATED ORAL ONCE
Status: COMPLETED | OUTPATIENT
Start: 2021-04-04 | End: 2021-04-04

## 2021-04-04 RX ADMIN — FAMOTIDINE 20 MG: 20 TABLET ORAL at 23:50

## 2021-04-04 NOTE — LETTER
[facility]  [address] [facility] [address]  Phone: [phone]             April 5, 2021    Patient: Jessi Roe   YOB: 1985   Date of Visit: 4/4/2021       To Whom It May Concern:    Osiel Cochran was seen and treated in our emergency department on 4/4/2021. She may return to work on 4/52021.       Sincerely,             Signature:__________________________________

## 2021-04-05 ENCOUNTER — APPOINTMENT (OUTPATIENT)
Dept: GENERAL RADIOLOGY | Age: 36
End: 2021-04-05
Payer: MEDICAID

## 2021-04-05 LAB
ABSOLUTE EOS #: 0.2 K/UL (ref 0–0.4)
ABSOLUTE IMMATURE GRANULOCYTE: ABNORMAL K/UL (ref 0–0.3)
ABSOLUTE LYMPH #: 1.9 K/UL (ref 1–4.8)
ABSOLUTE MONO #: 0.4 K/UL (ref 0.1–1.3)
ALBUMIN SERPL-MCNC: 3.7 G/DL (ref 3.5–5.2)
ALBUMIN/GLOBULIN RATIO: ABNORMAL (ref 1–2.5)
ALP BLD-CCNC: 104 U/L (ref 35–104)
ALT SERPL-CCNC: 17 U/L (ref 5–33)
ANION GAP SERPL CALCULATED.3IONS-SCNC: 7 MMOL/L (ref 9–17)
AST SERPL-CCNC: 15 U/L
BASOPHILS # BLD: 1 % (ref 0–2)
BASOPHILS ABSOLUTE: 0.1 K/UL (ref 0–0.2)
BILIRUB SERPL-MCNC: <0.15 MG/DL (ref 0.3–1.2)
BUN BLDV-MCNC: 11 MG/DL (ref 6–20)
BUN/CREAT BLD: ABNORMAL (ref 9–20)
CALCIUM SERPL-MCNC: 8.6 MG/DL (ref 8.6–10.4)
CHLORIDE BLD-SCNC: 104 MMOL/L (ref 98–107)
CO2: 24 MMOL/L (ref 20–31)
CREAT SERPL-MCNC: 0.74 MG/DL (ref 0.5–0.9)
DIFFERENTIAL TYPE: ABNORMAL
EOSINOPHILS RELATIVE PERCENT: 2 % (ref 0–4)
GFR AFRICAN AMERICAN: >60 ML/MIN
GFR NON-AFRICAN AMERICAN: >60 ML/MIN
GFR SERPL CREATININE-BSD FRML MDRD: ABNORMAL ML/MIN/{1.73_M2}
GFR SERPL CREATININE-BSD FRML MDRD: ABNORMAL ML/MIN/{1.73_M2}
GLUCOSE BLD-MCNC: 130 MG/DL (ref 70–99)
HCG QUALITATIVE: NEGATIVE
HCT VFR BLD CALC: 30.2 % (ref 36–46)
HEMOGLOBIN: 9.4 G/DL (ref 12–16)
IMMATURE GRANULOCYTES: ABNORMAL %
LYMPHOCYTES # BLD: 26 % (ref 24–44)
MCH RBC QN AUTO: 21.6 PG (ref 26–34)
MCHC RBC AUTO-ENTMCNC: 31.3 G/DL (ref 31–37)
MCV RBC AUTO: 68.9 FL (ref 80–100)
MONOCYTES # BLD: 6 % (ref 1–7)
NRBC AUTOMATED: ABNORMAL PER 100 WBC
PDW BLD-RTO: 19.4 % (ref 11.5–14.9)
PLATELET # BLD: 307 K/UL (ref 150–450)
PLATELET ESTIMATE: ABNORMAL
PMV BLD AUTO: 8.1 FL (ref 6–12)
POTASSIUM SERPL-SCNC: 3.8 MMOL/L (ref 3.7–5.3)
RBC # BLD: 4.38 M/UL (ref 4–5.2)
RBC # BLD: ABNORMAL 10*6/UL
SEG NEUTROPHILS: 65 % (ref 36–66)
SEGMENTED NEUTROPHILS ABSOLUTE COUNT: 4.7 K/UL (ref 1.3–9.1)
SODIUM BLD-SCNC: 135 MMOL/L (ref 135–144)
TOTAL PROTEIN: 7.2 G/DL (ref 6.4–8.3)
WBC # BLD: 7.2 K/UL (ref 3.5–11)
WBC # BLD: ABNORMAL 10*3/UL

## 2021-04-05 PROCEDURE — 6360000002 HC RX W HCPCS: Performed by: STUDENT IN AN ORGANIZED HEALTH CARE EDUCATION/TRAINING PROGRAM

## 2021-04-05 PROCEDURE — 71046 X-RAY EXAM CHEST 2 VIEWS: CPT

## 2021-04-05 PROCEDURE — 96374 THER/PROPH/DIAG INJ IV PUSH: CPT

## 2021-04-05 RX ORDER — DEXAMETHASONE SODIUM PHOSPHATE 10 MG/ML
10 INJECTION, SOLUTION INTRAMUSCULAR; INTRAVENOUS ONCE
Status: COMPLETED | OUTPATIENT
Start: 2021-04-05 | End: 2021-04-05

## 2021-04-05 RX ADMIN — DEXAMETHASONE SODIUM PHOSPHATE 10 MG: 10 INJECTION, SOLUTION INTRAMUSCULAR; INTRAVENOUS at 00:33

## 2021-04-05 ASSESSMENT — ENCOUNTER SYMPTOMS
FACIAL SWELLING: 1
TROUBLE SWALLOWING: 0
PHOTOPHOBIA: 0
NAUSEA: 0
SINUS PRESSURE: 0
CHOKING: 0
SORE THROAT: 0
RHINORRHEA: 0
SINUS PAIN: 0
ABDOMINAL PAIN: 0
SHORTNESS OF BREATH: 0
VOICE CHANGE: 0
COUGH: 0

## 2021-04-05 NOTE — ED NOTES
Report given to [facility], RN from ER. Report method in person   The following was reviewed with receiving RN:   Current vital signs:  BP (!) 145/85   Pulse 95   Resp 17   SpO2 100%                      Any medication or safety alerts were reviewed. Any pending diagnostics and notifications were also reviewed, as well as any safety concerns or issues, abnormal labs, abnormal imaging, and abnormal assessment findings. Questions were answered.             Lexi Graham RN  04/05/21 3148

## 2021-04-05 NOTE — ED PROVIDER NOTES
[address]  Emergency Department Encounter  EmergencyMedicine Resident     Pt Name:Vannesa Riley  MRN: [id number]  [place] 1985  Date of evaluation: 4/5/21  PCP:  MD Vipul Medrano       Chief Complaint   Patient presents with    Heartburn    Facial Swelling       HISTORY OF PRESENT ILLNESS  (Location/Symptom, Timing/Onset, Context/Setting, Quality, Duration, Modifying Factors, Severity.)      Christophe Hua is a 37 y.o. female who presents with sudden onset of right-sided facial swelling denies any airway involvement any stridor any wheeze any cough any shortness of breath any fever any trauma any dental injuries any infections any pharyngitis any trouble swallowing or managing her secretions. Patient is also complaining about heartburn. He has had no new medications, new exposures, sick contacts. no history of the same, no ace/arbs or family history of angioedema    PAST MEDICAL / SURGICAL / SOCIAL / FAMILY HISTORY      has a past medical history of History of heroin use.     has no past surgical history on file.       Social History     Socioeconomic History    Marital status: Single     Spouse name: Not on file    Number of children: Not on file    Years of education: Not on file    Highest education level: Not on file   Occupational History    Not on file   Social Needs    Financial resource strain: Not on file    Food insecurity     Worry: Not on file     Inability: Not on file    Transportation needs     Medical: Not on file     Non-medical: Not on file   Tobacco Use    Smoking status: Current Every Day Smoker     Packs/day: 0.50     Types: Cigarettes    Smokeless tobacco: Never Used   Substance and Sexual Activity    Alcohol use: No    Drug use: No     Comment: on Methadone    Sexual activity: Not on file   Lifestyle    Physical activity     Days per week: Not on file     Minutes per session: Not on file    Stress: Not on file   Relationships    Social connections     Talks on phone: Not on file     Gets together: Not on file     Attends Church service: Not on file     Active member of club or organization: Not on file     Attends meetings of clubs or organizations: Not on file     Relationship status: Not on file    Intimate partner violence     Fear of current or ex partner: Not on file     Emotionally abused: Not on file     Physically abused: Not on file     Forced sexual activity: Not on file   Other Topics Concern    Not on file   Social History Narrative    Not on file       History reviewed. No pertinent family history. Allergies:  Patient has no known allergies. Home Medications:  Prior to Admission medications    Medication Sig Start Date End Date Taking? Authorizing Provider   fluticasone (FLONASE) 50 MCG/ACT nasal spray 2 sprays by Nasal route daily 3/22/21   MIKE Coburn CNP   albuterol sulfate HFA (PROAIR HFA) 108 (90 Base) MCG/ACT inhaler Inhale 2 puffs into the lungs every 4 hours as needed for Wheezing 3/22/21   MIKE Coburn CNP   famotidine (PEPCID) 20 MG tablet TAKE 1 TABLET BY MOUTH 2 TIMES DAILY 2/10/21   Merced Thompson MD   bumetanide (BUMEX) 1 MG tablet Take 1 tablet by mouth 2 times daily 1/27/21   Merced Thompson MD   ferrous sulfate (IRON 325) 325 (65 Fe) MG tablet TAKE 1 TABLET BY MOUTH 3 TIMES DAILY (WITH MEALS) 1/13/21   Merced Thompson MD   Masks ([facility]) [address] USE NEW FACE MASK EVERYDAY WHEN PATIENT GO OUTSIDE OF HOME DUE TO COVID PANDEMIC 12/9/20   Merced Thompson MD   sertraline (ZOLOFT) 50 MG tablet Take 1 tablet by mouth daily 9/2/20   Historical Provider, MD   QUETIAPINE FUMARATE PO Take 75 mg by mouth nightly  9/2/20   Historical Provider, MD   prazosin (MINIPRESS) 1 MG capsule Take 1 capsule by mouth nightly  9/2/20   Historical Provider, MD   methadone (DOLOPHINE) 10 MG tablet Take 125 mg by mouth daily.      Historical Provider, MD   ibuprofen (ADVIL;MOTRIN) 600 MG Nose: Nose normal.   Eyes:      Conjunctiva/sclera: Conjunctivae normal.   Neck:      Musculoskeletal: Normal range of motion and neck supple. No muscular tenderness. Cardiovascular:      Rate and Rhythm: Normal rate. Pulses: Normal pulses. Pulmonary:      Effort: Pulmonary effort is normal.   Abdominal:      Palpations: Abdomen is soft. Tenderness: There is no abdominal tenderness. Musculoskeletal: Normal range of motion. Skin:     General: Skin is warm. Capillary Refill: Capillary refill takes less than 2 seconds. Neurological:      Mental Status: She is alert and oriented to person, place, and time.    Psychiatric:         Mood and Affect: Mood normal.         DIFFERENTIAL  DIAGNOSIS     PLAN (LABS / IMAGING / EKG):  Orders Placed This Encounter   Procedures    XR CHEST (2 VW)    CBC with DIFF    Comprehensive Metabolic Panel    HCG Qualitative, Serum       MEDICATIONS ORDERED:  Orders Placed This Encounter   Medications    famotidine (PEPCID) tablet 20 mg    dexamethasone (PF) (DECADRON) injection 10 mg       DDX: idiopathic facial swelling    DIAGNOSTIC RESULTS / EMERGENCY DEPARTMENT COURSE / MDM   LAB RESULTS:  Results for orders placed or performed during the hospital encounter of 04/04/21   CBC with DIFF   Result Value Ref Range    WBC 7.2 3.5 - 11.0 k/uL    RBC 4.38 4.0 - 5.2 m/uL    Hemoglobin 9.4 (L) 12.0 - 16.0 g/dL    Hematocrit 30.2 (L) 36 - 46 %    MCV 68.9 (L) 80 - 100 fL    MCH 21.6 (L) 26 - 34 pg    MCHC 31.3 31 - 37 g/dL    RDW 19.4 (H) 11.5 - 14.9 %    Platelets 708 151 - 075 k/uL    MPV 8.1 6.0 - 12.0 fL    NRBC Automated NOT REPORTED per 100 WBC    Differential Type NOT REPORTED     Immature Granulocytes NOT REPORTED 0 %    Absolute Immature Granulocyte NOT REPORTED 0.00 - 0.30 k/uL    WBC Morphology NOT REPORTED     RBC Morphology NOT REPORTED     Platelet Estimate NOT REPORTED     Seg Neutrophils 65 36 - 66 %    Lymphocytes 26 24 - 44 %    Monocytes 6 1 - 7 %

## 2021-04-05 NOTE — ED PROVIDER NOTES
EMERGENCY DEPARTMENT ENCOUNTER   ATTENDING ATTESTATION     Pt Name: Khanh Iqbal  MRN: [id number]  [place] 1985  Date of evaluation: 4/5/21       Khanh Iqbal is a 37 y.o. female who presents with Heartburn and Facial Swelling      MDM: 63-year-old female presented for right-sided facial swelling and heartburn, on initial exam patient in no acute distress vitals are stable, patient is noted to have a mild swelling to the right side of the face, no signs of airway involvement, no lip or tongue swelling, no stridor noted, will obtain basic labs provide symptomatic treatment      Labs reviewed unremarkable, patient reports that she is feeling little bit better and would like to go home, discussed need for follow-up with her PCP and return precautions, patient voiced understanding    Patient/Guardian was informed of their diagnosis and told to follow up with PCP  in 1-3 days. Patient demonstrates understanding and agreement with the plan. They were given the opportunity to ask questions and those questions were answered to the best of our ability with the available information. Patient/Guardian told to return to the ED for any new, worsening, changing or persistent symptoms. This dictation was prepared using Vivisimo voice recognition software. Vitals:   Vitals:    04/04/21 2309   BP: (!) 145/85   Pulse: 95   Resp: 17   SpO2: 100%         I personally evaluated and examined the patient in conjunction with the resident and agree with the assessment, treatment plan, and disposition of the patient as recorded by the resident. I performed a history and physical examination of the patient and discussed management with the resident. I reviewed the residents note and agree with the documented findings and plan of care. Any areas of disagreement are noted on the chart. I was personally present for the key portions of any procedures.  I have documented in the chart those procedures where I was not

## 2021-06-01 ENCOUNTER — HOSPITAL ENCOUNTER (OUTPATIENT)
Age: 36
Setting detail: OBSERVATION
LOS: 1 days | Discharge: HOME | End: 2021-06-01
Attending: EMERGENCY MEDICINE | Admitting: FAMILY MEDICINE
Payer: MEDICAID

## 2021-06-01 ENCOUNTER — APPOINTMENT (OUTPATIENT)
Dept: GENERAL RADIOLOGY | Age: 36
End: 2021-06-01
Payer: MEDICAID

## 2021-06-01 VITALS
OXYGEN SATURATION: 98 % | BODY MASS INDEX: 55.32 KG/M2 | DIASTOLIC BLOOD PRESSURE: 95 MMHG | SYSTOLIC BLOOD PRESSURE: 136 MMHG | HEART RATE: 61 BPM | HEIGHT: 61 IN | RESPIRATION RATE: 16 BRPM | WEIGHT: 293 LBS | TEMPERATURE: 99.3 F

## 2021-06-01 DIAGNOSIS — R22.0 FACIAL SWELLING: Primary | ICD-10-CM

## 2021-06-01 LAB
ABSOLUTE EOS #: 0.14 K/UL (ref 0–0.44)
ABSOLUTE IMMATURE GRANULOCYTE: <0.03 K/UL (ref 0–0.3)
ABSOLUTE LYMPH #: 1.8 K/UL (ref 1.1–3.7)
ABSOLUTE MONO #: 0.43 K/UL (ref 0.1–1.2)
ALBUMIN SERPL-MCNC: 3.6 G/DL (ref 3.5–5.2)
ALBUMIN/GLOBULIN RATIO: 1 (ref 1–2.5)
ALP BLD-CCNC: 100 U/L (ref 35–104)
ALT SERPL-CCNC: 19 U/L (ref 5–33)
ANION GAP SERPL CALCULATED.3IONS-SCNC: 10 MMOL/L (ref 9–17)
AST SERPL-CCNC: 20 U/L
BASOPHILS # BLD: 0 % (ref 0–2)
BASOPHILS ABSOLUTE: <0.03 K/UL (ref 0–0.2)
BILIRUB SERPL-MCNC: 0.17 MG/DL (ref 0.3–1.2)
BNP INTERPRETATION: NORMAL
BUN BLDV-MCNC: 10 MG/DL (ref 6–20)
BUN/CREAT BLD: ABNORMAL (ref 9–20)
CALCIUM SERPL-MCNC: 8.3 MG/DL (ref 8.6–10.4)
CHLORIDE BLD-SCNC: 102 MMOL/L (ref 98–107)
CO2: 23 MMOL/L (ref 20–31)
CREAT SERPL-MCNC: 0.73 MG/DL (ref 0.5–0.9)
DIFFERENTIAL TYPE: ABNORMAL
EKG ATRIAL RATE: 102 BPM
EKG P AXIS: 44 DEGREES
EKG P-R INTERVAL: 152 MS
EKG Q-T INTERVAL: 354 MS
EKG QRS DURATION: 84 MS
EKG QTC CALCULATION (BAZETT): 461 MS
EKG R AXIS: -10 DEGREES
EKG T AXIS: 50 DEGREES
EKG VENTRICULAR RATE: 102 BPM
EOSINOPHILS RELATIVE PERCENT: 2 % (ref 1–4)
GFR AFRICAN AMERICAN: >60 ML/MIN
GFR NON-AFRICAN AMERICAN: >60 ML/MIN
GFR SERPL CREATININE-BSD FRML MDRD: ABNORMAL ML/MIN/{1.73_M2}
GFR SERPL CREATININE-BSD FRML MDRD: ABNORMAL ML/MIN/{1.73_M2}
GLUCOSE BLD-MCNC: 104 MG/DL (ref 70–99)
HCG QUALITATIVE: NEGATIVE
HCT VFR BLD CALC: 30.8 % (ref 36.3–47.1)
HEMOGLOBIN: 8.8 G/DL (ref 11.9–15.1)
IMMATURE GRANULOCYTES: 0 %
LYMPHOCYTES # BLD: 23 % (ref 24–43)
MCH RBC QN AUTO: 20.7 PG (ref 25.2–33.5)
MCHC RBC AUTO-ENTMCNC: 28.6 G/DL (ref 28.4–34.8)
MCV RBC AUTO: 72.3 FL (ref 82.6–102.9)
MONOCYTES # BLD: 6 % (ref 3–12)
NRBC AUTOMATED: 0 PER 100 WBC
PDW BLD-RTO: 18.4 % (ref 11.8–14.4)
PLATELET # BLD: 357 K/UL (ref 138–453)
PLATELET ESTIMATE: ABNORMAL
PMV BLD AUTO: 10 FL (ref 8.1–13.5)
POTASSIUM SERPL-SCNC: 4.4 MMOL/L (ref 3.7–5.3)
PRO-BNP: 25 PG/ML
RBC # BLD: 4.26 M/UL (ref 3.95–5.11)
RBC # BLD: ABNORMAL 10*6/UL
SEG NEUTROPHILS: 69 % (ref 36–65)
SEGMENTED NEUTROPHILS ABSOLUTE COUNT: 5.46 K/UL (ref 1.5–8.1)
SODIUM BLD-SCNC: 135 MMOL/L (ref 135–144)
TOTAL PROTEIN: 7.1 G/DL (ref 6.4–8.3)
TROPONIN INTERP: NORMAL
TROPONIN INTERP: NORMAL
TROPONIN T: NORMAL NG/ML
TROPONIN T: NORMAL NG/ML
TROPONIN, HIGH SENSITIVITY: <6 NG/L (ref 0–14)
TROPONIN, HIGH SENSITIVITY: <6 NG/L (ref 0–14)
WBC # BLD: 7.9 K/UL (ref 3.5–11.3)
WBC # BLD: ABNORMAL 10*3/UL

## 2021-06-01 PROCEDURE — 96375 TX/PRO/DX INJ NEW DRUG ADDON: CPT

## 2021-06-01 PROCEDURE — 84484 ASSAY OF TROPONIN QUANT: CPT

## 2021-06-01 PROCEDURE — 84703 CHORIONIC GONADOTROPIN ASSAY: CPT

## 2021-06-01 PROCEDURE — 93010 ELECTROCARDIOGRAM REPORT: CPT | Performed by: INTERNAL MEDICINE

## 2021-06-01 PROCEDURE — 99283 EMERGENCY DEPT VISIT LOW MDM: CPT

## 2021-06-01 PROCEDURE — 2500000003 HC RX 250 WO HCPCS: Performed by: STUDENT IN AN ORGANIZED HEALTH CARE EDUCATION/TRAINING PROGRAM

## 2021-06-01 PROCEDURE — 80053 COMPREHEN METABOLIC PANEL: CPT

## 2021-06-01 PROCEDURE — 71045 X-RAY EXAM CHEST 1 VIEW: CPT

## 2021-06-01 PROCEDURE — 93005 ELECTROCARDIOGRAM TRACING: CPT | Performed by: STUDENT IN AN ORGANIZED HEALTH CARE EDUCATION/TRAINING PROGRAM

## 2021-06-01 PROCEDURE — 83880 ASSAY OF NATRIURETIC PEPTIDE: CPT

## 2021-06-01 PROCEDURE — 99219 PR INITIAL OBSERVATION CARE/DAY 50 MINUTES: CPT | Performed by: FAMILY MEDICINE

## 2021-06-01 PROCEDURE — 96374 THER/PROPH/DIAG INJ IV PUSH: CPT

## 2021-06-01 PROCEDURE — 85025 COMPLETE CBC W/AUTO DIFF WBC: CPT

## 2021-06-01 PROCEDURE — 6360000002 HC RX W HCPCS: Performed by: STUDENT IN AN ORGANIZED HEALTH CARE EDUCATION/TRAINING PROGRAM

## 2021-06-01 RX ORDER — FAMOTIDINE 20 MG/1
20 TABLET, FILM COATED ORAL 2 TIMES DAILY
Status: DISCONTINUED | OUTPATIENT
Start: 2021-06-01 | End: 2021-06-01 | Stop reason: HOSPADM

## 2021-06-01 RX ORDER — SODIUM CHLORIDE 0.9 % (FLUSH) 0.9 %
5-40 SYRINGE (ML) INJECTION EVERY 12 HOURS SCHEDULED
Status: DISCONTINUED | OUTPATIENT
Start: 2021-06-01 | End: 2021-06-01 | Stop reason: HOSPADM

## 2021-06-01 RX ORDER — DIPHENHYDRAMINE HYDROCHLORIDE 50 MG/ML
25 INJECTION INTRAMUSCULAR; INTRAVENOUS ONCE
Status: COMPLETED | OUTPATIENT
Start: 2021-06-01 | End: 2021-06-01

## 2021-06-01 RX ORDER — DIPHENHYDRAMINE HCL 25 MG
25 CAPSULE ORAL EVERY 6 HOURS PRN
Qty: 20 CAPSULE | Refills: 0 | Status: SHIPPED | OUTPATIENT
Start: 2021-06-01 | End: 2021-06-06

## 2021-06-01 RX ORDER — POTASSIUM CHLORIDE 7.45 MG/ML
10 INJECTION INTRAVENOUS PRN
Status: DISCONTINUED | OUTPATIENT
Start: 2021-06-01 | End: 2021-06-01 | Stop reason: HOSPADM

## 2021-06-01 RX ORDER — METHYLPREDNISOLONE SODIUM SUCCINATE 125 MG/2ML
125 INJECTION, POWDER, LYOPHILIZED, FOR SOLUTION INTRAMUSCULAR; INTRAVENOUS ONCE
Status: COMPLETED | OUTPATIENT
Start: 2021-06-01 | End: 2021-06-01

## 2021-06-01 RX ORDER — NICOTINE 21 MG/24HR
1 PATCH, TRANSDERMAL 24 HOURS TRANSDERMAL DAILY PRN
Status: DISCONTINUED | OUTPATIENT
Start: 2021-06-01 | End: 2021-06-01 | Stop reason: HOSPADM

## 2021-06-01 RX ORDER — QUETIAPINE FUMARATE 50 MG/1
50 TABLET, EXTENDED RELEASE ORAL NIGHTLY
Status: DISCONTINUED | OUTPATIENT
Start: 2021-06-01 | End: 2021-06-01 | Stop reason: HOSPADM

## 2021-06-01 RX ORDER — MAGNESIUM SULFATE 1 G/100ML
1000 INJECTION INTRAVENOUS PRN
Status: DISCONTINUED | OUTPATIENT
Start: 2021-06-01 | End: 2021-06-01 | Stop reason: HOSPADM

## 2021-06-01 RX ORDER — SODIUM CHLORIDE 9 MG/ML
25 INJECTION, SOLUTION INTRAVENOUS PRN
Status: DISCONTINUED | OUTPATIENT
Start: 2021-06-01 | End: 2021-06-01 | Stop reason: HOSPADM

## 2021-06-01 RX ORDER — PREDNISONE 20 MG/1
40 TABLET ORAL DAILY
Qty: 10 TABLET | Refills: 0 | Status: SHIPPED | OUTPATIENT
Start: 2021-06-01 | End: 2021-06-06

## 2021-06-01 RX ORDER — METHYLPREDNISOLONE SODIUM SUCCINATE 125 MG/2ML
40 INJECTION, POWDER, LYOPHILIZED, FOR SOLUTION INTRAMUSCULAR; INTRAVENOUS EVERY 8 HOURS
Status: DISCONTINUED | OUTPATIENT
Start: 2021-06-01 | End: 2021-06-01 | Stop reason: HOSPADM

## 2021-06-01 RX ORDER — POLYETHYLENE GLYCOL 3350 17 G/17G
17 POWDER, FOR SOLUTION ORAL DAILY PRN
Status: DISCONTINUED | OUTPATIENT
Start: 2021-06-01 | End: 2021-06-01 | Stop reason: HOSPADM

## 2021-06-01 RX ORDER — DIPHENHYDRAMINE HYDROCHLORIDE 50 MG/ML
25 INJECTION INTRAMUSCULAR; INTRAVENOUS EVERY 6 HOURS PRN
Status: DISCONTINUED | OUTPATIENT
Start: 2021-06-01 | End: 2021-06-01 | Stop reason: HOSPADM

## 2021-06-01 RX ORDER — PROMETHAZINE HYDROCHLORIDE 12.5 MG/1
12.5 TABLET ORAL EVERY 6 HOURS PRN
Status: DISCONTINUED | OUTPATIENT
Start: 2021-06-01 | End: 2021-06-01 | Stop reason: HOSPADM

## 2021-06-01 RX ORDER — LANOLIN ALCOHOL/MO/W.PET/CERES
325 CREAM (GRAM) TOPICAL
Status: DISCONTINUED | OUTPATIENT
Start: 2021-06-01 | End: 2021-06-01 | Stop reason: HOSPADM

## 2021-06-01 RX ORDER — POTASSIUM CHLORIDE 20 MEQ/1
40 TABLET, EXTENDED RELEASE ORAL PRN
Status: DISCONTINUED | OUTPATIENT
Start: 2021-06-01 | End: 2021-06-01 | Stop reason: HOSPADM

## 2021-06-01 RX ORDER — SODIUM CHLORIDE 0.9 % (FLUSH) 0.9 %
10 SYRINGE (ML) INJECTION PRN
Status: DISCONTINUED | OUTPATIENT
Start: 2021-06-01 | End: 2021-06-01 | Stop reason: HOSPADM

## 2021-06-01 RX ORDER — ONDANSETRON 2 MG/ML
4 INJECTION INTRAMUSCULAR; INTRAVENOUS EVERY 6 HOURS PRN
Status: DISCONTINUED | OUTPATIENT
Start: 2021-06-01 | End: 2021-06-01 | Stop reason: HOSPADM

## 2021-06-01 RX ORDER — FUROSEMIDE 10 MG/ML
40 INJECTION INTRAMUSCULAR; INTRAVENOUS ONCE
Status: DISCONTINUED | OUTPATIENT
Start: 2021-06-01 | End: 2021-06-01

## 2021-06-01 RX ORDER — ACETAMINOPHEN 650 MG/1
650 SUPPOSITORY RECTAL EVERY 6 HOURS PRN
Status: DISCONTINUED | OUTPATIENT
Start: 2021-06-01 | End: 2021-06-01 | Stop reason: HOSPADM

## 2021-06-01 RX ORDER — ALBUTEROL SULFATE 2.5 MG/3ML
2.5 SOLUTION RESPIRATORY (INHALATION)
Status: DISCONTINUED | OUTPATIENT
Start: 2021-06-01 | End: 2021-06-01 | Stop reason: HOSPADM

## 2021-06-01 RX ORDER — ACETAMINOPHEN 325 MG/1
650 TABLET ORAL EVERY 6 HOURS PRN
Status: DISCONTINUED | OUTPATIENT
Start: 2021-06-01 | End: 2021-06-01 | Stop reason: HOSPADM

## 2021-06-01 RX ADMIN — METHYLPREDNISOLONE SODIUM SUCCINATE 125 MG: 125 INJECTION, POWDER, FOR SOLUTION INTRAMUSCULAR; INTRAVENOUS at 01:00

## 2021-06-01 RX ADMIN — DIPHENHYDRAMINE HYDROCHLORIDE 25 MG: 50 INJECTION INTRAMUSCULAR; INTRAVENOUS at 01:01

## 2021-06-01 RX ADMIN — FAMOTIDINE 20 MG: 10 INJECTION, SOLUTION INTRAVENOUS at 01:01

## 2021-06-01 ASSESSMENT — ENCOUNTER SYMPTOMS
TROUBLE SWALLOWING: 0
BLOOD IN STOOL: 0
RHINORRHEA: 0
VOICE CHANGE: 0
CONSTIPATION: 0
CHEST TIGHTNESS: 0
SORE THROAT: 0
SHORTNESS OF BREATH: 0
VOMITING: 0
ABDOMINAL PAIN: 0
COLOR CHANGE: 0
COUGH: 0
DIARRHEA: 0
BACK PAIN: 0
WHEEZING: 0
SHORTNESS OF BREATH: 1
NAUSEA: 0
FACIAL SWELLING: 1

## 2021-06-01 NOTE — ED NOTES
Bed: 37  Expected date:   Expected time:   Means of arrival:   Comments:     Christian Don RN  06/01/21 3100

## 2021-06-01 NOTE — ED NOTES
Pt resting in stretcher with eyes closed. Respirations even and nonlabored. No acute distress noted.      Ca Norris RN  06/01/21 7053

## 2021-06-01 NOTE — Clinical Note
Patient Class: Observation [104]   REQUIRED: Diagnosis: Swelling of right side of face [1408449]   Estimated Length of Stay: Estimated stay of less than 2 midnights   Telemetry/Cardiac Monitoring Required?: Yes

## 2021-06-01 NOTE — ED TRIAGE NOTES
Pt states she has had swelling in her legs and face since she woke up. She states the swelling in her face has progressively gotten worse throughout the day.

## 2021-06-01 NOTE — ED PROVIDER NOTES
[address] [facility]     Emergency Department     Faculty Attestation    I performed a history and physical examination of the patient and discussed management with the resident. I have reviewed and agree with the residents findings including all diagnostic interpretations, and treatment plans as written. Any areas of disagreement are noted on the chart. I was personally present for the key portions of any procedures. I have documented in the chart those procedures where I was not present during the key portions. I have reviewed the emergency nurses triage note. I agree with the chief complaint, past medical history, past surgical history, allergies, medications, social and family history as documented unless otherwise noted below. Documentation of the HPI, Physical Exam and Medical Decision Making performed by scribmeena is based on my personal performance of the HPI, PE and MDM. For Physician Assistant/ Nurse Practitioner cases/documentation I have personally evaluated this patient and have completed at least one if not all key elements of the E/M (history, physical exam, and MDM). Additional findings are as noted.     38 yo F r upper lip swelling starting today, no throat complaint, or swelling, no n/v/d, no cp or sob, pt noted marked swelling bilateral lower extremities / feet,   pe Olinda RN escort for exam  Airway intact, swelling r upper lip, posterior pharynx clear, pt controlling secretions, speech clear, tongue midline without swelling, neck supple, abdomen non tender, no distension, no rigidity, 3+ ankle & foot edema, no calf tenderness,     Angioedema type process, wbc stab le, hcg-, trop < 6, admit for further monitor    EKG Interpretation    Interpreted by me  Sinus tachycardia, hr 102, no ischemia, v4-v6, (new compared to ekg from 10-19-21), normal axis, qtc 461    CRITICAL CARE: There was a high probability of clinically significant/life threatening deterioration in this patient's condition which required my urgent intervention. Total critical care time was 10 minutes. This excludes any time for separately reportable procedures.        [facility] 24, DO  06/01/21 [address] DO  06/01/21 [address] DO  06/01/21 3887

## 2021-06-01 NOTE — ED PROVIDER NOTES
[facility]  ED  Emergency Department Encounter  Emergency Medicine Resident     Pt Name: Samantha Peguero  MRN: [id number]  [place] 1985  Date of evaluation: 6/1/21  PCP:  Donnetta Ahumada, MD    [address] [place] [address]       Chief Complaint   Patient presents with    Facial Swelling    Leg Swelling       HISTORY OFPRESENT ILLNESS  (Location/Symptom, Timing/Onset, Context/Setting, Quality, Duration, Modifying Factors,Severity.)      Samantha Peguero is a 37 y.o. female who presents with leg and facial swelling. Patient states this is continued to become more severe over the course of today. She notes a history of leg swelling in the past and has been through work-up that did not reveal any cause of the swelling. At that time, she was initially placed on Lasix, then transition to Bumex without any improvement. She denies taking any daily medications at this time. Facial swelling is more on the right than the left, but not associated with any tooth pain, new medications, new supplements, or any other changes that she can think of. Leg swelling is bilateral, not associated with tenderness in the calves or rash or warmth. No associated chest pain, shortness of breath, difficulty breathing or swallowing, abdominal pain, nausea, vomiting, urinary or bowel complaints. Patient feels weak generally, but has been able to ambulate and does not have any focal weakness. Patient denies alcohol, drug, tobacco use. PAST MEDICAL / SURGICAL / SOCIAL / FAMILY HISTORY      has a past medical history of History of heroin use.     has no past surgical history on file.      Social History     Socioeconomic History    Marital status: Single     Spouse name: Not on file    Number of children: Not on file    Years of education: Not on file    Highest education level: Not on file   Occupational History    Not on file   Tobacco Use    Smoking status: Current Every Day Smoker     Packs/day: 0.50     Types: Cigarettes  Smokeless tobacco: Never Used   Vaping Use    Vaping Use: Never used   Substance and Sexual Activity    Alcohol use: No    Drug use: No     Comment: on Methadone    Sexual activity: Not on file   Other Topics Concern    Not on file   Social History Narrative    Not on file     Social Determinants of Health     Financial Resource Strain:     Difficulty of Paying Living Expenses:    Food Insecurity:     Worried About Running Out of Food in the Last Year:     [address] in the Last Year:    Transportation Needs:     Lack of Transportation (Medical):  Lack of Transportation (Non-Medical):    Physical Activity:     Days of Exercise per Week:     Minutes of Exercise per Session:    Stress:     Feeling of Stress :    Social Connections:     Frequency of Communication with Friends and Family:     Frequency of Social Gatherings with Friends and Family:     Attends Jew Services:     Active Member of Clubs or Organizations:     Attends Club or Organization Meetings:     Marital Status:    Intimate Partner Violence:     Fear of Current or Ex-Partner:     Emotionally Abused:     Physically Abused:     Sexually Abused:        History reviewed. No pertinent family history. Allergies:  Patient has no known allergies. Home Medications:  Prior to Admission medications    Medication Sig Start Date End Date Taking?  Authorizing Provider   fluticasone (FLONASE) 50 MCG/ACT nasal spray 2 sprays by Nasal route daily 3/22/21   MIKE Garsia CNP   albuterol sulfate HFA (PROAIR HFA) 108 (90 Base) MCG/ACT inhaler Inhale 2 puffs into the lungs every 4 hours as needed for Wheezing 3/22/21   MIKE Garsia CNP   famotidine (PEPCID) 20 MG tablet TAKE 1 TABLET BY MOUTH 2 TIMES DAILY 2/10/21   Gloria Meier MD   bumetanide (BUMEX) 1 MG tablet Take 1 tablet by mouth 2 times daily 1/27/21   Gloria Meier MD   ferrous sulfate (IRON 325) 325 (65 Fe) MG tablet TAKE 1 TABLET BY MOUTH 3 TIMES DAILY (WITH MEALS) 1/13/21   Angela Gaytan MD   Masks ([address]) [address] USE NEW FACE MASK EVERYDAY WHEN PATIENT GO OUTSIDE OF HOME DUE TO COVID PANDEMIC 12/9/20   Angela Gaytan MD   sertraline (ZOLOFT) 50 MG tablet Take 1 tablet by mouth daily 9/2/20   Historical Provider, MD   QUETIAPINE FUMARATE PO Take 75 mg by mouth nightly  9/2/20   Historical Provider, MD   prazosin (MINIPRESS) 1 MG capsule Take 1 capsule by mouth nightly  9/2/20   Historical Provider, MD   methadone (DOLOPHINE) 10 MG tablet Take 120 mg by mouth daily. Historical Provider, MD   ibuprofen (ADVIL;MOTRIN) 600 MG tablet Take 1 tablet by mouth every 6 hours as needed for Pain  Patient taking differently: Take 400 mg by mouth every 6 hours as needed for Pain  8/29/18   Bill Abrams PA-C       REVIEW OFSYSTEMS    (2-9 systems for level 4, 10 or more for level 5)      Review of Systems   Constitutional: Negative for chills and fever. HENT: Positive for facial swelling. Negative for congestion, rhinorrhea, sore throat, trouble swallowing and voice change. Eyes: Negative for visual disturbance. Respiratory: Positive for shortness of breath (mild). Negative for cough. Cardiovascular: Negative for chest pain. Gastrointestinal: Negative for abdominal pain, diarrhea, nausea and vomiting. Genitourinary: Negative for dysuria. Musculoskeletal: Negative for back pain and neck pain. Skin: Negative for color change and rash. Neurological: Negative for weakness, numbness and headaches. PHYSICAL EXAM   (up to 7 for level 4, 8 or more forlevel 5)      INITIAL VITALS:   ED Triage Vitals   BP Temp Temp src Pulse Resp SpO2 Height Weight   06/01/21 0031 06/01/21 0031 -- 06/01/21 0031 06/01/21 0031 06/01/21 0031 06/01/21 0034 06/01/21 0034   (!) 152/76 99.3 °F (37.4 °C)  101 21 99 % 5' 1\" (1.549 m) (!) 319 lb (144.7 kg)       Physical Exam  Constitutional:       General: She is not in acute distress. Appearance: Normal appearance. She is normal weight. She is not ill-appearing, toxic-appearing or diaphoretic. HENT:      Head: Normocephalic and atraumatic. Comments: Obvious facial swelling, more right-sided than left. No overlying rash or lesions. Oropharynx is clear and moist, potentially edematous as well, tonsils appear swollen but not erythematous. No exudates. Airway otherwise intact. No voice change. Nose: Nose normal.      Mouth/Throat:      Mouth: Mucous membranes are moist.      Pharynx: Oropharynx is clear. No oropharyngeal exudate or posterior oropharyngeal erythema. Eyes:      Extraocular Movements: Extraocular movements intact. Pupils: Pupils are equal, round, and reactive to light. Cardiovascular:      Rate and Rhythm: Normal rate and regular rhythm. Heart sounds: Normal heart sounds. No murmur heard. Pulmonary:      Effort: Pulmonary effort is normal. No respiratory distress. Breath sounds: Normal breath sounds. No wheezing, rhonchi or rales. Abdominal:      General: There is no distension. Palpations: Abdomen is soft. Tenderness: There is no abdominal tenderness. There is no guarding or rebound. Musculoskeletal:         General: Normal range of motion. Cervical back: Normal range of motion and neck supple. No rigidity or tenderness. Comments: Bilateral lower extremities with 2+ pitting edema, mild tenderness palpation bilaterally, attributed to the edema, no obvious calf tenderness. Lymphadenopathy:      Cervical: No cervical adenopathy. Skin:     General: Skin is warm and dry. Findings: No erythema, lesion or rash. Neurological:      General: No focal deficit present. Mental Status: She is alert and oriented to person, place, and time. Motor: No weakness.    Psychiatric:         Mood and Affect: Mood normal.         DIFFERENTIAL  DIAGNOSIS     PLAN (LABS / IMAGING / EKG):  Orders Placed This Encounter   Procedures (TYLENOL) tablet 650 mg     acetaminophen (TYLENOL) suppository 650 mg    diphenhydrAMINE (BENADRYL) injection 25 mg    methylPREDNISolone sodium (SOLU-MEDROL) injection 40 mg     Initial MDM/Plan/ED COURSE:    37 y.o. female who presents with facial swelling and bilateral leg swelling. This has progressed over the course of today without known new exposures to food, medications, or other possible subspecialists. Patient has 1 prior episode of leg swelling with unknown cause. She does not take any daily medications. On exam, patient is in no acute distress and vitals are stable. Patient is saturating 96% on room air and does not have any obvious difficulty breathing. Airway is intact, oropharynx is clear and moist but does appear somewhat edematous. Right face is very swollen, more so than left. No cervical adenopathy. Heart and lung exam unremarkable. Abdomen is soft nontender. Patient also has bilateral lower extremity 2+ pitting edema. No calf tenderness. We will obtain labs, give Solu-Medrol, Benadryl, Pepcid for symptoms, and continue to monitor for any decline in airway competency. Anticipate admission for continued observation given acute swelling has not. Possibility for hereditary angioedema considered, but this would require further work-up. Will evaluate kidney function, give diuretics if able, and evaluate for sources of infection or other causes of swelling. Patient's labs are unremarkable and Lasix 40 was ordered. Patient to be admitted to [facility] for further observation and work-up.   She has remained stable here in the ED without worsening symptoms.      :     DIAGNOSTIC RESULTS / EMERGENCYDEPARTMENT COURSE / MDM     LABS:  Labs Reviewed   CBC WITH AUTO DIFFERENTIAL - Abnormal; Notable for the following components:       Result Value    Hemoglobin 8.8 (*)     Hematocrit 30.8 (*)     MCV 72.3 (*)     MCH 20.7 (*)     RDW 18.4 (*)     Seg Neutrophils 69 (*)     Lymphocytes 23 (*)     All other components within normal limits   COMPREHENSIVE METABOLIC PANEL W/ REFLEX TO MG FOR LOW K - Abnormal; Notable for the following components:    Glucose 104 (*)     Calcium 8.3 (*)     Total Bilirubin 0.17 (*)     All other components within normal limits   BRAIN NATRIURETIC PEPTIDE   HCG, SERUM, QUALITATIVE   TROPONIN   TROPONIN   URINALYSIS WITH MICROSCOPIC           XR CHEST PORTABLE    Result Date: 6/1/2021  EXAMINATION: ONE XRAY VIEW OF THE CHEST 6/1/2021 1:07 am COMPARISON: 04/05/2021 HISTORY: ORDERING SYSTEM PROVIDED HISTORY: swelling TECHNOLOGIST PROVIDED HISTORY: swelling Reason for Exam: facial and leg swelling  upright port FINDINGS: Heart size and pulmonary vasculature are normal.  The lungs are clear and normally expanded. Surrounding osseous and soft tissue structures are unremarkable. Normal examination. EKG  EKG Interpretation    Interpreted by emergency department physician    Rhythm: normal sinus   Rate: tachycardia  Axis: normal  Ectopy: none  Conduction: normal  ST Segments: no acute change  T Waves: inversion in  v4, v5 and v6  Q Waves: none    Clinical Impression: non-specific EKG, new T wave inversions compared to 10/19/20    Fern Kidd DO      All EKG's are interpreted by the Emergency Department Physicianwho either signs or Co-signs this chart in the absence of a cardiologist.      PROCEDURES:  None    CONSULTS:  IP CONSULT TO HOSPITALIST    CRITICAL CARE:  Please see attending note    FINAL IMPRESSION      1. Facial swelling          DISPOSITION / PLAN     DISPOSITION Admitted 06/01/2021 04:12:49 AM      PATIENT REFERRED TO:  No follow-up provider specified.     DISCHARGE MEDICATIONS:  New Prescriptions    No medications on file       Fern Kidd DO  Emergency Medicine Resident    (Please note that portions of this note were completed with a voice recognition program.Efforts were made to edit the dictations but occasionally words are

## 2021-06-01 NOTE — H&P
[facility]  Office: [address], DO, Estefani Howard, DO, Isha Barrera, DO, Bessy Ramirez Blood, DO, Thomas Bland MD, Shante Baca MD, Karly James MD, Tiffanie Pizano MD, Kristy Hoffmann MD, Chris Koch MD, Luis Luo MD, Nakia Coronado MD, Chitra Foster DO, Diandra De Leon MD, Yuval Bundy DO, Shelley Gonzalez MD,  Mary Carlos, DO, Nedra Pablo MD, Jason Zavala MD, Kathleen Lindsey MD, Danielle Toledo MD, [facility], [facility], [address], [facility], Elliott Muro, CNP, Timur Voss, CNS, Curt Thakkar, CNP, Regan King, CNP, Bradly Vegas, CNP, Sebastian Foster, CNP, Maikel Alba, CNP, Dorinda Chapin PA-C, Kanwal Lara, [facility], [place]), Geraldine Guzman, CNP, Yinka Prabhakar, CNP, Terrell Tierney, CNP, Nicolle Vega, CNP, Danielle Mejia, CNP, Leeann Keaneo, [id number] [place] [address]      HISTORY AND PHYSICAL EXAMINATION            Date:   6/1/2021  Patient name:  Khanh Iqbal  Date of admission:  6/1/2021 12:23 AM  MRN:   [id number]  Account:  [de-identified]  YOB: 1985  PCP:    Pascale Brown MD  Room:   21/21  Code Status:    Full Code    Chief Complaint:     Chief Complaint   Patient presents with    Facial Swelling    Leg Swelling       History Obtained From:     Patient, Electronic medical record    History of Present Illness: The patient is a 37 y.o. / female who presents with Facial Swelling and Leg Swelling   and she is admitted to the hospital for the management of  Swelling of right side of face. Patient came to ER with swelling on R side face . It was sudden in onset and severe. Patient denies any throat swelling, difficulty breathing, rash , fever or chills. She did not report any new medications or change in diet. Patient denies any itching. She is eating and drinking OK without any difficulty. Patient has h/o heroine abuse and in on longterm methadone maintenance. She reports loud snoring at night.  Denies any dental pain .   Work up in ER was negative except for chronic anemia which she takes Iron and follows hematology as outpatient. She has sleep apnea study pending. Past Medical History:     Past Medical History:   Diagnosis Date    History of heroin use 9/16/2020        Past Surgical History:     History reviewed. No pertinent surgical history. Medications Prior to Admission:     Prior to Admission medications    Medication Sig Start Date End Date Taking? Authorizing Provider   predniSONE (DELTASONE) 20 MG tablet Take 2 tablets by mouth daily for 5 days 6/1/21 6/6/21 Yes Darien Stack MD   diphenhydrAMINE (BENADRYL ALLERGY) 25 MG capsule Take 1 capsule by mouth every 6 hours as needed for Allergies (swelling) 6/1/21 6/6/21 Yes Darien Stack MD   fluticasone (FLONASE) 50 MCG/ACT nasal spray 2 sprays by Nasal route daily 3/22/21   MIKE Jani CNP   albuterol sulfate HFA (PROAIR HFA) 108 (90 Base) MCG/ACT inhaler Inhale 2 puffs into the lungs every 4 hours as needed for Wheezing 3/22/21   MIKE Jain CNP   famotidine (PEPCID) 20 MG tablet TAKE 1 TABLET BY MOUTH 2 TIMES DAILY 2/10/21   Roberto Hernandez MD   bumetanide (BUMEX) 1 MG tablet Take 1 tablet by mouth 2 times daily 1/27/21   Roberto Hernandez MD   ferrous sulfate (IRON 325) 325 (65 Fe) MG tablet TAKE 1 TABLET BY MOUTH 3 TIMES DAILY (WITH MEALS) 1/13/21   Roberto Hernandez MD   Masks ([address] [facility]) [address] [facility] USE NEW FACE MASK EVERYDAY WHEN PATIENT GO OUTSIDE OF HOME DUE TO COVID PANDEMIC 12/9/20   Roberto Hernandez MD   sertraline (ZOLOFT) 50 MG tablet Take 1 tablet by mouth daily 9/2/20   Historical Provider, MD   QUETIAPINE FUMARATE PO Take 75 mg by mouth nightly  9/2/20   Historical Provider, MD   prazosin (MINIPRESS) 1 MG capsule Take 1 capsule by mouth nightly  9/2/20   Historical Provider, MD   methadone (DOLOPHINE) 10 MG tablet Take 120 mg by mouth daily.      Historical Provider, MD        Allergies:     Patient has no known allergies. Social History:     Tobacco:    reports that she has been smoking cigarettes. She has been smoking about 0.50 packs per day. She has never used smokeless tobacco.  Alcohol:      reports no history of alcohol use. Drug Use:  reports no history of drug use. Family History:     History reviewed. No pertinent family history. Review of Systems:     Positive and Negative as described in HPI. Review of Systems   Constitutional: Negative for appetite change, fatigue, fever and unexpected weight change. HENT: Positive for facial swelling. Negative for congestion, rhinorrhea and sneezing. Eyes: Negative for visual disturbance. Respiratory: Negative for cough, chest tightness, shortness of breath and wheezing. Cardiovascular: Negative for chest pain and palpitations. Gastrointestinal: Negative for abdominal pain, blood in stool, constipation, diarrhea, nausea and vomiting. Genitourinary: Negative for dysuria, enuresis, frequency and hematuria. Musculoskeletal: Negative for arthralgias and myalgias. Skin: Negative for rash. Neurological: Negative for dizziness, weakness, light-headedness and headaches. Hematological: Does not bruise/bleed easily. Psychiatric/Behavioral: Negative for dysphoric mood and sleep disturbance. Physical Exam:   BP (!) 136/95   Pulse 61   Temp 99.3 °F (37.4 °C)   Resp 16   Ht 5' 1\" (1.549 m)   Wt (!) 319 lb (144.7 kg)   SpO2 98%   BMI 60.27 kg/m²   Temp (24hrs), Av.3 °F (37.4 °C), Min:99.3 °F (37.4 °C), Max:99.3 °F (37.4 °C)    No results for input(s): POCGLU in the last 72 hours. No intake or output data in the 24 hours ending 21 0811  Physical Exam  Vitals and nursing note reviewed. Constitutional:       General: She is not in acute distress. Appearance: She is morbidly obese. She is not diaphoretic. HENT:      Head: Normocephalic and atraumatic.       Nose:      Right Sinus: No maxillary sinus tenderness or frontal sinus tenderness. Left Sinus: No maxillary sinus tenderness or frontal sinus tenderness. Mouth/Throat:      Dentition: Abnormal dentition. Pharynx: No oropharyngeal exudate. Comments: R molar caries and teeth damage . No focal tenderness or lymphadenopathy . Eyes:      General: No scleral icterus. Conjunctiva/sclera: Conjunctivae normal.      Pupils: Pupils are equal, round, and reactive to light. Neck:      Thyroid: No thyromegaly. Vascular: No JVD. Cardiovascular:      Rate and Rhythm: Normal rate and regular rhythm. Pulses:           Dorsalis pedis pulses are 2+ on the right side and 2+ on the left side. Heart sounds: Normal heart sounds. No murmur heard. Comments: Lower ext lymphedema   Pulmonary:      Effort: Pulmonary effort is normal.      Breath sounds: Normal breath sounds. No wheezing or rales. Abdominal:      Palpations: Abdomen is soft. There is no mass. Tenderness: There is no abdominal tenderness. Musculoskeletal:      Cervical back: Full passive range of motion without pain and neck supple. Lymphadenopathy:      Head:      Right side of head: No submandibular adenopathy. Left side of head: No submandibular adenopathy. Cervical: No cervical adenopathy. Skin:     General: Skin is warm. Neurological:      Mental Status: She is alert and oriented to person, place, and time. Motor: No tremor. Psychiatric:         Behavior: Behavior is cooperative.          Investigations:      Laboratory Testing:  Recent Results (from the past 24 hour(s))   CBC Auto Differential    Collection Time: 06/01/21  1:08 AM   Result Value Ref Range    WBC 7.9 3.5 - 11.3 k/uL    RBC 4.26 3.95 - 5.11 m/uL    Hemoglobin 8.8 (L) 11.9 - 15.1 g/dL    Hematocrit 30.8 (L) 36.3 - 47.1 %    MCV 72.3 (L) 82.6 - 102.9 fL    MCH 20.7 (L) 25.2 - 33.5 pg    MCHC 28.6 28.4 - 34.8 g/dL    RDW 18.4 (H) 11.8 - 14.4 %    Platelets 956 643 - 179 k/uL    MPV 10.0 8.1 - 13.5 fL NRBC Automated 0.0 0.0 per 100 WBC    Differential Type NOT REPORTED     Seg Neutrophils 69 (H) 36 - 65 %    Lymphocytes 23 (L) 24 - 43 %    Monocytes 6 3 - 12 %    Eosinophils % 2 1 - 4 %    Basophils 0 0 - 2 %    Immature Granulocytes 0 0 %    Segs Absolute 5.46 1.50 - 8.10 k/uL    Absolute Lymph # 1.80 1.10 - 3.70 k/uL    Absolute Mono # 0.43 0.10 - 1.20 k/uL    Absolute Eos # 0.14 0.00 - 0.44 k/uL    Basophils Absolute <0.03 0.00 - 0.20 k/uL    Absolute Immature Granulocyte <0.03 0.00 - 0.30 k/uL    WBC Morphology NOT REPORTED     RBC Morphology ANISOCYTOSIS PRESENT     Platelet Estimate NOT REPORTED    Comprehensive Metabolic Panel w/ Reflex to MG    Collection Time: 06/01/21  1:08 AM   Result Value Ref Range    Glucose 104 (H) 70 - 99 mg/dL    BUN 10 6 - 20 mg/dL    CREATININE 0.73 0.50 - 0.90 mg/dL    Bun/Cre Ratio NOT REPORTED 9 - 20    Calcium 8.3 (L) 8.6 - 10.4 mg/dL    Sodium 135 135 - 144 mmol/L    Potassium 4.4 3.7 - 5.3 mmol/L    Chloride 102 98 - 107 mmol/L    CO2 23 20 - 31 mmol/L    Anion Gap 10 9 - 17 mmol/L    Alkaline Phosphatase 100 35 - 104 U/L    ALT 19 5 - 33 U/L    AST 20 <32 U/L    Total Bilirubin 0.17 (L) 0.3 - 1.2 mg/dL    Total Protein 7.1 6.4 - 8.3 g/dL    Albumin 3.6 3.5 - 5.2 g/dL    Albumin/Globulin Ratio 1.0 1.0 - 2.5    GFR Non-African American >60 >60 mL/min    GFR African American >60 >60 mL/min    GFR Comment          GFR Staging NOT REPORTED    Brain Natriuretic Peptide    Collection Time: 06/01/21  1:08 AM   Result Value Ref Range    Pro-BNP 25 <300 pg/mL    BNP Interpretation Pro-BNP Reference Range:    HCG Qualitative, Serum    Collection Time: 06/01/21  1:08 AM   Result Value Ref Range    hCG Qual NEGATIVE NEGATIVE   Troponin    Collection Time: 06/01/21  1:08 AM   Result Value Ref Range    Troponin, High Sensitivity <6 0 - 14 ng/L    Troponin T NOT REPORTED <0.03 ng/mL    Troponin Interp NOT REPORTED    Troponin    Collection Time: 06/01/21  2:42 AM   Result Value Ref Range    Troponin, High Sensitivity <6 0 - 14 ng/L    Troponin T NOT REPORTED <0.03 ng/mL    Troponin Interp NOT REPORTED        Imaging/Diagonstics:    XR CHEST PORTABLE    Result Date: 6/1/2021  Normal examination. Assessment :      Primary Problem  Swelling of right side of face    Active Hospital Problems    Diagnosis Date Noted    Facial swelling [R22.0] 06/01/2021    Swelling of right side of face [R22.0] 06/01/2021    Iron deficiency anemia secondary to inadequate dietary iron intake [D50.8] 10/30/2020    Opioid use, unspecified with other opioid-induced disorder [facility]) [F11.988] 09/15/2020       Plan:     Patient status Admit as observation in the  Med/Surge    1. Facial swelling R side. Improved with solumedrol in ER. breathing normal and 100% SPO2 and eating without difficulty . Will prescribe prednisone and benadryl for 5 days   2. Morbid obesity - life style changes   3. Snoring - follow sleep apnea study as scheduled. 4. Dental caries - dentist follow up outpatient   5. lymphedema - on bumex   6. Heroine addiction on methadone maintenance -   7. chronic anemia - on iron therapy     Consultations:   IP CONSULT TO HOSPITALIST    Patient is admitted as observation status because of co-morbidities listed above, severity of signs and symptoms as outlined, requirement for current medical therapies and most importantly because of direct risk to patient if care not provided in a hospital setting.     Carlos Alberto Paula MD  6/1/2021    Copy sent to Dr. Agatha Orr MD    (Please note that portions of this note were completed with a voice recognition program. Efforts were made to edit the dictations but occasionally words are mis-transcribed.)

## 2021-06-01 NOTE — DISCHARGE SUMMARY
AST 20 06/01/2021    ALKPHOS 100 06/01/2021    BILITOT 0.17 (L) 06/01/2021     Lab Results   Component Value Date    TSH 1.55 02/10/2021     Lab Results   Component Value Date    HEPAIGM NONREACTIVE 11/18/2013    HEPBIGM NONREACTIVE 11/18/2013    HEPCAB NONREACTIVE 11/18/2013     Lab Results   Component Value Date    COLORU YELLOW 10/20/2020    NITRU NEGATIVE 10/20/2020    GLUCOSEU NEGATIVE 10/20/2020    KETUA NEGATIVE 10/20/2020    UROBILINOGEN Normal 10/20/2020    BILIRUBINUR NEGATIVE 10/20/2020     No results found for: LABA1C  No results found for: EAG  Lab Results   Component Value Date    INR 1.0 10/20/2020    PROTIME 13.3 10/20/2020       XR CHEST PORTABLE    Result Date: 6/1/2021  Normal examination. Consultations:    Consults:     Final Specialist Recommendations/Findings:   IP CONSULT TO HOSPITALIST      The patient was seen and examined on day of discharge and this discharge summary is in conjunction with any daily progress note from day of discharge. Discharge plan:     Disposition: Home    Physician Follow Up:     Angela Gaytan, [address] [place] [address]  [phone]    Schedule an appointment as soon as possible for a visit in 3 days  ED follow up        Requiring Further Evaluation/Follow Up POST HOSPITALIZATION/Incidental Findings: Follow-up with PCP in 3 to 5 days. Return to ED if symptoms worsen . Diet: low fat, low cholesterol diet    Activity: As tolerated. Instructions to Patient: Medication as advised. Return to ED if symptoms worsen.     Discharge Medications:      Medication List      START taking these medications    diphenhydrAMINE 25 MG capsule  Commonly known as: Benadryl Allergy  Take 1 capsule by mouth every 6 hours as needed for Allergies (swelling)     predniSONE 20 MG tablet  Commonly known as: DELTASONE  Take 2 tablets by mouth daily for 5 days        CONTINUE taking these medications    albuterol sulfate  (90 Base) MCG/ACT inhaler  Commonly known as: ProAir HFA  Inhale 2 puffs into the lungs every 4 hours as needed for Wheezing     bumetanide 1 MG tablet  Commonly known as: BUMEX  Take 1 tablet by mouth 2 times daily     [place] Choice Face Mask Misc  USE NEW FACE MASK EVERYDAY WHEN PATIENT GO OUTSIDE OF HOME DUE TO COVID PANDEMIC     famotidine 20 MG tablet  Commonly known as: PEPCID  TAKE 1 TABLET BY MOUTH 2 TIMES DAILY     ferrous sulfate 325 (65 Fe) MG tablet  Commonly known as: IRON 325  TAKE 1 TABLET BY MOUTH 3 TIMES DAILY (WITH MEALS)     fluticasone 50 MCG/ACT nasal spray  Commonly known as: Flonase  2 sprays by Nasal route daily     methadone 10 MG tablet  Commonly known as: DOLOPHINE     prazosin 1 MG capsule  Commonly known as: MINIPRESS     QUETIAPINE FUMARATE PO     sertraline 50 MG tablet  Commonly known as: ZOLOFT        STOP taking these medications    ibuprofen 600 MG tablet  Commonly known as: ADVIL;MOTRIN           Where to Get Your Medications      These medications were sent to [facility] [address], [address] [place] [address].    Phone: [phone]   · diphenhydrAMINE 25 MG capsule  · predniSONE 20 MG tablet         Time Spent on discharge is  20 mins in patient examination, evaluation, counseling as well as medication reconciliation, prescriptions for required medications, discharge plan and follow up. Electronically signed by   Ubaldo Lizarraga MD  6/1/2021        Thank you Dr. Judge Samina MD for the opportunity to be involved in this patient's care.

## 2021-06-02 RX ORDER — FAMOTIDINE 20 MG/1
20 TABLET, FILM COATED ORAL 2 TIMES DAILY
Qty: 60 TABLET | Refills: 3 | Status: SHIPPED | OUTPATIENT
Start: 2021-06-02 | End: 2021-08-25

## 2021-08-25 RX ORDER — FAMOTIDINE 20 MG/1
20 TABLET, FILM COATED ORAL 2 TIMES DAILY
Qty: 60 TABLET | Refills: 3 | Status: SHIPPED | OUTPATIENT
Start: 2021-08-25 | End: 2021-12-15

## 2021-11-16 ENCOUNTER — OFFICE VISIT (OUTPATIENT)
Dept: INTERNAL MEDICINE CLINIC | Age: 36
End: 2021-11-16
Payer: MEDICAID

## 2021-11-16 VITALS
OXYGEN SATURATION: 97 % | HEIGHT: 61 IN | WEIGHT: 293 LBS | BODY MASS INDEX: 55.32 KG/M2 | SYSTOLIC BLOOD PRESSURE: 136 MMHG | DIASTOLIC BLOOD PRESSURE: 88 MMHG | HEART RATE: 92 BPM

## 2021-11-16 DIAGNOSIS — Z13.1 DIABETES MELLITUS SCREENING: ICD-10-CM

## 2021-11-16 DIAGNOSIS — F33.42 RECURRENT MAJOR DEPRESSIVE DISORDER, IN FULL REMISSION (HCC): ICD-10-CM

## 2021-11-16 DIAGNOSIS — L30.4 INTERTRIGO: ICD-10-CM

## 2021-11-16 DIAGNOSIS — E66.01 MORBID OBESITY WITH BMI OF 60.0-69.9, ADULT (HCC): Primary | ICD-10-CM

## 2021-11-16 PROCEDURE — 99214 OFFICE O/P EST MOD 30 MIN: CPT | Performed by: INTERNAL MEDICINE

## 2021-11-16 PROCEDURE — 4004F PT TOBACCO SCREEN RCVD TLK: CPT | Performed by: INTERNAL MEDICINE

## 2021-11-16 PROCEDURE — G8484 FLU IMMUNIZE NO ADMIN: HCPCS | Performed by: INTERNAL MEDICINE

## 2021-11-16 PROCEDURE — G8427 DOCREV CUR MEDS BY ELIG CLIN: HCPCS | Performed by: INTERNAL MEDICINE

## 2021-11-16 PROCEDURE — G8417 CALC BMI ABV UP PARAM F/U: HCPCS | Performed by: INTERNAL MEDICINE

## 2021-11-16 RX ORDER — KETOCONAZOLE 20 MG/G
1 CREAM TOPICAL 2 TIMES DAILY
Qty: 60 G | Refills: 3 | Status: SHIPPED | OUTPATIENT
Start: 2021-11-16 | End: 2022-04-06

## 2021-11-16 NOTE — PROGRESS NOTES
Visit Information    Have you changed or started any medications since your last visit including any over-the-counter medicines, vitamins, or herbal medicines? no   Are you having any side effects from any of your medications? -  no  Have you stopped taking any of your medications? Is so, why? -  no    Have you seen any other physician or provider since your last visit? No  Have you had any other diagnostic tests since your last visit? No  Have you been seen in the emergency room and/or had an admission to a hospital since we last saw you? No  Have you had your routine dental cleaning in the past 6 months? no    Have you activated your [employer] account? If not, what are your barriers?  No:      Patient Care Team:  Rayburn Canavan, MD as PCP - General (Internal Medicine)  [facility]burn Canavan, [facility]    Medical History Review  Past Medical, Family, and Social History reviewed and does contribute to the patient presenting condition    Health Maintenance   Topic Date Due    Varicella vaccine (1 of 2 - 2-dose childhood series) Never done    DTaP/Tdap/Td vaccine (1 - Tdap) Never done    Cervical cancer screen  Never done    Potassium monitoring  06/01/2022    Creatinine monitoring  06/01/2022    Pneumococcal 0-64 years Vaccine (2 of 2 - PPSV23) 06/22/2052    Flu vaccine  Completed    COVID-19 Vaccine  Completed    Hepatitis C screen  Completed    HIV screen  Completed    Hepatitis A vaccine  Aged Out    Hepatitis B vaccine  Aged Out    Hib vaccine  Aged Out    Meningococcal (ACWY) vaccine  Aged Out     SUBJECTIVE:  Corona Ac is a 31 y.o. female patient who  comes for complaints of   Chief Complaint   Patient presents with    Weight Management    Rash     under breast        rash  Duration- 2mth  Worsening   Severity-mild  Context- morbid obesity  Associated signs and symptoms- cracking, burning, painful, foul odor  Modifying factors- talcum powder doesn't help, steroid cream OTCdid not hel    Weight management  BMI 60  Try jonathon  Has been [facility] with , last 3mths  Changed diet, saw dietician as well  Follow up next mth  No personal or f/hx of thyroid cancers- discussed with patient        REVIEW OF SYSTEMS (except Subjective (HPI))  GENERAL: No fevers / chills  RESPIRATORY: Negative for cough, wheezing or shortness of breath  CARDIOVASCULAR: Negative for chest pain or palpitations. GI: no nausea, vomiting, or diarrhea  NEURO: No history of headaches    Past Medical History:   Diagnosis Date    History of heroin use 9/16/2020       SOCIAL HISTORY:  Social History     Socioeconomic History    Marital status: Single     Spouse name: Not on file    Number of children: Not on file    Years of education: Not on file    Highest education level: Not on file   Occupational History    Not on file   Tobacco Use    Smoking status: Current Every Day Smoker     Packs/day: 0.50     Types: Cigarettes    Smokeless tobacco: Never Used   Vaping Use    Vaping Use: Never used   Substance and Sexual Activity    Alcohol use: No    Drug use: No     Comment: on Methadone    Sexual activity: Not on file   Other Topics Concern    Not on file   Social History Narrative    Not on file     Social Determinants of Health     Financial Resource Strain:     Difficulty of Paying Living Expenses: Not on file   Food Insecurity:     Worried About [employer] in the Last Year: Not on file    Stephanie of Food in the Last Year: Not on file   Transportation Needs:     Lack of Transportation (Medical): Not on file    Lack of Transportation (Non-Medical):  Not on file   Physical Activity:     Days of Exercise per Week: Not on file    Minutes of Exercise per Session: Not on file   Stress:     Feeling of Stress : Not on file   Social Connections:     Frequency of Communication with Friends and Family: Not on file    Frequency of Social Gatherings with Friends and Family: Not on file  Attends Jew Services: Not on file    Active Member of Clubs or Organizations: Not on file    Attends Club or Organization Meetings: Not on file    Marital Status: Not on file   Intimate Partner Violence:     Fear of Current or Ex-Partner: Not on file    Emotionally Abused: Not on file    Physically Abused: Not on file    Sexually Abused: Not on file   Housing Stability:     Unable to Pay for Housing in the Last Year: Not on file    Number of [place] in the Last Year: Not on file    Unstable Housing in the Last Year: Not on file           CURRENT MEDICATIONS:  Current Outpatient Medications   Medication Sig Dispense Refill    REXULTI 0.5 MG TABS tablet ONE (EACH) TABLET BY MOUTH DAILY;      sertraline (ZOLOFT) 100 MG tablet 1 (EACH) DAILY IN THE MORNING;      famotidine (PEPCID) 20 MG tablet TAKE 1 TABLET BY MOUTH 2 TIMES DAILY 60 tablet 3    fluticasone (FLONASE) 50 MCG/ACT nasal spray 2 sprays by Nasal route daily 1 Bottle 0    albuterol sulfate HFA (PROAIR HFA) 108 (90 Base) MCG/ACT inhaler Inhale 2 puffs into the lungs every 4 hours as needed for Wheezing 1 Inhaler 3    bumetanide (BUMEX) 1 MG tablet Take 1 tablet by mouth 2 times daily 30 tablet 11    ferrous sulfate (IRON 325) 325 (65 Fe) MG tablet TAKE 1 TABLET BY MOUTH 3 TIMES DAILY (WITH MEALS) 90 tablet 2    Masks (CLEVER CHOICE FACE MASK) MISC USE NEW FACE MASK EVERYDAY WHEN PATIENT GO OUTSIDE OF HOME DUE TO COVID PANDEMIC 90 each 3    QUETIAPINE FUMARATE PO Take 75 mg by mouth nightly       prazosin (MINIPRESS) 1 MG capsule Take 1 capsule by mouth nightly       methadone (DOLOPHINE) 10 MG tablet Take 120 mg by mouth daily.  sertraline (ZOLOFT) 25 MG tablet 1 (EACH) DAILY; TAKE WITH 100MG DOSE TO EQUAL 125MG TOTAL.  (Patient not taking: Reported on 11/16/2021)      sertraline (ZOLOFT) 50 MG tablet Take 1 tablet by mouth daily (Patient not taking: Reported on 11/16/2021)       No current facility-administered medications for this visit. OBJECTIVE:  Vitals:    11/16/21 1337   BP: 136/88   Pulse: 92   SpO2: 97%     Body mass index is 60.27 kg/m². Focal exam:  Intertrigo under both breasts  No oozing, some cracking of skin      General exam (except above):  General appearance - well appearing, alert, in no acute distress  Head - Atraumatic, normocephalic  Eyes - EOMI, no jaundice or pallor  Lungs - Lungs clear to auscultation. No wheezing, rhonchi, rales  Heart - RRR without murmur, gallop, or rubs. No ectopy  Abdomen - Abdomen soft, non-tender. Bowel sounds normal. No masses, organomegaly  Extremities -No significant edema, or skin discoloration. Good capillary refill. Neuro - Pt Alert, awake and oriented x 3. No gross focal neurological deficits    ASSESSMENT AND PLAN (MEDICAL DECISION MAKING):   Kimberly Gleason was seen today for weight management and rash. Diagnoses and all orders for this visit:    Morbid obesity with BMI of 60.0-69.9, adult ([facility]  -     Semaglutide-Weight Management (WEGOVY) 0.25 MG/0.5ML SOAJ SC injection; Inject 0.25 mg into the skin every 7 days    Recurrent major depressive disorder, in full remission ([facility]    Diabetes mellitus screening  -     Hemoglobin A1C; Future    Intertrigo  -     ketoconazole (NIZORAL) 2 % cream; Apply 1 Applicatorful topically 2 times daily Apply topically BID for 2-4 weeks    Other orders  -     betamethasone valerate (VALISONE) 0.1 % cream; Apply topically 2 times daily.            Follow up in: Joan Bullock MD

## 2021-11-17 ENCOUNTER — TELEPHONE (OUTPATIENT)
Dept: INTERNAL MEDICINE CLINIC | Age: 36
End: 2021-11-17

## 2021-11-17 DIAGNOSIS — E66.01 MORBID OBESITY WITH BMI OF 60.0-69.9, ADULT (HCC): Primary | ICD-10-CM

## 2021-11-17 NOTE — TELEPHONE ENCOUNTER
Pt called & stated that he ins will not cover   Semaglutide-Weight Management (WEGOVY) 0.25 MG/0.5ML SOAJ SC injection and will need a prior auth. Please start prior auth if not already started.

## 2021-11-19 NOTE — TELEPHONE ENCOUNTER
I had advised patient to contact insurance and find out if they will cover any other medication in the same class , e.g Victoza.   Please check if patient has done that.  if nothing else covered I will prescribe alternative  Sonia Agarwal MD

## 2021-11-19 NOTE — TELEPHONE ENCOUNTER
Patient called insurance to see what they would cover and insurance stated that they do not cover any weight loss medication that she would have to pay out of pocket. She wants to know if you can prescribe Adipex she said she can afford that .

## 2021-11-22 RX ORDER — PHENTERMINE HYDROCHLORIDE 37.5 MG/1
37.5 TABLET ORAL
Qty: 30 TABLET | Refills: 0 | Status: SHIPPED | OUTPATIENT
Start: 2021-11-22 | End: 2021-12-17 | Stop reason: SDUPTHER

## 2021-12-07 ENCOUNTER — APPOINTMENT (OUTPATIENT)
Dept: CT IMAGING | Age: 36
End: 2021-12-07
Payer: MEDICAID

## 2021-12-07 ENCOUNTER — HOSPITAL ENCOUNTER (EMERGENCY)
Age: 36
Discharge: HOME | End: 2021-12-08
Attending: EMERGENCY MEDICINE
Payer: MEDICAID

## 2021-12-07 VITALS
HEART RATE: 102 BPM | SYSTOLIC BLOOD PRESSURE: 157 MMHG | WEIGHT: 293 LBS | HEIGHT: 61 IN | TEMPERATURE: 98.7 F | DIASTOLIC BLOOD PRESSURE: 72 MMHG | OXYGEN SATURATION: 100 % | RESPIRATION RATE: 16 BRPM | BODY MASS INDEX: 55.32 KG/M2

## 2021-12-07 DIAGNOSIS — L03.211 FACIAL CELLULITIS: Primary | ICD-10-CM

## 2021-12-07 LAB
ALBUMIN SERPL-MCNC: 3.6 G/DL (ref 3.5–5.2)
ALBUMIN/GLOBULIN RATIO: ABNORMAL (ref 1–2.5)
ALP BLD-CCNC: 123 U/L (ref 35–104)
ALT SERPL-CCNC: 22 U/L (ref 5–33)
ANION GAP SERPL CALCULATED.3IONS-SCNC: 7 MMOL/L (ref 9–17)
AST SERPL-CCNC: 15 U/L
BILIRUB SERPL-MCNC: 0.19 MG/DL (ref 0.3–1.2)
BUN BLDV-MCNC: 8 MG/DL (ref 6–20)
BUN/CREAT BLD: ABNORMAL (ref 9–20)
CALCIUM SERPL-MCNC: 8.4 MG/DL (ref 8.6–10.4)
CHLORIDE BLD-SCNC: 102 MMOL/L (ref 98–107)
CO2: 24 MMOL/L (ref 20–31)
CREAT SERPL-MCNC: 0.71 MG/DL (ref 0.5–0.9)
GFR AFRICAN AMERICAN: >60 ML/MIN
GFR NON-AFRICAN AMERICAN: >60 ML/MIN
GFR SERPL CREATININE-BSD FRML MDRD: ABNORMAL ML/MIN/{1.73_M2}
GFR SERPL CREATININE-BSD FRML MDRD: ABNORMAL ML/MIN/{1.73_M2}
GLUCOSE BLD-MCNC: 121 MG/DL (ref 70–99)
HCG QUALITATIVE: NEGATIVE
LACTIC ACID, SEPSIS WHOLE BLOOD: NORMAL MMOL/L (ref 0.5–1.9)
LACTIC ACID, SEPSIS: 1 MMOL/L (ref 0.5–1.9)
POTASSIUM SERPL-SCNC: 3.9 MMOL/L (ref 3.7–5.3)
SODIUM BLD-SCNC: 133 MMOL/L (ref 135–144)
TOTAL PROTEIN: 6.9 G/DL (ref 6.4–8.3)

## 2021-12-07 PROCEDURE — 96375 TX/PRO/DX INJ NEW DRUG ADDON: CPT

## 2021-12-07 PROCEDURE — 84703 CHORIONIC GONADOTROPIN ASSAY: CPT

## 2021-12-07 PROCEDURE — 87040 BLOOD CULTURE FOR BACTERIA: CPT

## 2021-12-07 PROCEDURE — 83605 ASSAY OF LACTIC ACID: CPT

## 2021-12-07 PROCEDURE — 99284 EMERGENCY DEPT VISIT MOD MDM: CPT

## 2021-12-07 PROCEDURE — 2580000003 HC RX 258: Performed by: EMERGENCY MEDICINE

## 2021-12-07 PROCEDURE — 85025 COMPLETE CBC W/AUTO DIFF WBC: CPT

## 2021-12-07 PROCEDURE — 6360000002 HC RX W HCPCS: Performed by: EMERGENCY MEDICINE

## 2021-12-07 PROCEDURE — 80053 COMPREHEN METABOLIC PANEL: CPT

## 2021-12-07 PROCEDURE — 36415 COLL VENOUS BLD VENIPUNCTURE: CPT

## 2021-12-07 PROCEDURE — 96366 THER/PROPH/DIAG IV INF ADDON: CPT

## 2021-12-07 PROCEDURE — 2500000003 HC RX 250 WO HCPCS: Performed by: EMERGENCY MEDICINE

## 2021-12-07 PROCEDURE — 70487 CT MAXILLOFACIAL W/DYE: CPT

## 2021-12-07 PROCEDURE — 6360000004 HC RX CONTRAST MEDICATION: Performed by: EMERGENCY MEDICINE

## 2021-12-07 PROCEDURE — 96365 THER/PROPH/DIAG IV INF INIT: CPT

## 2021-12-07 RX ORDER — CLINDAMYCIN PHOSPHATE 600 MG/50ML
600 INJECTION INTRAVENOUS ONCE
Status: COMPLETED | OUTPATIENT
Start: 2021-12-07 | End: 2021-12-08

## 2021-12-07 RX ORDER — KETOROLAC TROMETHAMINE 30 MG/ML
30 INJECTION, SOLUTION INTRAMUSCULAR; INTRAVENOUS ONCE
Status: COMPLETED | OUTPATIENT
Start: 2021-12-07 | End: 2021-12-07

## 2021-12-07 RX ORDER — SODIUM CHLORIDE 0.9 % (FLUSH) 0.9 %
10 SYRINGE (ML) INJECTION PRN
Status: DISCONTINUED | OUTPATIENT
Start: 2021-12-07 | End: 2021-12-08 | Stop reason: HOSPADM

## 2021-12-07 RX ORDER — 0.9 % SODIUM CHLORIDE 0.9 %
1000 INTRAVENOUS SOLUTION INTRAVENOUS ONCE
Status: COMPLETED | OUTPATIENT
Start: 2021-12-07 | End: 2021-12-08

## 2021-12-07 RX ORDER — 0.9 % SODIUM CHLORIDE 0.9 %
80 INTRAVENOUS SOLUTION INTRAVENOUS ONCE
Status: COMPLETED | OUTPATIENT
Start: 2021-12-07 | End: 2021-12-07

## 2021-12-07 RX ADMIN — SODIUM CHLORIDE, PRESERVATIVE FREE 10 ML: 5 INJECTION INTRAVENOUS at 23:44

## 2021-12-07 RX ADMIN — CLINDAMYCIN PHOSPHATE 600 MG: 600 INJECTION, SOLUTION INTRAVENOUS at 22:55

## 2021-12-07 RX ADMIN — KETOROLAC TROMETHAMINE 30 MG: 30 INJECTION, SOLUTION INTRAMUSCULAR; INTRAVENOUS at 22:56

## 2021-12-07 RX ADMIN — SODIUM CHLORIDE 1000 ML: 9 INJECTION, SOLUTION INTRAVENOUS at 22:53

## 2021-12-07 RX ADMIN — IOPAMIDOL 75 ML: 755 INJECTION, SOLUTION INTRAVENOUS at 23:44

## 2021-12-07 RX ADMIN — SODIUM CHLORIDE 80 ML: 9 INJECTION, SOLUTION INTRAVENOUS at 23:44

## 2021-12-07 ASSESSMENT — PAIN SCALES - GENERAL
PAINLEVEL_OUTOF10: 7
PAINLEVEL_OUTOF10: 6

## 2021-12-07 ASSESSMENT — PAIN DESCRIPTION - PAIN TYPE: TYPE: ACUTE PAIN

## 2021-12-07 NOTE — Clinical Note
Dany Del Rio was seen and treated in our emergency department on 12/7/2021. She may return to work on 12/10/2021. If you have any questions or concerns, please don't hesitate to call.       Rebel Munroe MD

## 2021-12-08 LAB
ABSOLUTE EOS #: 0.15 K/UL (ref 0–0.4)
ABSOLUTE IMMATURE GRANULOCYTE: ABNORMAL K/UL (ref 0–0.3)
ABSOLUTE LYMPH #: 1.97 K/UL (ref 1–4.8)
ABSOLUTE MONO #: 0.44 K/UL (ref 0.1–1.3)
BASOPHILS # BLD: 1 % (ref 0–2)
BASOPHILS ABSOLUTE: 0.07 K/UL (ref 0–0.2)
DIFFERENTIAL TYPE: ABNORMAL
EOSINOPHILS RELATIVE PERCENT: 2 % (ref 0–4)
HCT VFR BLD CALC: 26.5 % (ref 36–46)
HEMOGLOBIN: 8.1 G/DL (ref 12–16)
IMMATURE GRANULOCYTES: ABNORMAL %
LYMPHOCYTES # BLD: 27 % (ref 24–44)
MCH RBC QN AUTO: 19.7 PG (ref 26–34)
MCHC RBC AUTO-ENTMCNC: 30.5 G/DL (ref 31–37)
MCV RBC AUTO: 64.5 FL (ref 80–100)
MONOCYTES # BLD: 6 % (ref 1–7)
MORPHOLOGY: ABNORMAL
NRBC AUTOMATED: ABNORMAL PER 100 WBC
PDW BLD-RTO: 20 % (ref 11.5–14.9)
PLATELET # BLD: 338 K/UL (ref 150–450)
PLATELET ESTIMATE: ABNORMAL
PMV BLD AUTO: 8.4 FL (ref 6–12)
RBC # BLD: 4.1 M/UL (ref 4–5.2)
RBC # BLD: ABNORMAL 10*6/UL
SEG NEUTROPHILS: 64 % (ref 36–66)
SEGMENTED NEUTROPHILS ABSOLUTE COUNT: 4.67 K/UL (ref 1.3–9.1)
WBC # BLD: 7.3 K/UL (ref 3.5–11)
WBC # BLD: ABNORMAL 10*3/UL

## 2021-12-08 PROCEDURE — 6360000002 HC RX W HCPCS: Performed by: EMERGENCY MEDICINE

## 2021-12-08 RX ORDER — CLINDAMYCIN HYDROCHLORIDE 300 MG/1
300 CAPSULE ORAL 2 TIMES DAILY
Qty: 14 CAPSULE | Refills: 0 | Status: SHIPPED | OUTPATIENT
Start: 2021-12-08 | End: 2021-12-15

## 2021-12-08 RX ORDER — METHYLPREDNISOLONE SODIUM SUCCINATE 125 MG/2ML
125 INJECTION, POWDER, LYOPHILIZED, FOR SOLUTION INTRAMUSCULAR; INTRAVENOUS ONCE
Status: COMPLETED | OUTPATIENT
Start: 2021-12-08 | End: 2021-12-08

## 2021-12-08 RX ADMIN — METHYLPREDNISOLONE SODIUM SUCCINATE 125 MG: 125 INJECTION, POWDER, FOR SOLUTION INTRAMUSCULAR; INTRAVENOUS at 01:56

## 2021-12-08 ASSESSMENT — ENCOUNTER SYMPTOMS
SORE THROAT: 0
STRIDOR: 0
EYE REDNESS: 0
ABDOMINAL PAIN: 0
COUGH: 0
FACIAL SWELLING: 1
NAUSEA: 0
EYE PAIN: 0
WHEEZING: 0
CONSTIPATION: 0
DIARRHEA: 0
VOMITING: 0
SHORTNESS OF BREATH: 0
EYE DISCHARGE: 0
COLOR CHANGE: 0

## 2021-12-08 NOTE — ED PROVIDER NOTES
[address]  EMERGENCY DEPARTMENT ENCOUNTER      Pt Name: Yong Jimenez  MRN: [id number]  [place] 1985  Date of evaluation: 12/7/2021  Provider: Jesus Alvarez MD    [address] [place] [address]       Chief Complaint   Patient presents with    Facial Swelling       CRITICAL CARE TIME   Total Critical Care time was 15 minutes, excluding separately reportable procedures. There was a high probability of clinically significant/life threatening deterioration in the patient's condition which required my urgent intervention. HISTORY OF PRESENT ILLNESS  (Location/Symptom, Timing/Onset, Context/Setting, Quality, Duration, Modifying Factors, Severity.)   Yong Jimenez is a 31 y.o. female who presents to the emergency department complaining of right-sided facial swelling that started this morning. She relates she is not having any tooth pain. She has not noticed any drainage. It seems to be in the cheek itself and mainly into the corner of the eye on that side that it is hurting. She relates she is currently on Suboxone. She is never had anything quite like this before. No fever that she is aware of. No nausea. Nursing Notes were reviewed. REVIEW OF SYSTEMS    (2-9 systems for level 4, 10 or more for level 5)     Review of Systems   Constitutional: Negative for activity change, appetite change, chills, fatigue and fever. HENT: Positive for facial swelling. Negative for congestion, ear pain and sore throat. Eyes: Negative for pain, discharge and redness. Respiratory: Negative for cough, shortness of breath, wheezing and stridor. Cardiovascular: Negative for chest pain. Gastrointestinal: Negative for abdominal pain, constipation, diarrhea, nausea and vomiting. Genitourinary: Negative for decreased urine volume and difficulty urinating. Musculoskeletal: Negative for arthralgias and myalgias. Skin: Negative for color change and rash.    Neurological: Negative for dizziness, weakness and headaches. Psychiatric/Behavioral: Negative for behavioral problems and confusion. Except as noted above the remainder of the review of systems was reviewed and negative. PAST MEDICAL HISTORY     Past Medical History:   Diagnosis Date    History of heroin use 9/16/2020       SURGICAL HISTORY       Past Surgical History:   Procedure Laterality Date    TUBAL LIGATION         CURRENT MEDICATIONS       Discharge Medication List as of 12/8/2021 12:57 AM      CONTINUE these medications which have NOT CHANGED    Details   phentermine (ADIPEX-P) 37.5 MG tablet Take 1 tablet by mouth every morning (before breakfast) for 30 days. , Disp-30 tablet, R-0Normal      Semaglutide-Weight Management (WEGOVY) 0.25 MG/0.5ML SOAJ SC injection Inject 0.25 mg into the skin every 7 days, Disp-2 mL, R-0Normal      ketoconazole (NIZORAL) 2 % cream Apply 1 Applicatorful topically 2 times daily Apply topically BID for 2-4 weeks, Topical, 2 TIMES DAILY Starting Tue 11/16/2021, Disp-60 g, R-3, Normal      REXULTI 0.5 MG TABS tablet ONE (EACH) TABLET BY MOUTH DAILY;, DAWHistorical Med      sertraline (ZOLOFT) 100 MG tablet 1 (EACH) DAILY IN THE MORNING; Historical Med      famotidine (PEPCID) 20 MG tablet TAKE 1 TABLET BY MOUTH 2 TIMES DAILY, Disp-60 tablet, R-3Normal      fluticasone (FLONASE) 50 MCG/ACT nasal spray 2 sprays by Nasal route daily, Disp-1 Bottle, R-0Normal      albuterol sulfate HFA (PROAIR HFA) 108 (90 Base) MCG/ACT inhaler Inhale 2 puffs into the lungs every 4 hours as needed for Wheezing, Disp-1 Inhaler, R-3Normal      bumetanide (BUMEX) 1 MG tablet Take 1 tablet by mouth 2 times daily, Disp-30 tablet, R-11Normal      ferrous sulfate (IRON 325) 325 (65 Fe) MG tablet TAKE 1 TABLET BY MOUTH 3 TIMES DAILY (WITH MEALS), Disp-90 tablet, R-2Normal      Masks (CLEVER CHOICE FACE MASK) MISC USE NEW FACE MASK EVERYDAY WHEN PATIENT GO OUTSIDE OF HOME DUE TO COVID PANDEMIC, Disp-90 each, R-3Normal      QUETIAPINE FUMARATE PO Take 75 mg by mouth nightly Historical Med      prazosin (MINIPRESS) 1 MG capsule Take 1 capsule by mouth nightly Historical Med      methadone (DOLOPHINE) 10 MG tablet Take 120 mg by mouth daily. Historical Med             ALLERGIES     Patient has no known allergies. FAMILY HISTORY     No family history on file. No family status information on file. SOCIAL HISTORY      reports that she has been smoking cigarettes. She has been smoking about 0.50 packs per day. She has never used smokeless tobacco. She reports that she does not drink alcohol and does not use drugs. PHYSICAL EXAM    (up to 7 for level 4, 8 or more for level 5)     ED Triage Vitals [12/07/21 2000]   BP Temp Temp Source Pulse Resp SpO2 Height Weight   (!) 157/72 98.7 °F (37.1 °C) Oral 102 16 100 % 5' 1\" (1.549 m) (!) 325 lb (147.4 kg)     Physical Exam  Vitals and nursing note reviewed. Constitutional:       General: She is not in acute distress. Appearance: She is well-developed. She is not ill-appearing, toxic-appearing or diaphoretic. HENT:      Head: Normocephalic and atraumatic. Comments: swelling and tenderness with mild erythema more notable in the eye corner     Right Ear: External ear normal.      Left Ear: External ear normal.      Nose: Nose normal.      Mouth/Throat:      Mouth: Mucous membranes are moist.   Eyes:      General:         Right eye: No discharge. Left eye: No discharge. Conjunctiva/sclera: Conjunctivae normal.      Pupils: Pupils are equal, round, and reactive to light. Cardiovascular:      Rate and Rhythm: Regular rhythm. Tachycardia present. Heart sounds: Normal heart sounds. No murmur heard. Pulmonary:      Effort: Pulmonary effort is normal. No respiratory distress. Breath sounds: Normal breath sounds. No wheezing or rales. Abdominal:      General: Bowel sounds are normal. There is no distension. Palpations: Abdomen is soft. There is no mass. Tenderness: There is no abdominal tenderness. There is no guarding or rebound. Musculoskeletal:         General: Normal range of motion. Cervical back: Normal range of motion and neck supple. Right lower leg: No edema. Left lower leg: No edema. Lymphadenopathy:      Cervical: No cervical adenopathy. Skin:     General: Skin is warm. Findings: No rash. Neurological:      General: No focal deficit present. Mental Status: She is alert and oriented to person, place, and time. Motor: No abnormal muscle tone. Psychiatric:         Mood and Affect: Mood normal.         Behavior: Behavior normal.         DIAGNOSTIC RESULTS     EKG: All EKG's are interpreted by the Emergency Department Physician who either signs or Co-signs this chart in the absence of a cardiologist.    none    RADIOLOGY:   Non-plain film images such as CT, Ultrasound and MRI are read by the radiologist. Plain radiographic images are visualized and preliminarily interpreted by the emergency physician with the below findings:    Interpretation per the Radiologist below, if available at the time of this note:    CT FACIAL BONES W CONTRAST   Final Result   Severe right facial cellulitis. No macro abscess but a small dental abscess   cannot be excluded. Reactive lymphadenopathy.                ED BEDSIDE ULTRASOUND:   Performed by ED Physician - none    LABS:  Labs Reviewed   CBC WITH AUTO DIFFERENTIAL - Abnormal; Notable for the following components:       Result Value    Hemoglobin 8.1 (*)     Hematocrit 26.5 (*)     MCV 64.5 (*)     MCH 19.7 (*)     MCHC 30.5 (*)     RDW 20.0 (*)     All other components within normal limits   COMPREHENSIVE METABOLIC PANEL W/ REFLEX TO MG FOR LOW K - Abnormal; Notable for the following components:    Glucose 121 (*)     Calcium 8.4 (*)     Sodium 133 (*)     Anion Gap 7 (*)     Alkaline Phosphatase 123 (*)     Total Bilirubin 0.19 (*)     All other components within normal limits CULTURE, BLOOD 1   CULTURE, BLOOD 1   LACTATE, SEPSIS   HCG, SERUM, QUALITATIVE   PERIPHERAL BLOOD SMEAR, PATH REVIEW       All other labs were within normal range or not returned as of this dictation. EMERGENCY DEPARTMENT COURSE and DIFFERENTIAL DIAGNOSIS/MDM:   Vitals:    Vitals:    12/07/21 2000   BP: (!) 157/72   Pulse: 102   Resp: 16   Temp: 98.7 °F (37.1 °C)   TempSrc: Oral   SpO2: 100%   Weight: (!) 325 lb (147.4 kg)   Height: 5' 1\" (1.549 m)     I did discuss with her my concerns and the need for a dose of IV antibiotics here. I would like to get some labs and a CT to make sure there is no abscess needing drainage. She is agreeable. I have answered any questions she has at this time. I did go over results with her including that there is no abscess and her lab work really looks pretty good. I think she is safe to go home and follow-up with family physician. We also discussed what to return to the emergency department for as well. CONSULTS:  None    PROCEDURES:  None    FINAL IMPRESSION      1.  Facial cellulitis          DISPOSITION/PLAN   DISPOSITION Decision To Discharge 12/08/2021 12:57:13 AM      PATIENT REFERRED TO:  Placido Perez MD  [address]    Call in 1 day  For wound re-check      DISCHARGE MEDICATIONS:  Discharge Medication List as of 12/8/2021 12:57 AM      START taking these medications    Details   clindamycin (CLEOCIN) 300 MG capsule Take 1 capsule by mouth 2 times daily for 7 days, Disp-14 capsule, R-0Normal             (Please note that portions of this note were completed with a voice recognition program.  Efforts were made to edit the dictations but occasionally words are mis-transcribed.)    Jim Barton MD  Attending Emergency Physician        Jim Barton MD  12/08/21 [id number]

## 2021-12-08 NOTE — ED TRIAGE NOTES
Mode of arrival (squad #, walk in, police, etc) : walk-in        Chief complaint(s): Facial swelling, generalized swelling on right side of body. Arrival Note (brief scenario, treatment PTA, etc). : Patient reports generalized right sided body swelling and facial swelling since this morning. C= \"Have you ever felt that you should Cut down on your drinking? \"  No  A= \"Have people Annoyed you by criticizing your drinking? \"  No  G= \"Have you ever felt bad or Guilty about your drinking? \"  No  E= \"Have you ever had a drink as an Eye-opener first thing in the morning to steady your nerves or to help a hangover? \"  No      Deferred []      Reason for deferring: N/A    *If yes to two or more: probable alcohol abuse. *

## 2021-12-09 LAB — SURGICAL PATHOLOGY REPORT: NORMAL

## 2021-12-10 LAB — PATHOLOGIST REVIEW: NORMAL

## 2021-12-13 LAB
CULTURE: NORMAL
CULTURE: NORMAL
Lab: NORMAL
Lab: NORMAL
SPECIMEN DESCRIPTION: NORMAL
SPECIMEN DESCRIPTION: NORMAL

## 2021-12-15 RX ORDER — FAMOTIDINE 20 MG/1
20 TABLET, FILM COATED ORAL 2 TIMES DAILY
Qty: 60 TABLET | Refills: 3 | Status: SHIPPED | OUTPATIENT
Start: 2021-12-15 | End: 2022-04-06

## 2021-12-17 ENCOUNTER — OFFICE VISIT (OUTPATIENT)
Dept: INTERNAL MEDICINE CLINIC | Age: 36
End: 2021-12-17
Payer: MEDICAID

## 2021-12-17 VITALS
HEIGHT: 61 IN | OXYGEN SATURATION: 98 % | WEIGHT: 293 LBS | DIASTOLIC BLOOD PRESSURE: 84 MMHG | BODY MASS INDEX: 55.32 KG/M2 | HEART RATE: 93 BPM | SYSTOLIC BLOOD PRESSURE: 138 MMHG

## 2021-12-17 DIAGNOSIS — F33.42 RECURRENT MAJOR DEPRESSIVE DISORDER, IN FULL REMISSION (HCC): ICD-10-CM

## 2021-12-17 DIAGNOSIS — L03.211 FACIAL CELLULITIS: Primary | ICD-10-CM

## 2021-12-17 DIAGNOSIS — F11.988 OPIOID USE, UNSPECIFIED WITH OTHER OPIOID-INDUCED DISORDER (HCC): ICD-10-CM

## 2021-12-17 DIAGNOSIS — E66.01 MORBID OBESITY WITH BMI OF 60.0-69.9, ADULT (HCC): ICD-10-CM

## 2021-12-17 PROCEDURE — G8417 CALC BMI ABV UP PARAM F/U: HCPCS | Performed by: INTERNAL MEDICINE

## 2021-12-17 PROCEDURE — G8482 FLU IMMUNIZE ORDER/ADMIN: HCPCS | Performed by: INTERNAL MEDICINE

## 2021-12-17 PROCEDURE — 99214 OFFICE O/P EST MOD 30 MIN: CPT | Performed by: INTERNAL MEDICINE

## 2021-12-17 PROCEDURE — G8427 DOCREV CUR MEDS BY ELIG CLIN: HCPCS | Performed by: INTERNAL MEDICINE

## 2021-12-17 PROCEDURE — 4004F PT TOBACCO SCREEN RCVD TLK: CPT | Performed by: INTERNAL MEDICINE

## 2021-12-17 RX ORDER — PHENTERMINE HYDROCHLORIDE 37.5 MG/1
37.5 TABLET ORAL
Qty: 30 TABLET | Refills: 0 | Status: SHIPPED | OUTPATIENT
Start: 2021-12-17 | End: 2022-01-16

## 2021-12-17 NOTE — PROGRESS NOTES
with clindamycin      Weight m/m  Lost 7lbs since last visit  Will refill  No palpitations/anxiety  Liver enzymes okay        REVIEW OF SYSTEMS (except Subjective (HPI))  GENERAL: No fevers / chills  RESPIRATORY: Negative for cough, wheezing or shortness of breath  CARDIOVASCULAR: Negative for chest pain or palpitations. GI: no nausea, vomiting, or diarrhea  NEURO: No history of headaches    Past Medical History:   Diagnosis Date    History of heroin use 9/16/2020       SOCIAL HISTORY:  Social History     Socioeconomic History    Marital status: Single     Spouse name: Not on file    Number of children: Not on file    Years of education: Not on file    Highest education level: Not on file   Occupational History    Not on file   Tobacco Use    Smoking status: Current Every Day Smoker     Packs/day: 0.50     Types: Cigarettes    Smokeless tobacco: Never Used   Vaping Use    Vaping Use: Never used   Substance and Sexual Activity    Alcohol use: No    Drug use: No     Comment: on Methadone    Sexual activity: Not on file   Other Topics Concern    Not on file   Social History Narrative    Not on file     Social Determinants of Health     Financial Resource Strain:     Difficulty of Paying Living Expenses: Not on file   Food Insecurity:     Worried About [facility] in the Last Year: Not on file    Stephanie of Food in the Last Year: Not on file   Transportation Needs:     Lack of Transportation (Medical): Not on file    Lack of Transportation (Non-Medical):  Not on file   Physical Activity:     Days of Exercise per Week: Not on file    Minutes of Exercise per Session: Not on file   Stress:     Feeling of Stress : Not on file   Social Connections:     Frequency of Communication with Friends and Family: Not on file    Frequency of Social Gatherings with Friends and Family: Not on file    Attends Mosque Services: Not on file    Active Member of Clubs or Organizations: Not on file   Deangelo Attends Club or Organization Meetings: Not on file    Marital Status: Not on file   Intimate Partner Violence:     Fear of Current or Ex-Partner: Not on file    Emotionally Abused: Not on file    Physically Abused: Not on file    Sexually Abused: Not on file   Housing Stability:     Unable to Pay for Housing in the Last Year: Not on file    Number of Nicho in the Last Year: Not on file    Unstable Housing in the Last Year: Not on file           CURRENT MEDICATIONS:  Current Outpatient Medications   Medication Sig Dispense Refill    famotidine (PEPCID) 20 MG tablet TAKE 1 TABLET BY MOUTH 2 TIMES DAILY 60 tablet 3    phentermine (ADIPEX-P) 37.5 MG tablet Take 1 tablet by mouth every morning (before breakfast) for 30 days. 30 tablet 0    Semaglutide-Weight Management (WEGOVY) 0.25 MG/0.5ML SOAJ SC injection Inject 0.25 mg into the skin every 7 days 2 mL 0    ketoconazole (NIZORAL) 2 % cream Apply 1 Applicatorful topically 2 times daily Apply topically BID for 2-4 weeks 60 g 3    REXULTI 0.5 MG TABS tablet ONE (EACH) TABLET BY MOUTH DAILY;      sertraline (ZOLOFT) 100 MG tablet 1 (EACH) DAILY IN THE MORNING;      fluticasone (FLONASE) 50 MCG/ACT nasal spray 2 sprays by Nasal route daily 1 Bottle 0    albuterol sulfate HFA (PROAIR HFA) 108 (90 Base) MCG/ACT inhaler Inhale 2 puffs into the lungs every 4 hours as needed for Wheezing 1 Inhaler 3    bumetanide (BUMEX) 1 MG tablet Take 1 tablet by mouth 2 times daily 30 tablet 11    ferrous sulfate (IRON 325) 325 (65 Fe) MG tablet TAKE 1 TABLET BY MOUTH 3 TIMES DAILY (WITH MEALS) 90 tablet 2    Masks (CLEVER CHOICE FACE MASK) MISC USE NEW FACE MASK EVERYDAY WHEN PATIENT GO OUTSIDE OF HOME DUE TO COVID PANDEMIC 90 each 3    QUETIAPINE FUMARATE PO Take 75 mg by mouth nightly       prazosin (MINIPRESS) 1 MG capsule Take 1 capsule by mouth nightly       methadone (DOLOPHINE) 10 MG tablet Take 120 mg by mouth daily.         No current facility-administered medications for this visit. OBJECTIVE:  Vitals:    12/17/21 1216   BP: 138/84   Pulse: 93   SpO2: 98%     Body mass index is 58.95 kg/m². Focal exam:  No facial swelling or tenderness  General exam (except above):  General appearance - well appearing, alert, in no acute distress  Head - Atraumatic, normocephalic  Eyes - EOMI, no jaundice or pallor  Lungs - Lungs clear to auscultation. No wheezing, rhonchi, rales  Heart - RRR without murmur, gallop, or rubs. No ectopy  Abdomen - Abdomen soft, non-tender. Bowel sounds normal. No masses, organomegaly  Extremities -No significant edema, or skin discoloration. Good capillary refill. Neuro - Pt Alert, awake and oriented x 3. No gross focal neurological deficits    ASSESSMENT AND PLAN (MEDICAL DECISION MAKING):   Wing Paniagua was seen today for ed follow-up, weight management and leg swelling. Diagnoses and all orders for this visit:    Facial cellulitis  Comments:  Recently seen in ER for this, resolved with antibiotics CT scan concerning for dental abscesspatient advised to follow-up with dentist    Morbid obesity with BMI of 60.0-69.9, adult ([facility] [address]  Comments:  Losing weight with phentermine lost 7 pounds in last 1 month  Orders:  -     phentermine (ADIPEX-P) 37.5 MG tablet; Take 1 tablet by mouth every morning (before breakfast) for 30 days.     Recurrent major depressive disorder, in full remission ([address]    Opioid use, unspecified with other opioid-induced disorder ([address]           Follow up in: 1 month      Abiola Kelley MD

## 2022-01-21 ENCOUNTER — TELEPHONE (OUTPATIENT)
Dept: INTERNAL MEDICINE CLINIC | Age: 37
End: 2022-01-21

## 2022-01-21 NOTE — TELEPHONE ENCOUNTER
Mailed patient no show appointment letter # 1 for the date of 01/18/2022 scheduled with Dr Florecita Paige.

## 2022-04-06 DIAGNOSIS — L30.4 INTERTRIGO: ICD-10-CM

## 2022-04-06 RX ORDER — FAMOTIDINE 20 MG/1
20 TABLET, FILM COATED ORAL 2 TIMES DAILY
Qty: 60 TABLET | Refills: 3 | Status: SHIPPED | OUTPATIENT
Start: 2022-04-06 | End: 2022-08-21 | Stop reason: SINTOL

## 2022-04-06 RX ORDER — KETOCONAZOLE 20 MG/G
CREAM TOPICAL
Qty: 60 G | Refills: 3 | Status: SHIPPED | OUTPATIENT
Start: 2022-04-06

## 2022-05-19 DIAGNOSIS — E66.01 MORBID OBESITY WITH BMI OF 60.0-69.9, ADULT (HCC): ICD-10-CM

## 2022-05-19 RX ORDER — PHENTERMINE HYDROCHLORIDE 37.5 MG/1
37.5 TABLET ORAL
Qty: 30 TABLET | OUTPATIENT
Start: 2022-05-19 | End: 2022-06-18

## 2022-08-21 ENCOUNTER — APPOINTMENT (OUTPATIENT)
Dept: CT IMAGING | Age: 37
End: 2022-08-21
Payer: MEDICAID

## 2022-08-21 ENCOUNTER — HOSPITAL ENCOUNTER (EMERGENCY)
Age: 37
Discharge: HOME | End: 2022-08-21
Attending: STUDENT IN AN ORGANIZED HEALTH CARE EDUCATION/TRAINING PROGRAM
Payer: MEDICAID

## 2022-08-21 VITALS
SYSTOLIC BLOOD PRESSURE: 135 MMHG | HEART RATE: 78 BPM | TEMPERATURE: 98.6 F | RESPIRATION RATE: 16 BRPM | WEIGHT: 293 LBS | BODY MASS INDEX: 57.52 KG/M2 | DIASTOLIC BLOOD PRESSURE: 73 MMHG | HEIGHT: 60 IN | OXYGEN SATURATION: 97 %

## 2022-08-21 DIAGNOSIS — K21.9 GASTROESOPHAGEAL REFLUX DISEASE, UNSPECIFIED WHETHER ESOPHAGITIS PRESENT: ICD-10-CM

## 2022-08-21 DIAGNOSIS — N10 ACUTE PYELONEPHRITIS: Primary | ICD-10-CM

## 2022-08-21 LAB
ABSOLUTE EOS #: 0 K/UL (ref 0–0.4)
ABSOLUTE LYMPH #: 2.26 K/UL (ref 1–4.8)
ABSOLUTE MONO #: 0.39 K/UL (ref 0.1–1.3)
ALBUMIN SERPL-MCNC: 3.9 G/DL (ref 3.5–5.2)
ALP BLD-CCNC: 109 U/L (ref 35–104)
ALT SERPL-CCNC: 11 U/L (ref 5–33)
ANION GAP SERPL CALCULATED.3IONS-SCNC: 12 MMOL/L (ref 9–17)
AST SERPL-CCNC: 12 U/L
BACTERIA: ABNORMAL
BASOPHILS # BLD: 0 % (ref 0–2)
BASOPHILS ABSOLUTE: 0 K/UL (ref 0–0.2)
BILIRUB SERPL-MCNC: 0.33 MG/DL (ref 0.3–1.2)
BILIRUBIN URINE: NEGATIVE
BUN BLDV-MCNC: 12 MG/DL (ref 6–20)
CALCIUM SERPL-MCNC: 8.8 MG/DL (ref 8.6–10.4)
CHLORIDE BLD-SCNC: 101 MMOL/L (ref 98–107)
CO2: 21 MMOL/L (ref 20–31)
COLOR: ABNORMAL
CREAT SERPL-MCNC: 0.68 MG/DL (ref 0.5–0.9)
EOSINOPHILS RELATIVE PERCENT: 0 % (ref 0–4)
EPITHELIAL CELLS UA: ABNORMAL /HPF
GFR AFRICAN AMERICAN: >60 ML/MIN
GFR NON-AFRICAN AMERICAN: >60 ML/MIN
GFR SERPL CREATININE-BSD FRML MDRD: ABNORMAL ML/MIN/{1.73_M2}
GLUCOSE BLD-MCNC: 99 MG/DL (ref 70–99)
GLUCOSE URINE: NEGATIVE
HCG QUALITATIVE: NEGATIVE
HCT VFR BLD CALC: 28.6 % (ref 36–46)
HEMOGLOBIN: 8.4 G/DL (ref 12–16)
KETONES, URINE: NEGATIVE
LEUKOCYTE ESTERASE, URINE: ABNORMAL
LIPASE: 25 U/L (ref 13–60)
LYMPHOCYTES # BLD: 29 % (ref 24–44)
MAGNESIUM: 1.6 MG/DL (ref 1.6–2.6)
MCH RBC QN AUTO: 18.3 PG (ref 26–34)
MCHC RBC AUTO-ENTMCNC: 29.5 G/DL (ref 31–37)
MCV RBC AUTO: 62.1 FL (ref 80–100)
MONOCYTES # BLD: 5 % (ref 1–7)
MORPHOLOGY: ABNORMAL
NITRITE, URINE: NEGATIVE
PDW BLD-RTO: 21.2 % (ref 11.5–14.9)
PH UA: 5.5 (ref 5–8)
PLATELET # BLD: 359 K/UL (ref 150–450)
PMV BLD AUTO: 8.4 FL (ref 6–12)
POTASSIUM SERPL-SCNC: 3.8 MMOL/L (ref 3.7–5.3)
PROTEIN UA: ABNORMAL
RBC # BLD: 4.6 M/UL (ref 4–5.2)
RBC UA: ABNORMAL /HPF
SEG NEUTROPHILS: 66 % (ref 36–66)
SEGMENTED NEUTROPHILS ABSOLUTE COUNT: 5.15 K/UL (ref 1.3–9.1)
SODIUM BLD-SCNC: 134 MMOL/L (ref 135–144)
SPECIFIC GRAVITY UA: 1.09 (ref 1–1.03)
TOTAL PROTEIN: 7.3 G/DL (ref 6.4–8.3)
TURBIDITY: CLEAR
URINE HGB: ABNORMAL
UROBILINOGEN, URINE: NORMAL
WBC # BLD: 7.8 K/UL (ref 3.5–11)
WBC UA: ABNORMAL /HPF
YEAST: ABNORMAL

## 2022-08-21 PROCEDURE — 80053 COMPREHEN METABOLIC PANEL: CPT

## 2022-08-21 PROCEDURE — C9113 INJ PANTOPRAZOLE SODIUM, VIA: HCPCS | Performed by: STUDENT IN AN ORGANIZED HEALTH CARE EDUCATION/TRAINING PROGRAM

## 2022-08-21 PROCEDURE — 87086 URINE CULTURE/COLONY COUNT: CPT

## 2022-08-21 PROCEDURE — 83690 ASSAY OF LIPASE: CPT

## 2022-08-21 PROCEDURE — 81001 URINALYSIS AUTO W/SCOPE: CPT

## 2022-08-21 PROCEDURE — A4216 STERILE WATER/SALINE, 10 ML: HCPCS | Performed by: STUDENT IN AN ORGANIZED HEALTH CARE EDUCATION/TRAINING PROGRAM

## 2022-08-21 PROCEDURE — 96375 TX/PRO/DX INJ NEW DRUG ADDON: CPT

## 2022-08-21 PROCEDURE — 2580000003 HC RX 258: Performed by: STUDENT IN AN ORGANIZED HEALTH CARE EDUCATION/TRAINING PROGRAM

## 2022-08-21 PROCEDURE — 84703 CHORIONIC GONADOTROPIN ASSAY: CPT

## 2022-08-21 PROCEDURE — 6360000002 HC RX W HCPCS: Performed by: STUDENT IN AN ORGANIZED HEALTH CARE EDUCATION/TRAINING PROGRAM

## 2022-08-21 PROCEDURE — 96374 THER/PROPH/DIAG INJ IV PUSH: CPT

## 2022-08-21 PROCEDURE — 6370000000 HC RX 637 (ALT 250 FOR IP): Performed by: STUDENT IN AN ORGANIZED HEALTH CARE EDUCATION/TRAINING PROGRAM

## 2022-08-21 PROCEDURE — 36415 COLL VENOUS BLD VENIPUNCTURE: CPT

## 2022-08-21 PROCEDURE — 83735 ASSAY OF MAGNESIUM: CPT

## 2022-08-21 PROCEDURE — 74177 CT ABD & PELVIS W/CONTRAST: CPT

## 2022-08-21 PROCEDURE — 85025 COMPLETE CBC W/AUTO DIFF WBC: CPT

## 2022-08-21 PROCEDURE — 6360000004 HC RX CONTRAST MEDICATION: Performed by: STUDENT IN AN ORGANIZED HEALTH CARE EDUCATION/TRAINING PROGRAM

## 2022-08-21 PROCEDURE — 99285 EMERGENCY DEPT VISIT HI MDM: CPT

## 2022-08-21 PROCEDURE — 96361 HYDRATE IV INFUSION ADD-ON: CPT

## 2022-08-21 RX ORDER — MORPHINE SULFATE 4 MG/ML
4 INJECTION, SOLUTION INTRAMUSCULAR; INTRAVENOUS ONCE
Status: COMPLETED | OUTPATIENT
Start: 2022-08-21 | End: 2022-08-21

## 2022-08-21 RX ORDER — PHENAZOPYRIDINE HYDROCHLORIDE 100 MG/1
100 TABLET, FILM COATED ORAL 3 TIMES DAILY PRN
Qty: 10 TABLET | Refills: 0 | Status: SHIPPED | OUTPATIENT
Start: 2022-08-21 | End: 2022-08-24

## 2022-08-21 RX ORDER — PANTOPRAZOLE SODIUM 20 MG/1
20 TABLET, DELAYED RELEASE ORAL DAILY
Qty: 30 TABLET | Refills: 0 | Status: SHIPPED | OUTPATIENT
Start: 2022-08-21

## 2022-08-21 RX ORDER — CEPHALEXIN 500 MG/1
500 CAPSULE ORAL 4 TIMES DAILY
Qty: 28 CAPSULE | Refills: 0 | Status: SHIPPED | OUTPATIENT
Start: 2022-08-21 | End: 2022-08-28

## 2022-08-21 RX ORDER — KETOROLAC TROMETHAMINE 30 MG/ML
15 INJECTION, SOLUTION INTRAMUSCULAR; INTRAVENOUS ONCE
Status: COMPLETED | OUTPATIENT
Start: 2022-08-21 | End: 2022-08-21

## 2022-08-21 RX ORDER — 0.9 % SODIUM CHLORIDE 0.9 %
1000 INTRAVENOUS SOLUTION INTRAVENOUS ONCE
Status: COMPLETED | OUTPATIENT
Start: 2022-08-21 | End: 2022-08-21

## 2022-08-21 RX ORDER — CEPHALEXIN 250 MG/1
500 CAPSULE ORAL ONCE
Status: COMPLETED | OUTPATIENT
Start: 2022-08-21 | End: 2022-08-21

## 2022-08-21 RX ORDER — SODIUM CHLORIDE 0.9 % (FLUSH) 0.9 %
5-40 SYRINGE (ML) INJECTION 2 TIMES DAILY
Status: DISCONTINUED | OUTPATIENT
Start: 2022-08-21 | End: 2022-08-21 | Stop reason: HOSPADM

## 2022-08-21 RX ORDER — ONDANSETRON 2 MG/ML
4 INJECTION INTRAMUSCULAR; INTRAVENOUS ONCE
Status: COMPLETED | OUTPATIENT
Start: 2022-08-21 | End: 2022-08-21

## 2022-08-21 RX ORDER — 0.9 % SODIUM CHLORIDE 0.9 %
100 INTRAVENOUS SOLUTION INTRAVENOUS ONCE
Status: COMPLETED | OUTPATIENT
Start: 2022-08-21 | End: 2022-08-21

## 2022-08-21 RX ADMIN — MORPHINE SULFATE 4 MG: 4 INJECTION, SOLUTION INTRAMUSCULAR; INTRAVENOUS at 12:50

## 2022-08-21 RX ADMIN — ONDANSETRON 4 MG: 2 INJECTION INTRAMUSCULAR; INTRAVENOUS at 12:47

## 2022-08-21 RX ADMIN — SODIUM CHLORIDE 100 ML: 9 INJECTION, SOLUTION INTRAVENOUS at 13:57

## 2022-08-21 RX ADMIN — SODIUM CHLORIDE, PRESERVATIVE FREE 10 ML: 5 INJECTION INTRAVENOUS at 13:55

## 2022-08-21 RX ADMIN — SODIUM CHLORIDE, PRESERVATIVE FREE 10 ML: 5 INJECTION INTRAVENOUS at 13:56

## 2022-08-21 RX ADMIN — KETOROLAC TROMETHAMINE 15 MG: 30 INJECTION, SOLUTION INTRAMUSCULAR at 12:49

## 2022-08-21 RX ADMIN — IOPAMIDOL 75 ML: 755 INJECTION, SOLUTION INTRAVENOUS at 13:54

## 2022-08-21 RX ADMIN — CEPHALEXIN 500 MG: 250 CAPSULE ORAL at 15:16

## 2022-08-21 RX ADMIN — SODIUM CHLORIDE 40 MG: 9 INJECTION INTRAMUSCULAR; INTRAVENOUS; SUBCUTANEOUS at 12:42

## 2022-08-21 RX ADMIN — SODIUM CHLORIDE 1000 ML: 9 INJECTION, SOLUTION INTRAVENOUS at 12:41

## 2022-08-21 ASSESSMENT — PAIN DESCRIPTION - ORIENTATION
ORIENTATION: RIGHT;LOWER
ORIENTATION: LOWER

## 2022-08-21 ASSESSMENT — PAIN DESCRIPTION - LOCATION
LOCATION: ABDOMEN

## 2022-08-21 ASSESSMENT — ENCOUNTER SYMPTOMS
NAUSEA: 1
COUGH: 0
SHORTNESS OF BREATH: 0
ABDOMINAL PAIN: 1
BACK PAIN: 1
VOMITING: 1
RHINORRHEA: 0
CONSTIPATION: 1

## 2022-08-21 ASSESSMENT — PAIN SCALES - GENERAL
PAINLEVEL_OUTOF10: 3
PAINLEVEL_OUTOF10: 10
PAINLEVEL_OUTOF10: 8

## 2022-08-21 ASSESSMENT — PAIN - FUNCTIONAL ASSESSMENT: PAIN_FUNCTIONAL_ASSESSMENT: 0-10

## 2022-08-21 ASSESSMENT — PAIN DESCRIPTION - DESCRIPTORS
DESCRIPTORS: DISCOMFORT
DESCRIPTORS: ACHING

## 2022-08-21 NOTE — ED PROVIDER NOTES
[facility] [place]  Emergency Department Encounter  Emergency Medicine Physician     Pt Name: Corey Greenfield  MRN: [id number]  [place] 1985  Date of evaluation: 8/21/22  PCP:  Adrien Garrett MD    CHIEF COMPLAINT       Chief Complaint   Patient presents with    Abdominal Pain    Pharyngitis     Feels like their is something in his throat. HISTORY OF PRESENT ILLNESS  (Location/Symptom, Timing/Onset, Context/Setting, Quality, Duration, Modifying Factors, Severity.)    Corey Greenfield is a 30 y.o. female who presents with abdominal pain worsening since Wednesday of last week. Patient states that she started out originally with some sore throat and some facial swelling which abated after she took some Benadryl. She is noted that the next day she began having some epigastric pain with some nausea which then progressed to vomiting. She states the pain eventually found its way down into her lower abdomen and is worse on her left flank and it is on her right. Upon entering the room, patient is bent over on the bed with her forearms flat on the bed patient is complaining again mostly of lower abdominal pain from radiation from the left flank. Denies any dysuria or hematuria. Denies any previous history of nephrolithiasis. Endorses previous surgical procedure of tubal ligation. States she still has her appendix till has her gallbladder. Denies any right upper quadrant pain. Denies any right lower quadrant pain. Endorses ongoing nausea and intermittent vomiting and a feeling of \"severe heartburn\". Denies any chest pain or shortness of breath. Denies any fevers or chills. Denies any myalgias or extremity pain. Denies any difficulty breathing. Denies any current sore throat or epigastric pain.   Also states that she has not had a bowel movement since last Tuesday but states that she has been passing some gas intermittently        PAST MEDICAL / SURGICAL / SOCIAL / FAMILY HISTORY    has a past medical history of History of heroin use.     has a past surgical history that includes Tubal ligation. Social History     Socioeconomic History    Marital status: Single     Spouse name: Not on file    Number of children: Not on file    Years of education: Not on file    Highest education level: Not on file   Occupational History    Not on file   Tobacco Use    Smoking status: Every Day     Packs/day: 0.50     Types: Cigarettes    Smokeless tobacco: Never   Vaping Use    Vaping Use: Never used   Substance and Sexual Activity    Alcohol use: No    Drug use: No     Comment: on Methadone    Sexual activity: Not on file   Other Topics Concern    Not on file   Social History Narrative    Not on file     Social Determinants of Health     Financial Resource Strain: Not on file   Food Insecurity: Not on file   Transportation Needs: Not on file   Physical Activity: Not on file   Stress: Not on file   Social Connections: Not on file   Intimate Partner Violence: Not on file   Housing Stability: Not on file       No family history on file. Allergies:    Famotidine    Home Medications:  Prior to Admission medications    Medication Sig Start Date End Date Taking? Authorizing Provider   pantoprazole (PROTONIX) 20 MG tablet Take 1 tablet by mouth daily 8/21/22  Yes Fracisco Orozco DO   cephALEXin (KEFLEX) 500 MG capsule Take 1 capsule by mouth 4 times daily for 7 days 8/21/22 8/28/22 Yes Fracisco Orozco DO   phenazopyridine (PYRIDIUM) 100 MG tablet Take 1 tablet by mouth 3 times daily as needed for Pain 8/21/22 8/24/22 Yes Fracisco Orozco DO   ketoconazole (NIZORAL) 2 % cream APPLY TOPICALLY 2 TIMES DAILY FOR 2-4 WEEKS 4/6/22   Bhavani Culp MD   sertraline (ZOLOFT) 50 MG tablet TAKE 1 (EACH) TAB BY MOUTH DAILY; TAKE WITH 100MG DOSE TO EQUAL 150MG TOTAL.  12/16/21   Historical Provider, MD   REXULTI 0.5 MG TABS tablet ONE (EACH) TABLET BY MOUTH DAILY; 8/5/21   Historical Provider, MD   sertraline (ZOLOFT) kg)       Physical Exam  Vitals and nursing note reviewed. Constitutional:       Appearance: She is well-developed. She is obese. She is ill-appearing. She is not toxic-appearing or diaphoretic. Cardiovascular:      Rate and Rhythm: Regular rhythm. Tachycardia present. Pulses: Normal pulses. Radial pulses are 2+ on the right side and 2+ on the left side. Heart sounds: Normal heart sounds. Pulmonary:      Effort: Pulmonary effort is normal. No respiratory distress. Breath sounds: Normal breath sounds. No decreased breath sounds, wheezing, rhonchi or rales. Abdominal:      General: There is no distension. Palpations: Abdomen is soft. Tenderness: There is abdominal tenderness in the suprapubic area, left upper quadrant and left lower quadrant. There is left CVA tenderness. There is no right CVA tenderness, guarding or rebound. Negative signs include Canchola's sign and McBurney's sign. Skin:     General: Skin is warm and dry. Capillary Refill: Capillary refill takes less than 2 seconds. Neurological:      Mental Status: She is alert and oriented to person, place, and time.        DIFFERENTIAL  DIAGNOSIS   PLAN (LABS / IMAGING / EKG):  Orders Placed This Encounter   Procedures    Culture, Urine    CT ABDOMEN PELVIS W IV CONTRAST Additional Contrast? None    CBC with Auto Differential    CMP    HCG Qualitative, Serum    Urinalysis with Reflex to Culture    Magnesium    Lipase    Microscopic Urinalysis       MEDICATIONS ORDERED:  Orders Placed This Encounter   Medications    0.9 % sodium chloride bolus    ondansetron (ZOFRAN) injection 4 mg    pantoprazole (PROTONIX) 40 mg in sodium chloride (PF) 10 mL injection    ketorolac (TORADOL) injection 15 mg    morphine sulfate (PF) injection 4 mg    iopamidol (ISOVUE-370) 76 % injection 75 mL    0.9 % sodium chloride bolus    DISCONTD: sodium chloride flush 0.9 % injection 5-40 mL    pantoprazole (PROTONIX) 20 MG tablet     Sig: of the mA/kV was utilized to reduce the radiation dose to as low as reasonably achievable. COMPARISON: 10/20/2020 HISTORY: ORDERING SYSTEM PROVIDED HISTORY: left flank pain with diffuse radiation, no BM x 4 days TECHNOLOGIST PROVIDED HISTORY: left flank pain with diffuse radiation, no BM x 4 days Decision Support Exception - unselect if not a suspected or confirmed emergency medical condition->Emergency Medical Condition (MA) Reason for Exam: left flank pain with diffuse radiation, no BM x 4 days Additional signs and symptoms: Pt states lower abdominal/pelvic pain. No surgical hx FINDINGS: Lower Chest: Unremarkable Organs: The liver, gallbladder, pancreas and spleen appear normal.  The adrenal glands and kidneys appear normal.  There are no renal calculi and there is no evidence of hydronephrosis. GI/Bowel: The stomach, small bowel loops and colon appear normal.  There is a normal amount of fecal material in the colon. The appendix appear unremarkable. Pelvis: The uterus, bladder and the rectum appear normal.  There is no pelvic adenopathy. Peritoneum/Retroperitoneum: Unremarkable Bones/Soft Tissues: Unremarkable     No acute process identified. Impression:  Patient with evolving abdominal pain over the past several days. Has not had a bowel movement in several days as well. Describes which she feels as severe heartburn without any epigastric pain however most of her pain is coming from the left flank radiating down to the lower abdomen. No pain in the right anterior abdomen. Does have some pain close to the right SI joint however. Does appear to be in a fair amount of discomfort. Will treat symptomatically with fluids, Toradol, morphine, Zofran, and Protonix. We will get lab work, urinalysis, serum pregnancy and reevaluate. Patient will likely require a CT scan. EMERGENCY DEPARTMENT COURSE:  ED Course as of 08/22/22 0808   Sun Aug 21, 2022   1304 hCG Qual: NEGATIVE [AP]   8240 hCG negative. ED, and plan of care upon discharge was discussed in length with the patient/patient representative. Patient/patient representative had no further questions prior to being discharged and was instructed to return to the ED for new or worsening symptoms. Any changes to existing medications or new prescriptions were reviewed with patient/patient representative and they expressed understanding of how to correctly take their medications and the possible side effects.     PATIENT REFERRED TO:  MD Shiva Villegas [address] [facility]  [phone]    Schedule an appointment as soon as possible for a visit in 3 days      DISCHARGE MEDICATIONS:  Discharge Medication List as of 8/21/2022  3:13 PM        START taking these medications    Details   pantoprazole (PROTONIX) 20 MG tablet Take 1 tablet by mouth daily, Disp-30 tablet, R-0Normal      cephALEXin (KEFLEX) 500 MG capsule Take 1 capsule by mouth 4 times daily for 7 days, Disp-28 capsule, R-0Normal      phenazopyridine (PYRIDIUM) 100 MG tablet Take 1 tablet by mouth 3 times daily as needed for Pain, Disp-10 tablet, R-0Normal             Jasmyne Mclaughlin DO  Emergency Medicine Attending    (Please note that portions of this note were completed with a voice recognition program.  Efforts were made to edit the dictations but occasionally words are mis-transcribed.)         Jasmyne Mclaughlin DO  08/22/22 7002

## 2022-08-21 NOTE — ED NOTES
Mode of arrival (squad #, walk in, police, etc) : Walk in         Chief complaint(s): Abdominal Pain. Arrival Note (brief scenario, treatment PTA, etc). : Patient reports lower abdominal pain for 2 days. Reports fevers and emesis at home. C= \"Have you ever felt that you should Cut down on your drinking? \"  No  A= \"Have people Annoyed you by criticizing your drinking? \"  No  G= \"Have you ever felt bad or Guilty about your drinking? \"  No  E= \"Have you ever had a drink as an Eye-opener first thing in the morning to steady your nerves or to help a hangover? \"  No      Deferred []      Reason for deferring: N/A    *If yes to two or more: probable alcohol abuse. Veronica Odonnell RN  08/21/22 7847

## 2022-08-21 NOTE — DISCHARGE INSTRUCTIONS
Please take the Keflex 4 times a day every day for the next 7 days  Please start taking the Protonix once a day  Please discontinue your Pepcid  Please follow-up with your primary care provider soon as possible  You may take the Pyridium as needed as prescribed throughout the day  Return to the emergency room any severe or debilitating symptoms that you cannot control at home

## 2022-08-22 LAB
CULTURE: NORMAL
SPECIMEN DESCRIPTION: NORMAL

## 2022-08-31 ENCOUNTER — HOSPITAL ENCOUNTER (EMERGENCY)
Age: 37
Discharge: HOME | End: 2022-08-31
Attending: EMERGENCY MEDICINE
Payer: MEDICAID

## 2022-08-31 VITALS
BODY MASS INDEX: 57.52 KG/M2 | TEMPERATURE: 98.3 F | OXYGEN SATURATION: 100 % | RESPIRATION RATE: 20 BRPM | SYSTOLIC BLOOD PRESSURE: 129 MMHG | WEIGHT: 293 LBS | HEIGHT: 60 IN | DIASTOLIC BLOOD PRESSURE: 69 MMHG | HEART RATE: 76 BPM

## 2022-08-31 DIAGNOSIS — R22.0 FACIAL SWELLING: Primary | ICD-10-CM

## 2022-08-31 PROCEDURE — 6360000002 HC RX W HCPCS: Performed by: PHYSICIAN ASSISTANT

## 2022-08-31 PROCEDURE — 99284 EMERGENCY DEPT VISIT MOD MDM: CPT

## 2022-08-31 PROCEDURE — 96372 THER/PROPH/DIAG INJ SC/IM: CPT

## 2022-08-31 RX ORDER — DIPHENHYDRAMINE HCL 50 MG
50 CAPSULE ORAL EVERY 6 HOURS PRN
Qty: 30 CAPSULE | Refills: 0 | Status: SHIPPED | OUTPATIENT
Start: 2022-08-31

## 2022-08-31 RX ORDER — PREDNISONE 20 MG/1
40 TABLET ORAL DAILY
Qty: 10 TABLET | Refills: 0 | Status: SHIPPED | OUTPATIENT
Start: 2022-09-01 | End: 2022-09-06

## 2022-08-31 RX ORDER — DEXAMETHASONE SODIUM PHOSPHATE 10 MG/ML
10 INJECTION, SOLUTION INTRAMUSCULAR; INTRAVENOUS ONCE
Status: COMPLETED | OUTPATIENT
Start: 2022-08-31 | End: 2022-08-31

## 2022-08-31 RX ORDER — DIPHENHYDRAMINE HYDROCHLORIDE 50 MG/ML
25 INJECTION INTRAMUSCULAR; INTRAVENOUS ONCE
Status: COMPLETED | OUTPATIENT
Start: 2022-08-31 | End: 2022-08-31

## 2022-08-31 RX ORDER — EPINEPHRINE 0.3 MG/.3ML
0.3 INJECTION SUBCUTANEOUS ONCE
Qty: 0.3 ML | Refills: 0 | Status: SHIPPED | OUTPATIENT
Start: 2022-08-31 | End: 2022-10-18

## 2022-08-31 RX ADMIN — DEXAMETHASONE SODIUM PHOSPHATE 10 MG: 10 INJECTION, SOLUTION INTRAMUSCULAR; INTRAVENOUS at 16:49

## 2022-08-31 RX ADMIN — DIPHENHYDRAMINE HYDROCHLORIDE 25 MG: 50 INJECTION INTRAMUSCULAR; INTRAVENOUS at 16:48

## 2022-08-31 NOTE — DISCHARGE INSTRUCTIONS
Please follow up with PCP. Return the ED or call 911 if you develop worsening swelling, throat swelling, tongue swelling, shortness of breath, chest pain, drooling.

## 2022-08-31 NOTE — ED PROVIDER NOTES
eMERGENCY dEPARTMENT eNCOUnter   [address] [place] Name: Amy Rico  MRN: [id number]  [place] 1985  Date of evaluation: 8/31/22     Amy Rico is a 30 y.o. female with CC: Facial Swelling and Oral Swelling      Based on the medical record the care appears appropriate. I was personally available for consultation in the Emergency Department. The care is provided during an unprecedented national emergency due to the novel coronavirus, COVID 19.     Nirali Dyer DO  Attending Emergency Physician                 Nirali Dyer DO  08/31/22 6901

## 2022-08-31 NOTE — ED PROVIDER NOTES
[address]  eMERGENCY dEPARTMENT eNCOUnter      Pt Name: Silvestre Castle  MRN: [id number]  [place] 1985  Date of evaluation: 8/31/2022  Provider: Debbie Arango PA-C    CHIEF COMPLAINT       Chief Complaint   Patient presents with    Facial Swelling    Oral Swelling           HISTORY OF PRESENT ILLNESS  (Location/Symptom, Timing/Onset, Context/Setting, Quality, Duration, Modifying Factors, Severity.)   Silvestre Castle is a [address] y.o. female who presents to the emergency department for evaluation of facial swelling, lip swelling, and sensation that something is stuck in throat. Began this morning. Pt reports some shortness of breath. Denies tongue swelling, chest pain, nausea, emesis, abd pain, fevers. She is able tolerate PO states it just feels like it sits in her throat. She denies any new exposure to medications, animals, environment. Pt is not on ace inhibitors. No other complaints. Has been to the ED for facial swelling before. No hx of anaphylaxis. No fam hx of angioedema. Nursing Notes were reviewed. REVIEW OF SYSTEMS    (2-9 systems for level 4, 10 or more for level 5)     Review of Systems   Facial swelling  Lip swelling      Except as noted above the remainder of the review of systems was reviewed and negative.        PAST MEDICAL HISTORY     Past Medical History:   Diagnosis Date    History of heroin use 9/16/2020     None otherwise stated in nurses notes    SURGICAL HISTORY       Past Surgical History:   Procedure Laterality Date    TUBAL LIGATION       None otherwise stated in nurses notes    CURRENT MEDICATIONS       Discharge Medication List as of 8/31/2022  5:56 PM        CONTINUE these medications which have NOT CHANGED    Details   pantoprazole (PROTONIX) 20 MG tablet Take 1 tablet by mouth daily, Disp-30 tablet, R-0Normal      ketoconazole (NIZORAL) 2 % cream APPLY TOPICALLY 2 TIMES DAILY FOR 2-4 WEEKS, Disp-60 g, R-3, Normal      !! sertraline (ZOLOFT) 50 MG tablet TAKE 1 (EACH) TAB BY MOUTH DAILY; TAKE WITH 100MG DOSE TO EQUAL 150MG TOTAL. Historical Med      REXULTI 0.5 MG TABS tablet ONE (EACH) TABLET BY MOUTH DAILY;, DAWHistorical Med      !! sertraline (ZOLOFT) 100 MG tablet 1 (EACH) DAILY IN THE MORNING; Historical Med      fluticasone (FLONASE) 50 MCG/ACT nasal spray 2 sprays by Nasal route daily, Disp-1 Bottle, R-0Normal      albuterol sulfate HFA (PROAIR HFA) 108 (90 Base) MCG/ACT inhaler Inhale 2 puffs into the lungs every 4 hours as needed for Wheezing, Disp-1 Inhaler, R-3Normal      bumetanide (BUMEX) 1 MG tablet Take 1 tablet by mouth 2 times daily, Disp-30 tablet, R-11Normal      ferrous sulfate (IRON 325) 325 (65 Fe) MG tablet TAKE 1 TABLET BY MOUTH 3 TIMES DAILY (WITH MEALS), Disp-90 tablet, R-2Normal      Masks (CLEVER CHOICE FACE MASK) [facility] USE NEW FACE MASK EVERYDAY WHEN PATIENT GO OUTSIDE OF HOME DUE TO COVID PANDEMIC, Disp-90 each, R-3Normal      QUETIAPINE FUMARATE PO Take 75 mg by mouth nightly Historical Med      prazosin (MINIPRESS) 1 MG capsule Take 1 capsule by mouth nightly Historical Med      methadone (DOLOPHINE) 10 MG tablet Take 120 mg by mouth daily. Historical Med       !! - Potential duplicate medications found. Please discuss with provider. ALLERGIES     Famotidine    FAMILY HISTORY     History reviewed. No pertinent family history. No family status information on file. None otherwise stated in nurses notes    SOCIAL HISTORY      reports that she has been smoking cigarettes. She has been smoking an average of .5 packs per day. She has never used smokeless tobacco. She reports that she does not drink alcohol and does not use drugs.    lives at home with others     PHYSICAL EXAM    (up to 7 for level 4, 8 or more for level 5)     ED Triage Vitals [08/31/22 1550]   BP Temp Temp src Heart Rate Resp SpO2 Height Weight   (!) 143/72 98.3 °F (36.8 °C) -- 82 18 99 % 5' (1.524 m) (!) 309 lb (140.2 kg)       Physical Exam   Nursing note and vitals reviewed. Constitutional: Oriented to person, place, and time and well-developed, well-nourished. Head: Normocephalic and atraumatic. Mild swelling over lower lip and cheeks. Ear: External ears normal.   Nose: Nose normal and midline. Eyes: Conjunctivae and EOM are normal. Pupils are equal, round, and reactive to light. Neck: Normal range of motion. Neck supple. Throat: Posterior pharynx is without erythema or exudates, airway is patent, no swelling. No tongue swelling. No angioedema. Controlling secretions. No ludwigs. Normal phonation. Cardiovascular: Normal rate, regular rhythm, normal heart sounds and intact distal pulses. Pulmonary/Chest: Effort normal and breath sounds normal. No respiratory distress. No wheezes. No rales. No chest tenderness. Abdominal: Soft. No distension and no mass. There is no tenderness. There is no rebound and no guarding. Musculoskeletal: Normal range of motion. Neurological: Alert and oriented to person, place, and time. GCS score is 15. Skin: Skin is warm and dry. No rash noted. No erythema. No pallor. Psychiatric: Mood, memory, affect and judgment normal.           DIAGNOSTIC RESULTS     EKG: All EKG's are interpreted by the Emergency Department Physician who either signs or Co-signs this chart in the absence of a cardiologist.        RADIOLOGY:   All plain film, CT, MRI, and formal ultrasound images (except ED bedside ultrasound) are read by the radiologist, see reports below, unless otherwise noted in MDM or here. No orders to display       CT ABDOMEN PELVIS W IV CONTRAST Additional Contrast? None    Result Date: 8/21/2022  EXAMINATION: CT OF THE ABDOMEN AND PELVIS WITH CONTRAST 8/21/2022 1:49 pm TECHNIQUE: CT of the abdomen and pelvis was performed with the administration of intravenous contrast. Multiplanar reformatted images are provided for review.  Automated exposure control, iterative reconstruction, and/or weight based adjustment of the mA/kV was utilized to reduce the radiation dose to as low as reasonably achievable. COMPARISON: 10/20/2020 HISTORY: ORDERING SYSTEM PROVIDED HISTORY: left flank pain with diffuse radiation, no BM x 4 days TECHNOLOGIST PROVIDED HISTORY: left flank pain with diffuse radiation, no BM x 4 days Decision Support Exception - unselect if not a suspected or confirmed emergency medical condition->Emergency Medical Condition (MA) Reason for Exam: left flank pain with diffuse radiation, no BM x 4 days Additional signs and symptoms: Pt states lower abdominal/pelvic pain. No surgical hx FINDINGS: Lower Chest: Unremarkable Organs: The liver, gallbladder, pancreas and spleen appear normal.  The adrenal glands and kidneys appear normal.  There are no renal calculi and there is no evidence of hydronephrosis. GI/Bowel: The stomach, small bowel loops and colon appear normal.  There is a normal amount of fecal material in the colon. The appendix appear unremarkable. Pelvis: The uterus, bladder and the rectum appear normal.  There is no pelvic adenopathy. Peritoneum/Retroperitoneum: Unremarkable Bones/Soft Tissues: Unremarkable     No acute process identified. LABS:  Labs Reviewed - No data to display    All other labs were within normal range or not returned as of this dictation.     EMERGENCY DEPARTMENT COURSE and DIFFERENTIAL DIAGNOSIS/MDM:   Vitals:    Vitals:    08/31/22 1550 08/31/22 1645 08/31/22 1653 08/31/22 1806   BP: (!) 143/72 134/81 134/81 129/69   Pulse: 82  84 76   Resp: 18  20 20   Temp: 98.3 °F (36.8 °C)      SpO2: 99% 98% 100% 100%   Weight: (!) 309 lb (140.2 kg)      Height: 5' (1.524 m)            Patient instructed to return to the emergency room if symptoms worsen, return, or any other concern right away which is agreed by the patient    ED MEDS:  Orders Placed This Encounter   Medications    diphenhydrAMINE (BENADRYL) injection 25 mg    dexamethasone (PF) (DECADRON) injection 10 mg predniSONE (DELTASONE) 20 MG tablet     Sig: Take 2 tablets by mouth daily for 5 days     Dispense:  10 tablet     Refill:  0    diphenhydrAMINE (BENADRYL) 50 MG capsule     Sig: Take 1 capsule by mouth every 6 hours as needed for Itching     Dispense:  30 capsule     Refill:  0    EPINEPHrine (EPIPEN 2-RAMEZ) 0.3 MG/0.3ML SOAJ injection     Sig: Inject 0.3 mLs into the skin once for 1 dose Use as directed for allergic reaction     Dispense:  0.3 mL     Refill:  0         CONSULTS:  None    PROCEDURES:  None      FINAL IMPRESSION      1. Facial swelling          DISPOSITION/PLAN   DISPOSITION Decision To Discharge    PATIENT REFERRED TO:  Jojo Manzano MD  [address] [facility] [address]  [phone]          [facility]  [facility] [address]  [phone]        DISCHARGE MEDICATIONS:  Discharge Medication List as of 8/31/2022  5:56 PM        START taking these medications    Details   predniSONE (DELTASONE) 20 MG tablet Take 2 tablets by mouth daily for 5 days, Disp-10 tablet, R-0Print      diphenhydrAMINE (BENADRYL) 50 MG capsule Take 1 capsule by mouth every 6 hours as needed for Itching, Disp-30 capsule, R-0Print      EPINEPHrine (EPIPEN 2-RAMEZ) 0.3 MG/0.3ML SOAJ injection Inject 0.3 mLs into the skin once for 1 dose Use as directed for allergic reaction, Disp-0.3 mL, R-0Print               Summation      Patient Course:     Facial swelling. Began this morning. Sensation of FB in throat. Tolerating PO. No known exposures. On exam, mild lower lip swelling, cheek swelling. Lungs are clear. No stridor. Tolerating PO. Normal phonation. Controlling secretions. Pt given decadron and benadryl. Watched for an hour. On re-exam, pt is sleeping. Pt states she is feeling much better after medications. No longer having sensation in throat. No worsening of swelling. Seems to have improved. Pt is ok for dc home. Will dc home with prednisone, benadryl, and epipen.

## 2022-08-31 NOTE — ED TRIAGE NOTES
Mode of arrival (squad #, walk in, police, etc) : walk in        Chief complaint(s): Oral swelling, facial swelling        Arrival Note (brief scenario, treatment PTA, etc). : Pt states she developed swelling approx 1 hour PTA. Pt denies any exposure to known allergens. C= \"Have you ever felt that you should Cut down on your drinking? \"  No  A= \"Have people Annoyed you by criticizing your drinking? \"  No  G= \"Have you ever felt bad or Guilty about your drinking? \"  No  E= \"Have you ever had a drink as an Eye-opener first thing in the morning to steady your nerves or to help a hangover? \"  No      Deferred []      Reason for deferring: N/A    *If yes to two or more: probable alcohol abuse. *

## 2022-10-18 ENCOUNTER — OFFICE VISIT (OUTPATIENT)
Dept: INTERNAL MEDICINE CLINIC | Age: 37
End: 2022-10-18
Payer: MEDICAID

## 2022-10-18 VITALS
DIASTOLIC BLOOD PRESSURE: 82 MMHG | BODY MASS INDEX: 57.52 KG/M2 | HEART RATE: 90 BPM | HEIGHT: 60 IN | WEIGHT: 293 LBS | OXYGEN SATURATION: 100 % | SYSTOLIC BLOOD PRESSURE: 130 MMHG

## 2022-10-18 DIAGNOSIS — F11.988 OPIOID USE, UNSPECIFIED WITH OTHER OPIOID-INDUCED DISORDER (HCC): ICD-10-CM

## 2022-10-18 DIAGNOSIS — F33.42 RECURRENT MAJOR DEPRESSIVE DISORDER, IN FULL REMISSION (HCC): ICD-10-CM

## 2022-10-18 DIAGNOSIS — B35.3 TINEA PEDIS OF BOTH FEET: ICD-10-CM

## 2022-10-18 DIAGNOSIS — R22.42 LOCALIZED SWELLING, MASS AND LUMP, LOWER LIMB, LEFT: ICD-10-CM

## 2022-10-18 DIAGNOSIS — T78.40XD ALLERGIC REACTION, SUBSEQUENT ENCOUNTER: ICD-10-CM

## 2022-10-18 DIAGNOSIS — M79.89 LEG SWELLING: Primary | ICD-10-CM

## 2022-10-18 DIAGNOSIS — E66.01 MORBID OBESITY WITH BMI OF 60.0-69.9, ADULT (HCC): ICD-10-CM

## 2022-10-18 DIAGNOSIS — R06.02 SHORTNESS OF BREATH: ICD-10-CM

## 2022-10-18 PROCEDURE — 99214 OFFICE O/P EST MOD 30 MIN: CPT | Performed by: INTERNAL MEDICINE

## 2022-10-18 PROCEDURE — G8484 FLU IMMUNIZE NO ADMIN: HCPCS | Performed by: INTERNAL MEDICINE

## 2022-10-18 PROCEDURE — G8427 DOCREV CUR MEDS BY ELIG CLIN: HCPCS | Performed by: INTERNAL MEDICINE

## 2022-10-18 PROCEDURE — G8417 CALC BMI ABV UP PARAM F/U: HCPCS | Performed by: INTERNAL MEDICINE

## 2022-10-18 PROCEDURE — 4004F PT TOBACCO SCREEN RCVD TLK: CPT | Performed by: INTERNAL MEDICINE

## 2022-10-18 RX ORDER — TRAZODONE HYDROCHLORIDE 50 MG/1
TABLET ORAL
COMMUNITY
Start: 2022-08-05

## 2022-10-18 RX ORDER — BREXPIPRAZOLE 1 MG/1
TABLET ORAL
COMMUNITY
Start: 2022-08-08

## 2022-10-18 RX ORDER — QUETIAPINE FUMARATE 200 MG/1
TABLET, FILM COATED ORAL
COMMUNITY
Start: 2022-08-15

## 2022-10-18 RX ORDER — FLUCONAZOLE 150 MG/1
150 TABLET ORAL WEEKLY
Qty: 4 TABLET | Refills: 0 | Status: SHIPPED | OUTPATIENT
Start: 2022-10-18

## 2022-10-18 RX ORDER — BUMETANIDE 2 MG/1
2 TABLET ORAL DAILY
Qty: 30 TABLET | Refills: 3 | Status: SHIPPED | OUTPATIENT
Start: 2022-10-18

## 2022-10-18 RX ORDER — PRAZOSIN HYDROCHLORIDE 2 MG/1
CAPSULE ORAL
COMMUNITY
Start: 2022-08-05

## 2022-10-18 RX ORDER — SERTRALINE HYDROCHLORIDE 25 MG/1
TABLET, FILM COATED ORAL
COMMUNITY
Start: 2022-08-15

## 2022-10-18 SDOH — ECONOMIC STABILITY: FOOD INSECURITY: WITHIN THE PAST 12 MONTHS, THE FOOD YOU BOUGHT JUST DIDN'T LAST AND YOU DIDN'T HAVE MONEY TO GET MORE.: NEVER TRUE

## 2022-10-18 SDOH — ECONOMIC STABILITY: FOOD INSECURITY: WITHIN THE PAST 12 MONTHS, YOU WORRIED THAT YOUR FOOD WOULD RUN OUT BEFORE YOU GOT MONEY TO BUY MORE.: NEVER TRUE

## 2022-10-18 ASSESSMENT — SOCIAL DETERMINANTS OF HEALTH (SDOH): HOW HARD IS IT FOR YOU TO PAY FOR THE VERY BASICS LIKE FOOD, HOUSING, MEDICAL CARE, AND HEATING?: NOT HARD AT ALL

## 2022-10-18 ASSESSMENT — PATIENT HEALTH QUESTIONNAIRE - PHQ9
8. MOVING OR SPEAKING SO SLOWLY THAT OTHER PEOPLE COULD HAVE NOTICED. OR THE OPPOSITE, BEING SO FIGETY OR RESTLESS THAT YOU HAVE BEEN MOVING AROUND A LOT MORE THAN USUAL: 0
1. LITTLE INTEREST OR PLEASURE IN DOING THINGS: 1
3. TROUBLE FALLING OR STAYING ASLEEP: 0
SUM OF ALL RESPONSES TO PHQ QUESTIONS 1-9: 3
2. FEELING DOWN, DEPRESSED OR HOPELESS: 1
6. FEELING BAD ABOUT YOURSELF - OR THAT YOU ARE A FAILURE OR HAVE LET YOURSELF OR YOUR FAMILY DOWN: 1
SUM OF ALL RESPONSES TO PHQ QUESTIONS 1-9: 3
10. IF YOU CHECKED OFF ANY PROBLEMS, HOW DIFFICULT HAVE THESE PROBLEMS MADE IT FOR YOU TO DO YOUR WORK, TAKE CARE OF THINGS AT HOME, OR GET ALONG WITH OTHER PEOPLE: 0
SUM OF ALL RESPONSES TO PHQ9 QUESTIONS 1 & 2: 2
9. THOUGHTS THAT YOU WOULD BE BETTER OFF DEAD, OR OF HURTING YOURSELF: 0
SUM OF ALL RESPONSES TO PHQ QUESTIONS 1-9: 3
SUM OF ALL RESPONSES TO PHQ QUESTIONS 1-9: 3
4. FEELING TIRED OR HAVING LITTLE ENERGY: 0
7. TROUBLE CONCENTRATING ON THINGS, SUCH AS READING THE NEWSPAPER OR WATCHING TELEVISION: 0
5. POOR APPETITE OR OVEREATING: 0

## 2022-10-18 NOTE — PROGRESS NOTES
Visit Information    Have you changed or started any medications since your last visit including any over-the-counter medicines, vitamins, or herbal medicines? no   Are you having any side effects from any of your medications? -  no  Have you stopped taking any of your medications? Is so, why? -  yes - per ER stop protonix    Have you seen any other physician or provider since your last visit? No  Have you had any other diagnostic tests since your last visit? No  Have you been seen in the emergency room and/or had an admission to a hospital since we last saw you? Yes - Records Obtained  Have you had your routine dental cleaning in the past 6 months? no    Have you activated your [employer] account? If not, what are your barriers? no    Patient Care Team:  Shola Ibarra MD as PCP - General (Internal Medicine)  Shola Ibarra MD as PCP - [facility] Provider    Medical History Review  Past Medical, Family, and Social History reviewed and does not contribute to the patient presenting condition    Health Maintenance   Topic Date Due    Varicella vaccine (1 of 2 - 2-dose childhood series) Never done    DTaP/Tdap/Td vaccine (1 - Tdap) Never done    Cervical cancer screen  Never done    COVID-19 Vaccine (3 - Booster for [employer] series) 02/22/2022    Depression Monitoring  03/22/2022    Flu vaccine (1) 08/01/2022    Pneumococcal 0-64 years Vaccine  Completed    Hepatitis C screen  Completed    HIV screen  Completed    Hepatitis A vaccine  Aged Out    Hib vaccine  Aged Out    Meningococcal (ACWY) vaccine  Aged Out     SUBJECTIVE:  Aimnah Hua is a 30 y.o. female patient who  comes for complaints of   Chief Complaint   Patient presents with    Leg Swelling     Pt c/o leg swelling that is worsening on the LT side. Pt also c/o lump on LT ankle. Pt states leg and ankle are painful to touch.        Leg swelling  years  Worsenign over last few mth  Now more painful      Was seen in ER 8/31  For face sweling- treated as allertic reaction  With benadryl and decadron with improvnet in symptoms    Pt stopped taking lasix and bumex   CT abdo pelvis NAD 8/22      REVIEW OF SYSTEMS (except Subjective (HPI))  GENERAL: No fevers / chills  RESPIRATORY: Negative for cough, wheezing or shortness of breath  CARDIOVASCULAR: Negative for chest pain or palpitations.   GI: no nausea, vomiting, or diarrhea  NEURO: No history of headaches    Past Medical History:   Diagnosis Date    History of heroin use 9/16/2020       SOCIAL HISTORY:  Social History     Socioeconomic History    Marital status: Single     Spouse name: Not on file    Number of children: Not on file    Years of education: Not on file    Highest education level: Not on file   Occupational History    Not on file   Tobacco Use    Smoking status: Every Day     Packs/day: 0.50     Types: Cigarettes    Smokeless tobacco: Never   Vaping Use    Vaping Use: Never used   Substance and Sexual Activity    Alcohol use: No    Drug use: No     Comment: on Methadone    Sexual activity: Not on file   Other Topics Concern    Not on file   Social History Narrative    Not on file     Social Determinants of Health     Financial Resource Strain: Low Risk     Difficulty of Paying Living Expenses: Not hard at all   Food Insecurity: No Food Insecurity    Worried About Running Out of Food in the Last Year: Never true    Ran Out of Food in the Last Year: Never true   Transportation Needs: Not on file   Physical Activity: Not on file   Stress: Not on file   Social Connections: Not on file   Intimate Partner Violence: Not on file   Housing Stability: Not on file           CURRENT MEDICATIONS:  Current Outpatient Medications   Medication Sig Dispense Refill    sertraline (ZOLOFT) 25 MG tablet take 3 tablets by mouth daily TO WITH 100 MG DOSE TO EQUAL 175 MG DOSE      traZODone (DESYREL) 50 MG tablet take 1 tablet by mouth at bedtime if needed for sleep / insomnia      prazosin (MINIPRESS) 2 MG capsule take 1 capsule by mouth at bedtime      REXULTI 1 MG TABS tablet take 1 tablet by mouth once daily      QUEtiapine (SEROQUEL) 200 MG tablet take 1 tablet by mouth at bedtime      diphenhydrAMINE (BENADRYL) 50 MG capsule Take 1 capsule by mouth every 6 hours as needed for Itching 30 capsule 0    EPINEPHrine (EPIPEN 2-RAMEZ) 0.3 MG/0.3ML SOAJ injection Inject 0.3 mLs into the skin once for 1 dose Use as directed for allergic reaction 0.3 mL 0    ketoconazole (NIZORAL) 2 % cream APPLY TOPICALLY 2 TIMES DAILY FOR 2-4 WEEKS 60 g 3    sertraline (ZOLOFT) 50 MG tablet TAKE 1 (EACH) TAB BY MOUTH DAILY; TAKE WITH 100MG DOSE TO EQUAL 150MG TOTAL. sertraline (ZOLOFT) 100 MG tablet 1 (EACH) DAILY IN THE MORNING;      albuterol sulfate HFA (PROAIR HFA) 108 (90 Base) MCG/ACT inhaler Inhale 2 puffs into the lungs every 4 hours as needed for Wheezing 1 Inhaler 3    methadone (DOLOPHINE) 10 MG tablet Take 120 mg by mouth daily. pantoprazole (PROTONIX) 20 MG tablet Take 1 tablet by mouth daily (Patient not taking: Reported on 10/18/2022) 30 tablet 0    fluticasone (FLONASE) 50 MCG/ACT nasal spray 2 sprays by Nasal route daily (Patient not taking: Reported on 10/18/2022) 1 Bottle 0    bumetanide (BUMEX) 1 MG tablet Take 1 tablet by mouth 2 times daily (Patient not taking: Reported on 10/18/2022) 30 tablet 11    ferrous sulfate (IRON 325) 325 (65 Fe) MG tablet TAKE 1 TABLET BY MOUTH 3 TIMES DAILY (WITH MEALS) (Patient not taking: Reported on 10/18/2022) 90 tablet 2    Masks (CLEVER CHOICE FACE MASK) [facility] USE NEW FACE MASK EVERYDAY WHEN PATIENT GO OUTSIDE OF HOME DUE TO COVID PANDEMIC (Patient not taking: Reported on 10/18/2022) 90 each 3     No current facility-administered medications for this visit. OBJECTIVE:  Vitals:    10/18/22 1618   BP: 130/82   Pulse: 90   SpO2: 100%     Body mass index is 61.21 kg/m².         Focal exam:  Bilateral leg swelling with skin discoloration skin appears lichenified, dried with white flakes suggestive of dry skin    Possible fungal infection both feet  General exam (except above):  General appearance - well appearing, alert, in no acute distress  Head - Atraumatic, normocephalic  Eyes - EOMI, no jaundice or pallor  Lungs - Lungs clear to auscultation. No wheezing, rhonchi, rales  Heart - RRR without murmur, gallop, or rubs. No ectopy  Abdomen - Abdomen soft, non-tender. Bowel sounds normal. No masses, organomegaly  Extremities -No significant edema, or skin discoloration. Good capillary refill. Neuro - Pt Alert, awake and oriented x 3. No gross focal neurological deficits    ASSESSMENT AND PLAN (MEDICAL DECISION MAKING):     Maryuri Puentes was seen today for leg swelling. Diagnoses and all orders for this visit:    Leg swelling  -     Echocardiogram complete; Future  -     Elastic Bandages & Supports (MEDICAL COMPRESSION STOCKINGS) MISC; 2 each by Does not apply route daily Right and left knee high compression stockings. 30-40 mmHg. To be worn continuously throughout the day. -     bumetanide (BUMEX) 2 MG tablet; Take 1 tablet by mouth daily  -     triamcinolone (KENALOG) 0.1 % ointment; Apply topically 2 times daily for 7 days    Opioid use, unspecified with other opioid-induced disorder ([facility] [address]    Morbid obesity with BMI of 60.0-69.9, adult (HCC)    Recurrent major depressive disorder, in full remission ([facility] [address] [facility]    Shortness of breath  -     Echocardiogram complete; Future    Localized swelling, mass and lump, lower limb, left  -     US EXTREMITY LEFT NON VASC LIMITED; Future    Tinea pedis of both feet  -     fluconazole (DIFLUCAN) 150 MG tablet;  Take 1 tablet by mouth once a week    Allergic reaction, subsequent encounter  -     Belén Eagle MD, Allergy & Immunology, [facility]       Follow up in: 1 week      Nicole Alcantara MD

## 2022-10-24 ENCOUNTER — TELEPHONE (OUTPATIENT)
Dept: INTERNAL MEDICINE CLINIC | Age: 37
End: 2022-10-24

## 2024-12-20 ENCOUNTER — HOSPITAL ENCOUNTER (EMERGENCY)
Age: 39
Discharge: HOME | End: 2024-12-20
Attending: EMERGENCY MEDICINE
Payer: COMMERCIAL

## 2024-12-20 ENCOUNTER — APPOINTMENT (OUTPATIENT)
Dept: GENERAL RADIOLOGY | Age: 39
End: 2024-12-20
Payer: COMMERCIAL

## 2024-12-20 ENCOUNTER — APPOINTMENT (OUTPATIENT)
Dept: CT IMAGING | Age: 39
End: 2024-12-20
Payer: COMMERCIAL

## 2024-12-20 VITALS
SYSTOLIC BLOOD PRESSURE: 151 MMHG | HEART RATE: 82 BPM | TEMPERATURE: 98.2 F | RESPIRATION RATE: 20 BRPM | DIASTOLIC BLOOD PRESSURE: 89 MMHG | OXYGEN SATURATION: 100 %

## 2024-12-20 DIAGNOSIS — M54.2 NECK PAIN: ICD-10-CM

## 2024-12-20 DIAGNOSIS — M79.602 LEFT ARM PAIN: Primary | ICD-10-CM

## 2024-12-20 PROCEDURE — 6360000002 HC RX W HCPCS

## 2024-12-20 PROCEDURE — 96372 THER/PROPH/DIAG INJ SC/IM: CPT

## 2024-12-20 PROCEDURE — 72125 CT NECK SPINE W/O DYE: CPT

## 2024-12-20 PROCEDURE — 73060 X-RAY EXAM OF HUMERUS: CPT

## 2024-12-20 PROCEDURE — 73080 X-RAY EXAM OF ELBOW: CPT

## 2024-12-20 PROCEDURE — 6370000000 HC RX 637 (ALT 250 FOR IP)

## 2024-12-20 PROCEDURE — 73030 X-RAY EXAM OF SHOULDER: CPT

## 2024-12-20 PROCEDURE — 99284 EMERGENCY DEPT VISIT MOD MDM: CPT

## 2024-12-20 RX ORDER — CYCLOBENZAPRINE HCL 10 MG
10 TABLET ORAL 2 TIMES DAILY PRN
Qty: 10 TABLET | Refills: 0 | Status: SHIPPED | OUTPATIENT
Start: 2024-12-20 | End: 2024-12-25

## 2024-12-20 RX ORDER — MORPHINE SULFATE 4 MG/ML
4 INJECTION, SOLUTION INTRAMUSCULAR; INTRAVENOUS
Status: COMPLETED | OUTPATIENT
Start: 2024-12-20 | End: 2024-12-20

## 2024-12-20 RX ORDER — MORPHINE SULFATE 4 MG/ML
4 INJECTION, SOLUTION INTRAMUSCULAR; INTRAVENOUS
Status: DISCONTINUED | OUTPATIENT
Start: 2024-12-20 | End: 2024-12-20

## 2024-12-20 RX ORDER — CYCLOBENZAPRINE HCL 10 MG
10 TABLET ORAL 2 TIMES DAILY PRN
Qty: 10 TABLET | Refills: 0 | Status: SHIPPED | OUTPATIENT
Start: 2024-12-20 | End: 2024-12-20

## 2024-12-20 RX ORDER — ACETAMINOPHEN 500 MG
1000 TABLET ORAL ONCE
Status: COMPLETED | OUTPATIENT
Start: 2024-12-20 | End: 2024-12-20

## 2024-12-20 RX ADMIN — MORPHINE SULFATE 4 MG: 4 INJECTION INTRAVENOUS at 18:15

## 2024-12-20 RX ADMIN — ACETAMINOPHEN 1000 MG: 500 TABLET ORAL at 17:51

## 2024-12-20 ASSESSMENT — ENCOUNTER SYMPTOMS
GASTROINTESTINAL NEGATIVE: 1
RESPIRATORY NEGATIVE: 1

## 2024-12-20 ASSESSMENT — PAIN SCALES - GENERAL: PAINLEVEL_OUTOF10: 10

## 2024-12-20 NOTE — ED NOTES
Pt arrives via triage, pt c/o of left arm injury.   Pt states she lost her balance getting out of her car and fell onto her left arm, pt denies hitting head, denies LOC  denies thinners.   PMS intact. Pt sates the arm seems to continue swelling.   Pt is A+Ox4, call light in reach

## 2024-12-20 NOTE — ED PROVIDER NOTES
Psychiatric/Behavioral: Negative.         PHYSICAL EXAM      INITIAL VITALS:   BP (!) 151/89   Pulse 82   Temp 98.2 °F (36.8 °C)   Resp 20   SpO2 100%     Physical Exam  Vitals and nursing note reviewed.   Constitutional:       General: She is not in acute distress.     Appearance: Normal appearance.   HENT:      Head: Normocephalic and atraumatic.   Eyes:      Extraocular Movements: Extraocular movements intact.      Pupils: Pupils are equal, round, and reactive to light.   Neck:      Comments: C-collar applied  Cardiovascular:      Rate and Rhythm: Normal rate and regular rhythm.      Pulses: Normal pulses.      Heart sounds: Normal heart sounds.   Pulmonary:      Effort: Pulmonary effort is normal. No respiratory distress.      Breath sounds: Normal breath sounds.   Abdominal:      General: Abdomen is flat. Bowel sounds are normal. There is no distension.      Tenderness: There is no abdominal tenderness.   Musculoskeletal:         General: Tenderness and signs of injury present. No deformity. Normal range of motion.      Cervical back: Neck supple. Tenderness present.   Lymphadenopathy:      Cervical: No cervical adenopathy.   Skin:     General: Skin is warm and dry.      Findings: Abrasion (Superficial abrasion without bleeding to left elbow) present.   Neurological:      General: No focal deficit present.      Mental Status: She is alert and oriented to person, place, and time.      Sensory: No sensory deficit.   Psychiatric:         Mood and Affect: Mood normal.         Behavior: Behavior normal.           DDX/DIAGNOSTIC RESULTS / EMERGENCY DEPARTMENT COURSE / MDM     Medical Decision Making  This is a 39-year-old female presenting to the emergency department after a fall from approximately 2 feet above ground.  She states that she was climbing into her truck and was using the sidestep to enter the  side when her foot slipped and she fell to the concrete ground.  She denies loss of consciousness,  cardiologist.    EMERGENCY DEPARTMENT COURSE:    ED Course as of 12/21/24 1130   Fri Dec 20, 2024   1750 C-Collar applied for concern of cervical injury.     CT Cspine ordered along with LUE XR for eval.     Tylenol and morphine ordered for analgesia. Pt stated she is on methadone for previous \"issues in the past with pain meds\" [MW]   1928 XR SHOULDER LEFT (MIN 2 VIEWS) [MW]   1928 XR HUMERUS LEFT (MIN 2 VIEWS) [MW]   1928 XR ELBOW LEFT (MIN 3 VIEWS)  IMPRESSION:  No acute osseous abnormality of the left shoulder, left humerus and left  elbow.      [MW]   2017 CT CSpine W/O Contrast  IMPRESSION:  No acute abnormality of the cervical spine.   [MW]      ED Course User Index  [MW] Saroj Crews DO   Patient discharged to home with prescription for Flexeril for 5 days.  Return precautions and proper follow-up also given.  Cervical spine was cleared in the emergency department by myself with no midline tenderness and minor trapezius discomfort with range of motion testing.    PROCEDURES:      CONSULTS:  None    CRITICAL CARE:  There was significant risk of life threatening deterioration of patient's condition requiring my direct management. Critical care time 0 minutes, excluding any documented procedures.    FINAL IMPRESSION      1. Left arm pain    2. Neck pain          DISPOSITION / PLAN     DISPOSITION Decision To Discharge 12/20/2024 08:34:22 PM   DISPOSITION CONDITION Stable           PATIENT REFERRED TO:  Kasandra Gaona MD  [address] [facility] [address]  [phone]    Schedule an appointment as soon as possible for a visit in 3 days  If symptoms worsen, As needed      DISCHARGE MEDICATIONS:  Discharge Medication List as of 12/20/2024  8:37 PM          Saroj Crews DO  Emergency Medicine Resident    (Please note that portions of this note were completed with a voice recognition program.  Efforts were made to edit the dictations but occasionally words are mis-transcribed.)

## 2024-12-20 NOTE — ED PROVIDER NOTES
[facility]  FACULTY HANDOFF       Handoff taken on the following patient from prior Attending Physician: Dr. Boyd  Pt Name: Vannesa Dasilva  PCP:  Kasandra Gaona MD  6:48 PM EST    Attestation  I was available and discussed any additional care issues that arose and coordinated the management plans with the resident(s) caring for the patient during my duty period. Any areas of disagreement with resident's documentation of care or procedures are noted on the chart. I was personally present for the key portions of any/all procedures during my duty period. I have documented in the chart those procedures where I was not present during the key portions.         CHIEF COMPLAINT       Chief Complaint   Patient presents with    Arm Injury     Left         CURRENT MEDICATIONS     Previous Medications  Previous Medications    ALBUTEROL SULFATE HFA (PROAIR HFA) 108 (90 BASE) MCG/ACT INHALER    Inhale 2 puffs into the lungs every 4 hours as needed for Wheezing    BUMETANIDE (BUMEX) 2 MG TABLET    Take 1 tablet by mouth daily    DIPHENHYDRAMINE (BENADRYL) 50 MG CAPSULE    Take 1 capsule by mouth every 6 hours as needed for Itching    ELASTIC BANDAGES & SUPPORTS (MEDICAL COMPRESSION STOCKINGS) MISC    2 each by Does not apply route daily Right and left knee high compression stockings.  30-40 mmHg.  To be worn continuously throughout the day.    EPINEPHRINE (EPIPEN 2-RAMEZ) 0.3 MG/0.3ML SOAJ INJECTION    Inject 0.3 mLs into the skin once for 1 dose Use as directed for allergic reaction    FERROUS SULFATE (IRON 325) 325 (65 FE) MG TABLET    TAKE 1 TABLET BY MOUTH 3 TIMES DAILY (WITH MEALS)    FLUCONAZOLE (DIFLUCAN) 150 MG TABLET    Take 1 tablet by mouth once a week    FLUTICASONE (FLONASE) 50 MCG/ACT NASAL SPRAY    2 sprays by Nasal route daily    KETOCONAZOLE (NIZORAL) 2 % CREAM    APPLY TOPICALLY 2 TIMES DAILY FOR 2-4 WEEKS    MASKS (CLEVER CHOICE FACE MASK) MISC    USE NEW FACE MASK EVERYDAY WHEN

## 2024-12-20 NOTE — ED PROVIDER NOTES
[facility]     Emergency Department     Faculty Attestation    I performed a history and physical examination of the patient and discussed management with the resident. I reviewed the resident’s note and agree with the documented findings and plan of care. Any areas of disagreement are noted on the chart. I was personally present for the key portions of any procedures. I have documented in the chart those procedures where I was not present during the key portions. I have reviewed the emergency nurses triage note. I agree with the chief complaint, past medical history, past surgical history, allergies, medications, social and family history as documented unless otherwise noted below. Documentation of the HPI, Physical Exam and Medical Decision Making performed by medical students or scribes is based on my personal performance of the HPI, PE and MDM. For Physician Assistant/ Nurse Practitioner cases/documentation I have personally evaluated this patient and have completed at least one if not all key elements of the E/M (history, physical exam, and MDM). Additional findings are as noted.    Vital signs:   Vitals:    12/20/24 1637   BP: (!) 151/89   Pulse: 82   Temp: 98.2 °F (36.8 °C)   SpO2: 100%      39-year-old female presents complaining of left arm pain after falling while trying to get into her truck.  Patient reports pain over her left proximal humerus and her left elbow.  She also has pain over the left clavicle, as well as midline tenderness to her cervical spine.  Plan for CT cervical spine, x-ray of clavicle, humerus, elbow.            Cristin Boyd M.D,  Attending Emergency  Physician            Cristin Boyd MD  12/20/24 2566

## 2024-12-21 NOTE — DISCHARGE INSTRUCTIONS
You need to call Kasandra Gaona MD to make an appointment as directed for follow up.    Should you have any questions regarding your care or further treatment, please call [facility] Emergency Department at [phone].    Take any medications as prescribed, if given any, otherwise for pain Use ibuprofen or Tylenol (unless prescribed medications that have Tylenol in it).  You can take over the counter Ibuprofen (advil) tablets (4 tablets every 8 hours or 3 tablets every 6 hours or 2 tablets every 4 hours)    If given narcotics during this ED visit, please do not drive or operate heavy machinery for at least 4-6 hours.     PLEASE RETURN TO THE ED IMMEDIATELY for worsening symptoms, or if you develop any concerning symptoms such as: high fever not relieved by tylenol and/or motrin, chills, shortness of breath, chest pain, persistent nausea and/or vomiting, numbness, weakness or tingling in the arms or legs or change in color of the extremities, changes in mental status, persistent headache, blurry vision, inability to urinate, unable to follow up with your physician, or other any other  Care or concern.

## 2025-06-13 NOTE — TELEPHONE ENCOUNTER
Continued Stay Review    Date: 6/13/25                          Current Patient Class: Inpatient  Current Level of Care: MS    HPI:78 y.o. male initially admitted on 6/10/25   Current Diagnosis: Sepsis due to Pneumonia. Acute resp failure w/ hypoxia.  HTN urgency.     6/13/25 Assessment/Plan:   Patient appears in no acute distress, seated in his chair and eating breakfast. However, he reports dizziness, lightheadedness, headache, racing heart, and ambulatory instability. On exam, Decrease breath sounds present, Pt on 2L NC @ 94%. T- 99.5 this morning. Pt tachycardic and tachypneic. Abnormal labs: WBC 10.96, H/H 10.8/34.9. Plan: Continue IV Ceftriaxone and Azithromycin, Mucinex, Continue PTA Amlodipine, Lisinopril, Metoprolol, Labetalol PRN, Monitor BP. Wean O2 as able. CXR today. Therapy recommending home w/ HHC services.      Certification Statement: The patient will continue to require additional inpatient hospital stay due to continuing to try to wean oxygen, continuing IV antibiotics for CAP, repeat labs in the a.m., also weaker than baseline will have PT OT evaluate patient .    Medications:   Scheduled Medications:  amLODIPine, 5 mg, Oral, Daily  apixaban, 5 mg, Oral, BID  azithromycin, 500 mg, Oral, Q24H  cefTRIAXone, 1,000 mg, Intravenous, Q24H  famotidine, 20 mg, Oral, BID  flecainide, 50 mg, Oral, BID  guaiFENesin, 1,200 mg, Oral, BID  latanoprost, 1 drop, Both Eyes, HS  lidocaine, 1 patch, Topical, Daily  lisinopril, 10 mg, Oral, Daily  metoprolol succinate, 25 mg, Oral, Daily  multivitamin-minerals, 1 tablet, Oral, Daily  pravastatin, 20 mg, Oral, Daily  tamsulosin, 0.4 mg, Oral, Daily With Dinner      Continuous IV Infusions: NONE     PRN Meds:  acetaminophen, 650 mg, Oral, Q6H PRN- given x1 6/13  albuterol, 2.5 mg, Nebulization, Q4H PRN  labetalol, 10 mg, Intravenous, Q4H PRN  ondansetron, 4 mg, Intravenous, Q6H PRN  polyethylene glycol, 17 g, Oral, Daily PRN      Discharge Plan: TBD    Vital Signs  Patient will need to call their insurance and see who is covered. Updated referral can be placed at that time. (last 3 days)       Date/Time Temp Pulse Resp BP MAP (mmHg) SpO2 Calculated FIO2 (%) - Nasal Cannula O2 Flow Rate (L/min) Nasal Cannula O2 Flow Rate (L/min) O2 Device Patient Position - Orthostatic VS [place] Coma Scale Score Pain    06/13/25 1506 -- 81 26 -- -- 96 % 24 -- 1 L/min  Nasal cannula -- -- --    06/13/25 1500 97.6 °F (36.4 °C) -- -- 144/65 94 -- -- -- -- -- Sitting -- --    06/13/25 1100 97.3 °F (36.3 °C) -- -- -- -- -- 28 -- 2 L/min Nasal cannula Sitting -- --    06/13/25 1030 -- -- -- -- -- -- -- -- -- -- -- -- 5    06/13/25 0900 -- 100 28 158/74 106 94 % -- -- -- -- Lying -- --    06/13/25 0847 -- -- -- -- -- -- -- -- -- -- -- -- Med Not Given for Pain - for MAR use only    06/13/25 0700 99.5 °F (37.5 °C) 105 44 171/91 124 92 % 28 -- 2 L/min Nasal cannula Lying -- --    06/13/25 0400 99 °F (37.2 °C) 87 18 161/76 109 96 % -- -- -- -- -- -- --    06/13/25 0300 -- 84 27 -- -- 95 % -- -- -- -- -- -- --    06/13/25 0000 -- 82 18 114/70 87 94 % -- -- -- -- -- -- --    06/12/25 2200 -- 87 20 141/75 102 96 % -- -- -- -- -- -- --    06/12/25 2000 -- 94 -- -- -- -- -- -- -- -- -- 15 No Pain    06/12/25 1721 -- -- -- -- -- -- -- -- -- -- -- -- 3    06/12/25 1500 99.1 °F (37.3 °C) 96 36 147/70 100 94 % -- -- -- Nasal cannula Sitting -- --    06/12/25 1200 -- 85 24 145/71 100 95 % -- -- -- -- -- -- --    06/12/25 1142 97.8 °F (36.6 °C) 83 31 136/66 92 95 % -- -- -- Nasal cannula Sitting -- --    06/12/25 0820 -- -- -- -- -- -- -- -- -- -- -- -- 2    06/12/25 0800 -- 86 22 164/77 110 95 % -- -- -- -- -- 15 --    06/12/25 0759 -- -- -- -- -- 95 % 28 -- 2 L/min Nasal cannula -- -- --    06/12/25 0730 97.2 °F (36.2 °C) 79 25 102/52 74 97 % 28 -- 2 L/min Nasal cannula Lying -- No Pain    06/12/25 0600 -- 78 31 120/63 85 97 % -- -- -- -- -- -- --    06/12/25 0500 96.9 °F (36.1 °C) 84 33 -- -- 98 % -- -- -- -- -- -- --    06/12/25 0400 -- 74 22 97/53 70 98 % -- -- -- -- -- -- --    06/12/25 0300 -- 77 25 -- -- 97 % -- --  -- -- -- -- --    06/12/25 0200 -- 81 27 107/54 78 97 % -- -- -- -- -- -- --    06/12/25 0101 -- 92 32 -- -- 91 % -- -- -- -- -- -- --    06/12/25 0100 -- 91 35 -- -- 92 % -- -- -- -- -- -- --    06/12/25 0001 -- 109 30 189/81 117 88 % -- -- -- -- -- -- 3    06/11/25 2320 -- 114 42 205/88 127 92 % -- -- -- -- -- -- --    06/11/25 2300 -- 112 37 193/88 126 90 % -- -- -- -- -- -- --    06/11/25 2200 -- 105 30 139/78 104 91 % -- -- -- -- -- -- --    06/11/25 2100 -- 87 32 136/61 88 93 % -- -- -- -- -- -- --    06/11/25 2027 -- -- 18 -- -- -- 28 -- 2 L/min Nasal cannula -- -- --    06/11/25 2000 -- 87 33 -- -- 94 % -- -- -- -- -- 15 No Pain    06/11/25 1916 98.1 °F (36.7 °C) -- -- -- -- -- -- -- -- -- -- -- --    06/11/25 1900 -- 83 28 98/57 75 93 % -- -- -- -- -- -- --    06/11/25 1800 -- 93 26 142/66 95 93 % 28 -- 2 L/min Nasal cannula -- -- --    06/11/25 1700 -- 99 26 136/65 92 93 % -- -- -- Nasal cannula -- -- --    06/11/25 1600 99.9 °F (37.7 °C) 97 22 142/77 99 95 % -- -- -- None (Room air) Lying 15 --    06/11/25 1400 -- 98 22 144/64 92 94 % -- -- -- -- -- -- --    06/11/25 1309 -- 95 24 153/72 104 96 % 28 -- 2 L/min  Nasal cannula -- -- --    06/11/25 1300 -- 98 20 153/72 104 95 % -- -- -- -- -- -- --    06/11/25 1200 -- -- -- -- -- -- -- -- -- -- -- 15 --    06/11/25 0924 -- -- -- -- -- -- -- -- -- -- -- -- 3    06/11/25 0908 -- -- -- -- -- -- -- -- -- -- -- -- 3    06/11/25 0900 -- 97 24 127/57 82 96 % -- -- -- -- -- -- --    06/11/25 0856 -- -- -- -- -- -- 32 -- 3 L/min  Nasal cannula -- -- --    06/11/25 0800 -- -- -- -- -- -- 40 -- 5 L/min -- -- 15 --    06/11/25 0731 -- -- -- -- -- -- -- -- -- -- -- -- 3    06/11/25 0723 -- 76 -- 128/80 -- 96 % 40 -- 5 L/min  Nasal cannula  -- -- --    06/11/25 0700 98 °F (36.7 °C) 88 25 157/72 104 98 % -- -- -- -- Lying -- --    06/11/25 0424 -- -- -- -- -- -- -- -- -- -- -- -- 6    06/11/25 0400 98.5 °F (36.9 °C) 103 22 157/75 108 95 % 44 -- 6 L/min Mid flow nasal  cannula Lying -- No Pain    06/11/25 0202 -- -- -- -- -- -- 44 6 L/min 6 L/min  Mid flow nasal cannula -- -- --    06/11/25 0000 99 °F (37.2 °C) 96 24 145/78 106 97 % 52 -- 8 L/min Mid flow nasal cannula Lying 15 No Pain    06/10/25 2200 -- 85 24 143/67 97 99 % -- -- -- -- -- -- --    06/10/25 2100 -- 90 22 122/58 84 97 % 52 8 L/min 8 L/min Mid flow nasal cannula -- -- --    06/10/25 2000 -- 88 20 136/68 94 96 % 52 -- 8 L/min Mid flow nasal cannula Lying 15 No Pain    06/10/25 1933 99.9 °F (37.7 °C) -- -- -- -- -- -- -- -- -- -- -- --    06/10/25 1832 -- -- -- -- -- 97 % 52 -- 8 L/min  Mid flow nasal cannula -- -- --    06/10/25 1821 -- -- -- -- -- 99 % 60 -- 10 L/min  Mid flow nasal cannula -- -- --    06/10/25 1807 -- -- -- -- -- 100 % 72 -- 13 L/min  Mid flow nasal cannula -- -- --    06/10/25 1649 -- 104 46 150/70 101 97 % -- -- -- Mid flow nasal cannula -- 15 No Pain    06/10/25 1644 99 °F (37.2 °C) 109 51 173/75 107 94 % -- -- -- Mid flow nasal cannula -- -- --    06/10/25 1639 -- -- -- -- -- -- 80 -- 15 L/min Mid flow nasal cannula -- -- --    06/10/25 1617 -- 98 26 163/77 -- 97 % -- -- -- Non-rebreather mask Sitting -- --    06/10/25 1616 -- -- -- -- -- -- -- -- -- -- -- 15 --    06/10/25 1615 -- 96 28 160/74 106 98 % 80 -- 15 L/min Non-rebreather mask -- -- --    06/10/25 1600 -- 100 32 173/80 115 84 % 28 -- 2 L/min Nasal cannula -- -- --    06/10/25 1530 -- 87 24 176/77 110 92 % 28 -- 2 L/min Nasal cannula -- -- --    06/10/25 1415 -- 77 20 110/59 81 95 % 28 -- 2 L/min Nasal cannula -- -- --    06/10/25 1345 -- 76 22 117/58 83 94 % 28 -- 2 L/min Nasal cannula -- -- --    06/10/25 1330 -- 78 22 114/59 80 95 % 28 -- 2 L/min Nasal cannula -- -- --    06/10/25 1315 -- 82 20 108/55 76 93 % 28 -- 2 L/min Nasal cannula -- -- --    06/10/25 1300 -- 81 22 111/58 78 91 % 28 -- 2 L/min Nasal cannula -- -- --    06/10/25 1245 -- 79 22 111/55 77 93 % 28 -- 2 L/min Nasal cannula -- -- --    06/10/25 1215 -- 81 20 122/61  83 93 % 28 -- 2 L/min Nasal cannula -- -- --    06/10/25 1200 -- 81 -- 118/56 80 93 % 28 -- 2 L/min Nasal cannula -- -- --    06/10/25 1145 97.6 °F (36.4 °C) 83 22 121/60 84 94 % 28 -- 2 L/min Nasal cannula -- -- --    06/10/25 1130 -- 84 22 125/63 88 92 % 28 -- 2 L/min Nasal cannula -- -- --    06/10/25 1115 -- 85 22 140/68 97 92 % 28 -- 2 L/min Nasal cannula -- -- --    06/10/25 1100 -- 86 20 137/65 93 93 % 28 -- 2 L/min Nasal cannula -- -- --    06/10/25 1059 -- 87 -- 148/69 99 91 % -- -- -- -- -- -- --    06/10/25 1044 -- 89 -- 147/65 93 90 % -- -- -- -- -- -- --    06/10/25 1029 -- 82 -- 138/66 95 91 % -- -- -- -- -- -- --    06/10/25 1015 -- 82 -- 125/60 -- 92 % -- -- -- -- -- -- --    06/10/25 1014 -- -- -- -- -- -- -- -- -- -- -- 15 --    06/10/25 1010 -- 83 -- 125/60 87 93 % -- -- -- -- -- -- --    06/10/25 1004 -- 83 -- 123/59 85 93 % -- -- -- -- -- -- --    06/10/25 0954 -- 95 -- 137/64 92 90 % -- -- -- -- -- -- --    06/10/25 0901 -- 89 -- 139/67 96 95 % -- -- -- -- -- -- --    06/10/25 0851 -- 94 -- 145/74 102 94 % -- -- -- -- -- -- --    06/10/25 0845 -- 94 -- 152/70 100 93 % -- -- -- -- -- -- --    06/10/25 0814 100.2 °F (37.9 °C) 104 24 143/64 -- 90 % -- -- -- None (Room air) Sitting -- 6          Weight (last 2 days)       Date/Time Weight    06/13/25 0300 93.3 (205.69)    06/12/25 0500 93.3 (205.69)    06/11/25 0723 94.8 (209)    06/11/25 0600 95.1 (209.66)            Pertinent Labs/Diagnostic Results:   Radiology:  XR chest portable   Final Interpretation by Paige Jackson MD (06/13 1514)      Improving right lower lobe pneumonia.            Workstation performed: [id number]         XR chest portable ICU   Final Interpretation by Mery Clark MD (06/11 0807)      Improving right lower lobe pneumonia.            Workstation performed: [id number]         CT chest abdomen pelvis w contrast   Final Interpretation by Saran Kiran MD (06/10 6261)      1. Significant right lower lobe  pneumonia and small right pleural effusion. Mediastinal and hilar lymph nodes are likely reactive. Follow-up noncontrast chest CT is recommended in 3 months.      2. Solid 5 mm left upper lobe pulmonary nodule. The nodule will be reassessed on the aforementioned CT.      3. 1.8 cm right adrenal nodule. Although its imaging features are indeterminate, it does not have suspicious imaging features (heterogeneity, necrosis, irregular margins), therefore this is likely benign, and can be followed by non-contrast abdomen CT or    MRI in 12 months. Please note that for adrenal nodule > 1 cm,  biochemical evaluation is suggested to rule out functioning adenoma, if not already performed. Considerations related to the patient's age and/or comorbidities may be used to alter these    recommendations.      The study was marked in EPIC for immediate notification.      Resident: ZEE MALAVE I, the attending radiologist, have reviewed the images and agree with the final report above.      Workstation performed: [id number]         XR chest 1 view portable   ED Interpretation by Brody Riggs MD (06/10 1058)   Right side Pneumonia       Final Interpretation by Khris Gonzalez MD (06/10 1615)      Right lower lobe pneumonia.            Workstation performed: [id number]           Cardiology:  ECG 12 lead   Final Result by Lane Menchaca MD (06/12 0863)   Atrial tachycardia with rapid ventricular response   Left axis deviation   Low voltage QRS   Nonspecific ST-t wave changes   When compared with ECG of 10-Nain-2025 17:59,   Vent. rate has increased by  79 bpm      Confirmed by Lane Menchaca ([id number]) on 6/12/2025 8:53:38 AM      Echo complete w/ contrast if indicated   Final Result by Lane Menchaca MD (06/11 2749)        Left Ventricle: Left ventricular cavity size is normal. Wall thickness    is normal. The left ventricular ejection fraction is 55%. Systolic    function is normal. Wall motion is normal.     Right  Ventricle: Right ventricular cavity size is normal. Systolic    function is normal.     Left Atrium: The atrium is dilated.         ECG 12 lead   Final Result by Lane Menchaca MD (06/11 0836)   Atrial fibrillation   Controlled ventricular response   Nonspecific ST-t wave changes   when compared to  10-Nain-2025 08:24,   No significant change was found      Confirmed by Lane Menchaca ([id number]) on 6/11/2025 8:36:01 AM      ECG 12 lead   Final Result by Lane Menchaca MD (06/10 1104)   Atrial fibrillation   Rightward axis   Intraventricular conduction delay   When compared with ECG of 02-Jan-2023 14:14,   Vent. rate has increased by  35 bpm   Confirmed by Lane Menchaca ([id number]) on 6/10/2025 11:04:09 AM        Results from last 7 days   Lab Units 06/10/25  0836   SARS-COV-2  Negative     Results from last 7 days   Lab Units 06/13/25 0317 06/12/25  0505 06/11/25  0426 06/10/25  1623 06/10/25  0836   WBC Thousand/uL 10.97* 12.37* 12.38*  --  12.59*   HEMOGLOBIN g/dL 10.8* 11.9* 11.5*  --  12.3   I STAT HEMOGLOBIN g/dl  --   --   --  12.2  --    HEMATOCRIT % 34.9* 37.6 36.4*  --  38.5   HEMATOCRIT, ISTAT %  --   --   --  36*  --    PLATELETS Thousands/uL 230 229 198  --  223   TOTAL NEUT ABS Thousands/µL  --   --   --   --  10.89*         Results from last 7 days   Lab Units 06/13/25 0317 06/12/25  0505 06/11/25  0426 06/10/25  1729 06/10/25  1623 06/10/25  0836   SODIUM mmol/L 137 136 135 137  --  131*   POTASSIUM mmol/L 3.9 4.3 3.9 4.2  --  4.0   CHLORIDE mmol/L 101 100 101 103  --  98   CO2 mmol/L 29 28 25 25  --  26   CO2, I-STAT mmol/L  --   --   --   --  22  --    ANION GAP mmol/L 7 8 9 9  --  7   BUN mg/dL 13 15 15 14  --  15   CREATININE mg/dL 0.73 0.82 1.01 1.05  --  1.05   EGFR ml/min/1.73sq m 90 85 71 68  --  68   CALCIUM mg/dL 8.6 9.0 8.5 8.3*  --  8.7   CALCIUM, IONIZED mmol/L  --  1.08* 1.06* 1.01*  --   --    CALCIUM, IONIZED, ISTAT mmol/L  --   --   --   --  1.16  --    MAGNESIUM mg/dL  --  2.3  1.8* 1.9  --   --    PHOSPHORUS mg/dL  --  3.3 2.4 3.1  --   --      Results from last 7 days   Lab Units 06/10/25  1729 06/10/25  0836   AST U/L 14 14   ALT U/L 11 10   ALK PHOS U/L 54 55   TOTAL PROTEIN g/dL 6.8 6.8   ALBUMIN g/dL 4.0 4.1   TOTAL BILIRUBIN mg/dL 1.29* 1.59*         Results from last 7 days   Lab Units 06/13/25  0317 06/12/25  0505 06/11/25  0426 06/10/25  1729 06/10/25  0836   GLUCOSE RANDOM mg/dL 137 131 126 125 125         Results from last 7 days   Lab Units 06/11/25  0426   HEMOGLOBIN A1C % 6.0*   EAG mg/dl 126     Results from last 7 days   Lab Units 06/10/25  1623   I STAT BASE EXC mmol/L -5*   I STAT O2 SAT % 98*   ISTAT PH ART  7.302*   I STAT ART PCO2 mm HG 42.3   I STAT ART PO2 mm .0   I STAT ART HCO3 mmol/L 20.9*         Results from last 7 days   Lab Units 06/10/25  1729   HS TNI 0HR ng/L 16         Results from last 7 days   Lab Units 06/10/25  0836   PROTIME seconds 26.5*   INR  2.41*   PTT seconds 49*         Results from last 7 days   Lab Units 06/12/25  0505 06/11/25  0426 06/10/25  0836   PROCALCITONIN ng/ml 0.95* 0.91* 0.62*     Results from last 7 days   Lab Units 06/10/25  0836   LACTIC ACID mmol/L 1.0             Results from last 7 days   Lab Units 06/10/25  1504   BNP pg/mL 409*     Results from last 7 days   Lab Units 06/10/25  0957   CLARITY UA  Clear   COLOR UA  Yellow   SPEC GRAV UA  1.010   PH UA  6.0   GLUCOSE UA mg/dl Negative   KETONES UA mg/dl 15 (1+)*   BLOOD UA  Negative   PROTEIN UA mg/dl Trace*   NITRITE UA  Negative   BILIRUBIN UA  Negative   UROBILINOGEN UA E.U./dl 0.2   LEUKOCYTES UA  Negative   WBC UA /hpf 0-1   RBC UA /hpf None Seen   BACTERIA UA /hpf Occasional   EPITHELIAL CELLS WET PREP /hpf Occasional     Results from last 7 days   Lab Units 06/10/25  1503 06/10/25  0836   STREP PNEUMONIAE ANTIGEN, URINE  Negative  --    LEGIONELLA URINARY ANTIGEN  Negative  --    INFLUENZA A PCR   --  Negative   INFLUENZA B PCR   --  Negative   RSV PCR   --   Negative     Results from last 7 days   Lab Units 06/10/25  0836   BLOOD CULTURE  No Growth at 72 hrs.  No Growth at 72 hrs.       Network Utilization Review Department  ATTENTION: Please call with any questions or concerns to [phone] and carefully listen to the prompts so that you are directed to the right person. All voicemails are confidential.   For Discharge needs, contact Care Management DC Support Team at [phone] opt. 2  Send all requests for admission clinical reviews, approved or denied determinations and any other requests to dedicated fax number below belonging to the [facility] where the patient is receiving treatment. List of dedicated fax numbers for the Facilities:  FACILITY NAME UR FAX NUMBER   ADMISSION DENIALS (Administrative/Medical Necessity) [phone]   DISCHARGE SUPPORT TEAM (NETWORK) [phone]   PARENT CHILD HEALTH (Maternity/NICU/Pediatrics) [phone]   [facility] [phone]   [facility] [phone]   [facility] [phone]   [facility] [phone]   [facility] [phone]   [facility] [phone]   [facility] [phone]   [facility] [phone]   [facility] [phone]   [facility] [phone]   [facility] [phone]   [facility] [phone]

## 2025-06-16 ENCOUNTER — APPOINTMENT (OUTPATIENT)
Dept: GENERAL RADIOLOGY | Age: 40
End: 2025-06-16
Payer: MEDICAID

## 2025-06-16 ENCOUNTER — APPOINTMENT (OUTPATIENT)
Dept: VASCULAR LAB | Age: 40
End: 2025-06-16
Payer: MEDICAID

## 2025-06-16 ENCOUNTER — HOSPITAL ENCOUNTER (INPATIENT)
Age: 40
LOS: 12 days | Discharge: NURSING, REHAB OR LONG-TERM CARE | End: 2025-06-28
Attending: EMERGENCY MEDICINE | Admitting: HOSPITALIST
Payer: MEDICAID

## 2025-06-16 DIAGNOSIS — D64.9 ANEMIA, UNSPECIFIED TYPE: ICD-10-CM

## 2025-06-16 DIAGNOSIS — R06.02 SHORTNESS OF BREATH: ICD-10-CM

## 2025-06-16 DIAGNOSIS — A41.9 SEPSIS, DUE TO UNSPECIFIED ORGANISM, UNSPECIFIED WHETHER ACUTE ORGAN DYSFUNCTION PRESENT (HCC): Primary | ICD-10-CM

## 2025-06-16 DIAGNOSIS — N17.9 AKI (ACUTE KIDNEY INJURY): ICD-10-CM

## 2025-06-16 DIAGNOSIS — M79.662 PAIN OF LEFT CALF: ICD-10-CM

## 2025-06-16 DIAGNOSIS — E87.6 HYPOKALEMIA: ICD-10-CM

## 2025-06-16 PROBLEM — I89.0 CHRONIC ACQUIRED LYMPHEDEMA: Status: ACTIVE | Noted: 2025-06-16

## 2025-06-16 PROBLEM — F32.A DEPRESSION: Status: ACTIVE | Noted: 2025-06-16

## 2025-06-16 PROBLEM — E87.8 ELECTROLYTE IMBALANCE: Status: ACTIVE | Noted: 2025-06-16

## 2025-06-16 PROBLEM — R79.89 ELEVATED LACTIC ACID LEVEL: Status: ACTIVE | Noted: 2025-06-16

## 2025-06-16 PROBLEM — F41.9 ANXIETY: Status: ACTIVE | Noted: 2025-06-16

## 2025-06-16 LAB
ALBUMIN SERPL-MCNC: 3.5 G/DL (ref 3.5–5.2)
ALBUMIN/GLOB SERPL: 1 {RATIO} (ref 1–2.5)
ALP SERPL-CCNC: 83 U/L (ref 35–104)
ALT SERPL-CCNC: 49 U/L (ref 10–35)
ANION GAP SERPL CALCULATED.3IONS-SCNC: 17 MMOL/L (ref 9–16)
ANION GAP SERPL CALCULATED.3IONS-SCNC: 24 MMOL/L (ref 9–16)
AST SERPL-CCNC: 142 U/L (ref 10–35)
BACTERIA URNS QL MICRO: NORMAL
BASOPHILS # BLD: 0 K/UL (ref 0–0.2)
BASOPHILS NFR BLD: 0 % (ref 0–2)
BILIRUB SERPL-MCNC: 1 MG/DL (ref 0–1.2)
BILIRUB UR QL STRIP: NEGATIVE
BNP SERPL-MCNC: 681 PG/ML (ref 0–125)
BUN SERPL-MCNC: 15 MG/DL (ref 6–20)
BUN SERPL-MCNC: 17 MG/DL (ref 6–20)
CALCIUM SERPL-MCNC: 7.5 MG/DL (ref 8.6–10.4)
CALCIUM SERPL-MCNC: 8.4 MG/DL (ref 8.6–10.4)
CASTS #/AREA URNS LPF: NORMAL /LPF (ref 0–8)
CHLORIDE SERPL-SCNC: 91 MMOL/L (ref 98–107)
CHLORIDE SERPL-SCNC: 95 MMOL/L (ref 98–107)
CLARITY UR: ABNORMAL
CO2 SERPL-SCNC: 14 MMOL/L (ref 20–31)
CO2 SERPL-SCNC: 16 MMOL/L (ref 20–31)
COLOR UR: YELLOW
CREAT SERPL-MCNC: 1 MG/DL (ref 0.6–0.9)
CREAT SERPL-MCNC: 1.3 MG/DL (ref 0.6–0.9)
EOSINOPHIL # BLD: 0 K/UL (ref 0–0.4)
EOSINOPHILS RELATIVE PERCENT: 0 % (ref 1–4)
EPI CELLS #/AREA URNS HPF: NORMAL /HPF (ref 0–5)
ERYTHROCYTE [DISTWIDTH] IN BLOOD BY AUTOMATED COUNT: 21.8 % (ref 11.8–14.4)
FERRITIN SERPL-MCNC: 77 NG/ML (ref 15–150)
GFR, ESTIMATED: 54 ML/MIN/1.73M2
GFR, ESTIMATED: 74 ML/MIN/1.73M2
GLUCOSE SERPL-MCNC: 79 MG/DL (ref 74–99)
GLUCOSE SERPL-MCNC: 95 MG/DL (ref 74–99)
GLUCOSE UR STRIP-MCNC: NEGATIVE MG/DL
HCT VFR BLD AUTO: 26.4 % (ref 36.3–47.1)
HGB BLD-MCNC: 7.2 G/DL (ref 11.9–15.1)
HGB UR QL STRIP.AUTO: ABNORMAL
IMM GRANULOCYTES # BLD AUTO: 0.42 K/UL (ref 0–0.3)
IMM GRANULOCYTES NFR BLD: 3 %
IRON SATN MFR SERPL: 3 % (ref 20–55)
IRON SERPL-MCNC: 7 UG/DL (ref 37–145)
KETONES UR STRIP-MCNC: ABNORMAL MG/DL
LACTIC ACID, SEPSIS WHOLE BLOOD: 7.7 MMOL/L (ref 0.5–1.9)
LEUKOCYTE ESTERASE UR QL STRIP: ABNORMAL
LYMPHOCYTES NFR BLD: 0.28 K/UL (ref 1–4.8)
LYMPHOCYTES RELATIVE PERCENT: 2 % (ref 24–44)
MCH RBC QN AUTO: 17.1 PG (ref 25.2–33.5)
MCHC RBC AUTO-ENTMCNC: 27.3 G/DL (ref 28.4–34.8)
MCV RBC AUTO: 62.7 FL (ref 82.6–102.9)
MONOCYTES NFR BLD: 0 % (ref 1–7)
MONOCYTES NFR BLD: 0 K/UL (ref 0.1–0.8)
MORPHOLOGY: ABNORMAL
NEUTROPHILS NFR BLD: 95 % (ref 36–66)
NEUTS SEG NFR BLD: 13.4 K/UL (ref 1.8–7.7)
NITRITE UR QL STRIP: NEGATIVE
NRBC BLD-RTO: 0 PER 100 WBC
PH UR STRIP: 5.5 [PH] (ref 5–8)
PLATELET # BLD AUTO: ABNORMAL K/UL (ref 138–453)
PLATELET, FLUORESCENCE: 192 K/UL (ref 138–453)
PLATELETS.RETICULATED NFR BLD AUTO: 6 % (ref 1.1–10.3)
POTASSIUM SERPL-SCNC: 2.7 MMOL/L (ref 3.7–5.3)
POTASSIUM SERPL-SCNC: 2.9 MMOL/L (ref 3.7–5.3)
PROCALCITONIN SERPL-MCNC: 4.36 NG/ML (ref 0–0.09)
PROT SERPL-MCNC: 7.1 G/DL (ref 6.6–8.7)
PROT UR STRIP-MCNC: ABNORMAL MG/DL
RBC # BLD AUTO: 4.21 M/UL (ref 3.95–5.11)
RBC #/AREA URNS HPF: NORMAL /HPF (ref 0–4)
SODIUM SERPL-SCNC: 128 MMOL/L (ref 136–145)
SODIUM SERPL-SCNC: 129 MMOL/L (ref 136–145)
SP GR UR STRIP: 1.02 (ref 1–1.03)
TIBC SERPL-MCNC: 279 UG/DL (ref 250–450)
TROPONIN I SERPL HS-MCNC: 18 NG/L (ref 0–14)
TROPONIN I SERPL HS-MCNC: 19 NG/L (ref 0–14)
UNSATURATED IRON BINDING CAPACITY: 272 UG/DL (ref 112–347)
UROBILINOGEN UR STRIP-ACNC: NORMAL EU/DL (ref 0–1)
WBC #/AREA URNS HPF: NORMAL /HPF (ref 0–5)
WBC OTHER # BLD: 14.1 K/UL (ref 3.5–11.3)

## 2025-06-16 PROCEDURE — 73590 X-RAY EXAM OF LOWER LEG: CPT

## 2025-06-16 PROCEDURE — 99223 1ST HOSP IP/OBS HIGH 75: CPT | Performed by: HOSPITALIST

## 2025-06-16 PROCEDURE — 71045 X-RAY EXAM CHEST 1 VIEW: CPT

## 2025-06-16 PROCEDURE — 93970 EXTREMITY STUDY: CPT

## 2025-06-16 PROCEDURE — 6360000002 HC RX W HCPCS

## 2025-06-16 PROCEDURE — 6370000000 HC RX 637 (ALT 250 FOR IP)

## 2025-06-16 PROCEDURE — 83605 ASSAY OF LACTIC ACID: CPT

## 2025-06-16 PROCEDURE — 80053 COMPREHEN METABOLIC PANEL: CPT

## 2025-06-16 PROCEDURE — 81001 URINALYSIS AUTO W/SCOPE: CPT

## 2025-06-16 PROCEDURE — 2580000003 HC RX 258

## 2025-06-16 PROCEDURE — 99285 EMERGENCY DEPT VISIT HI MDM: CPT

## 2025-06-16 PROCEDURE — 87154 CUL TYP ID BLD PTHGN 6+ TRGT: CPT

## 2025-06-16 PROCEDURE — 83550 IRON BINDING TEST: CPT

## 2025-06-16 PROCEDURE — 83540 ASSAY OF IRON: CPT

## 2025-06-16 PROCEDURE — 85055 RETICULATED PLATELET ASSAY: CPT

## 2025-06-16 PROCEDURE — 73562 X-RAY EXAM OF KNEE 3: CPT

## 2025-06-16 PROCEDURE — 87077 CULTURE AEROBIC IDENTIFY: CPT

## 2025-06-16 PROCEDURE — 96375 TX/PRO/DX INJ NEW DRUG ADDON: CPT

## 2025-06-16 PROCEDURE — 93005 ELECTROCARDIOGRAM TRACING: CPT

## 2025-06-16 PROCEDURE — 2060000000 HC ICU INTERMEDIATE R&B

## 2025-06-16 PROCEDURE — 84145 PROCALCITONIN (PCT): CPT

## 2025-06-16 PROCEDURE — 84484 ASSAY OF TROPONIN QUANT: CPT

## 2025-06-16 PROCEDURE — 96365 THER/PROPH/DIAG IV INF INIT: CPT

## 2025-06-16 PROCEDURE — 83880 ASSAY OF NATRIURETIC PEPTIDE: CPT

## 2025-06-16 PROCEDURE — 85025 COMPLETE CBC W/AUTO DIFF WBC: CPT

## 2025-06-16 PROCEDURE — 82550 ASSAY OF CK (CPK): CPT

## 2025-06-16 PROCEDURE — 82728 ASSAY OF FERRITIN: CPT

## 2025-06-16 PROCEDURE — 87205 SMEAR GRAM STAIN: CPT

## 2025-06-16 PROCEDURE — 87040 BLOOD CULTURE FOR BACTERIA: CPT

## 2025-06-16 PROCEDURE — 87086 URINE CULTURE/COLONY COUNT: CPT

## 2025-06-16 PROCEDURE — 80048 BASIC METABOLIC PNL TOTAL CA: CPT

## 2025-06-16 RX ORDER — ACETAMINOPHEN 500 MG
1000 TABLET ORAL
Status: DISPENSED | OUTPATIENT
Start: 2025-06-16 | End: 2025-06-17

## 2025-06-16 RX ORDER — POTASSIUM CHLORIDE 7.45 MG/ML
10 INJECTION INTRAVENOUS
Status: DISPENSED | OUTPATIENT
Start: 2025-06-17 | End: 2025-06-17

## 2025-06-16 RX ORDER — SODIUM CHLORIDE 0.9 % (FLUSH) 0.9 %
5-40 SYRINGE (ML) INJECTION PRN
Status: DISCONTINUED | OUTPATIENT
Start: 2025-06-16 | End: 2025-06-28 | Stop reason: HOSPADM

## 2025-06-16 RX ORDER — HEPARIN SODIUM 5000 [USP'U]/ML
5000 INJECTION, SOLUTION INTRAVENOUS; SUBCUTANEOUS EVERY 8 HOURS SCHEDULED
Status: DISCONTINUED | OUTPATIENT
Start: 2025-06-17 | End: 2025-06-28 | Stop reason: HOSPADM

## 2025-06-16 RX ORDER — POTASSIUM CHLORIDE 7.45 MG/ML
10 INJECTION INTRAVENOUS ONCE
Status: DISCONTINUED | OUTPATIENT
Start: 2025-06-16 | End: 2025-06-17

## 2025-06-16 RX ORDER — QUETIAPINE FUMARATE 200 MG/1
200 TABLET, FILM COATED ORAL NIGHTLY
Status: DISCONTINUED | OUTPATIENT
Start: 2025-06-16 | End: 2025-06-26

## 2025-06-16 RX ORDER — ONDANSETRON 4 MG/1
4 TABLET, ORALLY DISINTEGRATING ORAL EVERY 8 HOURS PRN
Status: DISCONTINUED | OUTPATIENT
Start: 2025-06-16 | End: 2025-06-17

## 2025-06-16 RX ORDER — TRAZODONE HYDROCHLORIDE 50 MG/1
50 TABLET ORAL NIGHTLY
Status: DISCONTINUED | OUTPATIENT
Start: 2025-06-16 | End: 2025-06-28 | Stop reason: HOSPADM

## 2025-06-16 RX ORDER — POTASSIUM CHLORIDE 7.45 MG/ML
10 INJECTION INTRAVENOUS
Status: COMPLETED | OUTPATIENT
Start: 2025-06-16 | End: 2025-06-17

## 2025-06-16 RX ORDER — POTASSIUM CHLORIDE 7.45 MG/ML
10 INJECTION INTRAVENOUS PRN
Status: DISCONTINUED | OUTPATIENT
Start: 2025-06-16 | End: 2025-06-28 | Stop reason: HOSPADM

## 2025-06-16 RX ORDER — SODIUM CHLORIDE 0.9 % (FLUSH) 0.9 %
5-40 SYRINGE (ML) INJECTION EVERY 12 HOURS SCHEDULED
Status: DISCONTINUED | OUTPATIENT
Start: 2025-06-16 | End: 2025-06-28 | Stop reason: HOSPADM

## 2025-06-16 RX ORDER — POTASSIUM CHLORIDE 1500 MG/1
40 TABLET, EXTENDED RELEASE ORAL PRN
Status: DISCONTINUED | OUTPATIENT
Start: 2025-06-16 | End: 2025-06-28 | Stop reason: HOSPADM

## 2025-06-16 RX ORDER — SODIUM CHLORIDE, SODIUM LACTATE, POTASSIUM CHLORIDE, CALCIUM CHLORIDE 600; 310; 30; 20 MG/100ML; MG/100ML; MG/100ML; MG/100ML
INJECTION, SOLUTION INTRAVENOUS CONTINUOUS
Status: DISCONTINUED | OUTPATIENT
Start: 2025-06-16 | End: 2025-06-23

## 2025-06-16 RX ORDER — POLYETHYLENE GLYCOL 3350 17 G/17G
17 POWDER, FOR SOLUTION ORAL DAILY PRN
Status: DISCONTINUED | OUTPATIENT
Start: 2025-06-16 | End: 2025-06-28 | Stop reason: HOSPADM

## 2025-06-16 RX ORDER — MORPHINE SULFATE 4 MG/ML
4 INJECTION INTRAVENOUS
Refills: 0 | Status: COMPLETED | OUTPATIENT
Start: 2025-06-16 | End: 2025-06-16

## 2025-06-16 RX ORDER — MAGNESIUM SULFATE IN WATER 40 MG/ML
2000 INJECTION, SOLUTION INTRAVENOUS PRN
Status: DISCONTINUED | OUTPATIENT
Start: 2025-06-16 | End: 2025-06-28 | Stop reason: HOSPADM

## 2025-06-16 RX ORDER — 0.9 % SODIUM CHLORIDE 0.9 %
1000 INTRAVENOUS SOLUTION INTRAVENOUS ONCE
Status: COMPLETED | OUTPATIENT
Start: 2025-06-16 | End: 2025-06-16

## 2025-06-16 RX ORDER — METHADONE HYDROCHLORIDE 10 MG/1
120 TABLET ORAL DAILY
Refills: 0 | Status: DISCONTINUED | OUTPATIENT
Start: 2025-06-17 | End: 2025-06-18

## 2025-06-16 RX ORDER — 0.9 % SODIUM CHLORIDE 0.9 %
500 INTRAVENOUS SOLUTION INTRAVENOUS ONCE
Status: COMPLETED | OUTPATIENT
Start: 2025-06-16 | End: 2025-06-17

## 2025-06-16 RX ORDER — MAGNESIUM SULFATE IN WATER 40 MG/ML
2000 INJECTION, SOLUTION INTRAVENOUS ONCE
Status: COMPLETED | OUTPATIENT
Start: 2025-06-16 | End: 2025-06-16

## 2025-06-16 RX ORDER — ACETAMINOPHEN 650 MG/1
650 SUPPOSITORY RECTAL EVERY 6 HOURS PRN
Status: DISCONTINUED | OUTPATIENT
Start: 2025-06-16 | End: 2025-06-28 | Stop reason: HOSPADM

## 2025-06-16 RX ORDER — ONDANSETRON 2 MG/ML
4 INJECTION INTRAMUSCULAR; INTRAVENOUS EVERY 6 HOURS PRN
Status: DISCONTINUED | OUTPATIENT
Start: 2025-06-16 | End: 2025-06-25

## 2025-06-16 RX ORDER — SODIUM CHLORIDE 9 MG/ML
INJECTION, SOLUTION INTRAVENOUS PRN
Status: DISCONTINUED | OUTPATIENT
Start: 2025-06-16 | End: 2025-06-28 | Stop reason: HOSPADM

## 2025-06-16 RX ORDER — MORPHINE SULFATE 4 MG/ML
4 INJECTION INTRAVENOUS
Refills: 0 | Status: DISCONTINUED | OUTPATIENT
Start: 2025-06-16 | End: 2025-06-16

## 2025-06-16 RX ORDER — ONDANSETRON 2 MG/ML
4 INJECTION INTRAMUSCULAR; INTRAVENOUS ONCE
Status: COMPLETED | OUTPATIENT
Start: 2025-06-16 | End: 2025-06-16

## 2025-06-16 RX ORDER — ACETAMINOPHEN 325 MG/1
650 TABLET ORAL EVERY 6 HOURS PRN
Status: DISCONTINUED | OUTPATIENT
Start: 2025-06-16 | End: 2025-06-28 | Stop reason: HOSPADM

## 2025-06-16 RX ORDER — POTASSIUM CHLORIDE 7.45 MG/ML
10 INJECTION INTRAVENOUS
Status: DISCONTINUED | OUTPATIENT
Start: 2025-06-16 | End: 2025-06-16

## 2025-06-16 RX ORDER — FENTANYL CITRATE 50 UG/ML
50 INJECTION, SOLUTION INTRAMUSCULAR; INTRAVENOUS ONCE
Status: COMPLETED | OUTPATIENT
Start: 2025-06-16 | End: 2025-06-16

## 2025-06-16 RX ORDER — ENOXAPARIN SODIUM 100 MG/ML
30 INJECTION SUBCUTANEOUS 2 TIMES DAILY
Status: DISCONTINUED | OUTPATIENT
Start: 2025-06-16 | End: 2025-06-16

## 2025-06-16 RX ADMIN — POTASSIUM CHLORIDE 10 MEQ: 7.46 INJECTION, SOLUTION INTRAVENOUS at 17:10

## 2025-06-16 RX ADMIN — VANCOMYCIN HYDROCHLORIDE 2500 MG: 1 INJECTION, POWDER, LYOPHILIZED, FOR SOLUTION INTRAVENOUS at 18:17

## 2025-06-16 RX ADMIN — ONDANSETRON 4 MG: 4 TABLET, ORALLY DISINTEGRATING ORAL at 20:23

## 2025-06-16 RX ADMIN — SODIUM CHLORIDE, POTASSIUM CHLORIDE, SODIUM LACTATE AND CALCIUM CHLORIDE: 600; 310; 30; 20 INJECTION, SOLUTION INTRAVENOUS at 22:43

## 2025-06-16 RX ADMIN — POTASSIUM CHLORIDE 10 MEQ: 7.46 INJECTION, SOLUTION INTRAVENOUS at 18:12

## 2025-06-16 RX ADMIN — SODIUM CHLORIDE 1000 ML: 0.9 INJECTION, SOLUTION INTRAVENOUS at 16:02

## 2025-06-16 RX ADMIN — POTASSIUM BICARBONATE 40 MEQ: 782 TABLET, EFFERVESCENT ORAL at 16:57

## 2025-06-16 RX ADMIN — ONDANSETRON 4 MG: 2 INJECTION INTRAMUSCULAR; INTRAVENOUS at 15:58

## 2025-06-16 RX ADMIN — FENTANYL CITRATE 50 MCG: 50 INJECTION INTRAMUSCULAR; INTRAVENOUS at 16:58

## 2025-06-16 RX ADMIN — SODIUM CHLORIDE 500 ML: 0.9 INJECTION, SOLUTION INTRAVENOUS at 17:08

## 2025-06-16 RX ADMIN — MORPHINE SULFATE 4 MG: 4 INJECTION INTRAVENOUS at 15:59

## 2025-06-16 RX ADMIN — PIPERACILLIN AND TAZOBACTAM 3375 MG: 3; .375 INJECTION, POWDER, LYOPHILIZED, FOR SOLUTION INTRAVENOUS at 16:23

## 2025-06-16 RX ADMIN — POTASSIUM CHLORIDE 10 MEQ: 7.46 INJECTION, SOLUTION INTRAVENOUS at 19:21

## 2025-06-16 RX ADMIN — MAGNESIUM SULFATE HEPTAHYDRATE 2000 MG: 40 INJECTION, SOLUTION INTRAVENOUS at 16:27

## 2025-06-16 RX ADMIN — POTASSIUM CHLORIDE 10 MEQ: 7.46 INJECTION, SOLUTION INTRAVENOUS at 20:27

## 2025-06-16 ASSESSMENT — PAIN DESCRIPTION - ORIENTATION: ORIENTATION: LEFT

## 2025-06-16 ASSESSMENT — PAIN - FUNCTIONAL ASSESSMENT: PAIN_FUNCTIONAL_ASSESSMENT: 0-10

## 2025-06-16 ASSESSMENT — PAIN DESCRIPTION - LOCATION: LOCATION: LEG

## 2025-06-16 ASSESSMENT — PAIN SCALES - GENERAL: PAINLEVEL_OUTOF10: 10

## 2025-06-16 NOTE — PROGRESS NOTES
[facility]   Pharmacy Pharmacokinetic Monitoring Service - Vancomycin     Vannesa Dasilva is a 39 y.o. female starting on vancomycin therapy for sepsis. Pharmacy consulted by Dr. Wiseman for monitoring and adjustment.    Target Concentration: Dosing based on anticipated concentration <15 mg/L due to renal impairment/insufficiency    Additional Antimicrobials: Zosyn    Pertinent Laboratory Values:   Wt Readings from Last 1 Encounters:   06/16/25 (!) 137 kg (302 lb)     Temp Readings from Last 1 Encounters:   06/16/25 99.3 °F (37.4 °C) (Oral)     Estimated Creatinine Clearance: 77 mL/min (A) (based on SCr of 1.3 mg/dL (H)).  Recent Labs     06/16/25  1551   CREATININE 1.3*   BUN 17   WBC 14.1*     Pertinent Cultures:  Culture Date Source Results   6/16 Blood x 2 NGTD   MRSA Nasal Swab: N/A. Non-respiratory infection.    Plan:  Concentration-guided dosing due to renal impairment/insufficiency  Scr up to 1.3 today, appears baseline may be ~0.8  Start vancomycin 2500 mg x 1, then dose by levels for now  Renal labs as indicated   Vancomycin concentration ordered for 6/17 @ AM labs   Pharmacy will continue to monitor patient and adjust therapy as indicated    Thank you for the consult,  Karey Ortiz RPH  6/16/2025 5:17 PM

## 2025-06-16 NOTE — ED PROVIDER NOTES
Note Started: 5:43 PM EDT         [facility]     Emergency Department     Faculty Attestation    I performed a history and physical examination of the patient and discussed management with the resident. I reviewed the resident’s note and agree with the documented findings and plan of care. Any areas of disagreement are noted on the chart. I was personally present for the key portions of any procedures. I have documented in the chart those procedures where I was not present during the key portions. I have reviewed the emergency nurses triage note. I agree with the chief complaint, past medical history, past surgical history, allergies, medications, social and family history as documented unless otherwise noted below.        For Physician Assistant/ Nurse Practitioner cases/documentation I have personally evaluated this patient and have completed at least one if not all key elements of the E/M (history, physical exam, and MDM). Additional findings are as noted.  I have personally seen and evaluated the patient.  I find the patient's history and physical exam are consistent with the NP/PA documentation.  I agree with the care provided, treatment rendered, disposition and follow-up plan.    39-year-old female on chronic methadone presenting with severe left lower extremity pain.  Patient states that she was in her normal state of health until this morning.  She was in intermediate this morning, did not get her methadone dose.  Noted severe pain in her left leg and redness.  It swelled up through the day today.  Does have a history of lymphedema, however leg is significantly more swollen than baseline.    Exam:  General : Laying on the bed and alert  CV : Tachycardic and regular rhythm  Lungs : Tachypneic without hypoxia.  Rhonchorous lung sounds bilaterally  Abdomen : soft, non-tender, non-distended  Extremities: Lymphedema present in bilateral lower extremities.  2+ DP pulses bilaterally.  Left lower

## 2025-06-16 NOTE — ED TRIAGE NOTES
Pt presented to ED via EMS from scene.  Pt presents with C/O nausea/vomiting and fall.  Pt states she was in nursing home for 1 day and did not receive her methadone dose. Pt states her left leg began to swell today. Pt states pain in her left leg is a 10/10. Pt states the pain is why she fell. Pt states she takes her medications as prescribed.  Pt denies LOC or hitting her head.  Pt has a hx of Heart issues.  Pt is Alert and oriented. Pt is resting comfortably on stretcher with call light in reach.  No acute distress noted. Respirations are even and unlabored.  White board updated. Will continue to follow plan of care.

## 2025-06-16 NOTE — H&P
[facility]     Department of Internal Medicine - Staff Internal Medicine Teaching Service          ADMISSION NOTE/HISTORY AND PHYSICAL EXAMINATION   Date: 6/16/2025  Patient Name: Vannesa Dasilva  Date of admission: 6/16/2025  3:27 PM  YOB: 1985  PCP: Kasandra Gaona MD  History Obtained From:  patient, electronic medical record    CHIEF COMPLAINT     Chief complaint: Left leg pain and swelling    HISTORY OF PRESENTING ILLNESS     The patient is a pleasant 39 y.o. female with a PMHx significant for     Bilateral lower extremity lymphadenopathy  Anxiety/depression  Chronic iron deficiency anemia  Obesity hypoventilation syndrome    presents with a chief complaint of left leg pain and swelling.  According to the patient she was incarcerated yesterday and she states that she was in [facility] and she fell down and was left in same position for more than 2 hours.  After that she was having problem while walking.    Patient does have bilateral lower extremity nonpitting edema.  Patient denies losing consciousness or hitting her head when she fell.  Patient is not on any anticoagulation.  She denies chest pain, shortness of breath, fever, chills, rigors, cough, congestion, abdominal pain, nausea, vomiting, diarrhea, constipation.    On arrival to ED, patient was very tachycardic heart rate 140s and blood pressure 130/62.  She does have 1 episodes of fever maximum temperature recorded 101.  Initial lab was concerning for leukocytosis WBC 14.1, hemoglobin 7.2, platelets 192.  Hyponatremia sodium 129, potassium 2.7, creatinine 1.3, lactic acid 7.7, procalcitonin 4.36, mildly elevated proBNP 681, troponin 19.  X-ray of left foot unremarkable.  Also x-ray of chest unremarkable.    Review of Systems   Constitutional:  Positive for fatigue and fever. Negative for activity change, appetite change, chills and diaphoresis.   Respiratory:  Negative for apnea, shortness of breath and  1.0   ALKPHOS 83      MICROBIOLOGY:   Lab Results   Component Value Date/Time    CULTURE NO GROWTH <24 HRS 06/16/2025 03:35 PM    CULTURE NO GROWTH <24 HRS 06/16/2025 03:35 PM       Imaging:   XR CHEST PORTABLE  Result Date: 6/16/2025  No acute cardiopulmonary findings     XR KNEE LEFT (3 VIEWS)  Result Date: 6/16/2025  No acute bony or joint abnormality     XR TIBIA FIBULA LEFT (2 VIEWS)  Result Date: 6/16/2025  No acute bony or joint abnormality       ASSESSMENT & PLAN     Principal Problem:    Sepsis (HCC)  Active Problems:    Anemia    History of heroin use    Left leg swelling    Electrolyte imbalance    Elevated lactic acid level    Anxiety    Depression    Chronic acquired lymphedema  Resolved Problems:    * No resolved hospital problems. *     # Sepsis of unknown origin  # Bilateral lower leg swelling  - Presented with left leg pain and swelling, tachycardic and had an episode of 101 fever  - Elevated lactic acid 7.7  - Elevated Pro-[place] (4.36)  - Elevated leukocytosis (14.1)  - Continue  mL/h  - Follow-up on blood culture, urine culture  - Follow-up on CT PE, CT abdomen pelvis  -Follow-up on Doppler scan  - Continue vancomycin and Zosyn  - Will consult ID for antibiotic recommendation, will follow-up    # Electrolyte imbalance  # HOMA  # Hypokalemia  # Hyponatremia  -No EKG changes  -Continue  mL/h  -Will monitor BMP  - Replace electrolyte as needed    # Right heart failure  # Bilateral chronic lymphedema  # Lower limb swelling  - Echo 11/15/2022 showed EF 60 to 65%  - Patient is on Bumex 2 Mg daily at home (currently held because patient has hypokalemia and hyponatremia)  - Follow-up on DVT scan  - Continue DVT prophylaxis  - Follow-up on echo  - Monitor    # Chronic iron deficiency anemia  - Hemoglobin 7.2, with iron 7 and UIBC 3  - Continue IV iron (iron sucrose)  - Denies black tarry stool, AZ bleeding  - Will monitor    # Depression/anxiety  - Follows up on [facility]  - Currently on

## 2025-06-17 ENCOUNTER — APPOINTMENT (OUTPATIENT)
Age: 40
End: 2025-06-17
Payer: MEDICAID

## 2025-06-17 ENCOUNTER — APPOINTMENT (OUTPATIENT)
Dept: CT IMAGING | Age: 40
End: 2025-06-17
Payer: MEDICAID

## 2025-06-17 PROBLEM — A40.0 SEPSIS DUE TO STREPTOCOCCUS PYOGENES (HCC): Status: ACTIVE | Noted: 2025-06-17

## 2025-06-17 PROBLEM — E87.6 HYPOKALEMIA: Status: ACTIVE | Noted: 2025-06-17

## 2025-06-17 PROBLEM — R11.2 NAUSEA AND VOMITING: Status: ACTIVE | Noted: 2025-06-17

## 2025-06-17 PROBLEM — T79.6XXA TRAUMATIC RHABDOMYOLYSIS: Status: ACTIVE | Noted: 2025-06-17

## 2025-06-17 PROBLEM — N17.9 AKI (ACUTE KIDNEY INJURY): Status: ACTIVE | Noted: 2025-06-17

## 2025-06-17 LAB
AFP SERPL-MCNC: <1.8 UG/L
AMPHET UR QL SCN: NEGATIVE
ANION GAP SERPL CALCULATED.3IONS-SCNC: 14 MMOL/L (ref 9–16)
ANION GAP SERPL CALCULATED.3IONS-SCNC: 16 MMOL/L (ref 9–16)
ANION GAP SERPL CALCULATED.3IONS-SCNC: 18 MMOL/L (ref 9–16)
B PARAP IS1001 DNA NPH QL NAA+NON-PROBE: NOT DETECTED
B PERT DNA SPEC QL NAA+PROBE: NOT DETECTED
BARBITURATES UR QL SCN: NEGATIVE
BENZODIAZ UR QL: NEGATIVE
BODY TEMPERATURE: 37
BUN SERPL-MCNC: 12 MG/DL (ref 6–20)
BUN SERPL-MCNC: 14 MG/DL (ref 6–20)
C PNEUM DNA NPH QL NAA+NON-PROBE: NOT DETECTED
CA-I BLD-SCNC: 0.99 MMOL/L (ref 1.13–1.33)
CALCIUM SERPL-MCNC: 7.6 MG/DL (ref 8.6–10.4)
CALCIUM SERPL-MCNC: 7.6 MG/DL (ref 8.6–10.4)
CANNABINOIDS UR QL SCN: NEGATIVE
CERULOPLASMIN SERPL-MCNC: 37 MG/DL (ref 16–45)
CHLORIDE SERPL-SCNC: 102 MMOL/L (ref 98–107)
CHLORIDE SERPL-SCNC: 97 MMOL/L (ref 98–107)
CHLORIDE SERPL-SCNC: 97 MMOL/L (ref 98–107)
CHOLEST SERPL-MCNC: 62 MG/DL (ref 0–199)
CHOLESTEROL/HDL RATIO: 6.2
CK SERPL-CCNC: ABNORMAL U/L (ref 26–192)
CO2 SERPL-SCNC: 14 MMOL/L (ref 20–31)
CO2 SERPL-SCNC: 17 MMOL/L (ref 20–31)
CO2 SERPL-SCNC: 18 MMOL/L (ref 20–31)
COCAINE UR QL SCN: NEGATIVE
COHGB MFR BLD: 1.7 % (ref 0–5)
CREAT SERPL-MCNC: 0.9 MG/DL (ref 0.6–0.9)
CREAT SERPL-MCNC: 1.1 MG/DL (ref 0.6–0.9)
ECHO AO ASC DIAM: 2.5 CM
ECHO AO ASCENDING AORTA INDEX: 1.13 CM/M2
ECHO AO ROOT DIAM: 2.4 CM
ECHO AO ROOT INDEX: 1.08 CM/M2
ECHO AV AREA PEAK VELOCITY: 1.5 CM2
ECHO AV AREA VTI: 1.7 CM2
ECHO AV AREA/BSA PEAK VELOCITY: 0.7 CM2/M2
ECHO AV AREA/BSA VTI: 0.8 CM2/M2
ECHO AV MEAN GRADIENT: 15 MMHG
ECHO AV MEAN VELOCITY: 1.8 M/S
ECHO AV PEAK GRADIENT: 30 MMHG
ECHO AV PEAK VELOCITY: 2.7 M/S
ECHO AV VELOCITY RATIO: 0.52
ECHO AV VTI: 43.5 CM
ECHO BSA: 2.41 M2
ECHO BSA: 2.41 M2
ECHO EST RA PRESSURE: 3 MMHG
ECHO IVC PROX: 1.5 CM
ECHO LA DIAMETER INDEX: 1.53 CM/M2
ECHO LA DIAMETER: 3.4 CM
ECHO LA TO AORTIC ROOT RATIO: 1.42
ECHO LV E' LATERAL VELOCITY: 17.2 CM/S
ECHO LV E' SEPTAL VELOCITY: 9.9 CM/S
ECHO LV EF PHYSICIAN: 65 %
ECHO LV FRACTIONAL SHORTENING: 52 % (ref 28–44)
ECHO LV INTERNAL DIMENSION DIASTOLE INDEX: 2.34 CM/M2
ECHO LV INTERNAL DIMENSION DIASTOLIC: 5.2 CM (ref 3.9–5.3)
ECHO LV INTERNAL DIMENSION SYSTOLIC INDEX: 1.13 CM/M2
ECHO LV INTERNAL DIMENSION SYSTOLIC: 2.5 CM
ECHO LV IVSD: 1 CM (ref 0.6–0.9)
ECHO LV MASS 2D: 194.2 G (ref 67–162)
ECHO LV MASS INDEX 2D: 87.5 G/M2 (ref 43–95)
ECHO LV POSTERIOR WALL DIASTOLIC: 1 CM (ref 0.6–0.9)
ECHO LV RELATIVE WALL THICKNESS RATIO: 0.38
ECHO LVOT AREA: 2.8 CM2
ECHO LVOT AV VTI INDEX: 0.58
ECHO LVOT DIAM: 1.9 CM
ECHO LVOT MEAN GRADIENT: 4 MMHG
ECHO LVOT PEAK GRADIENT: 8 MMHG
ECHO LVOT PEAK VELOCITY: 1.4 M/S
ECHO LVOT STROKE VOLUME INDEX: 32.2 ML/M2
ECHO LVOT SV: 71.4 ML
ECHO LVOT VTI: 25.2 CM
ECHO MV A VELOCITY: 1.02 M/S
ECHO MV AREA VTI: 2.1 CM2
ECHO MV E DECELERATION TIME (DT): 226 MS
ECHO MV E VELOCITY: 1.24 M/S
ECHO MV E/A RATIO: 1.22
ECHO MV E/E' LATERAL: 7.21
ECHO MV E/E' RATIO (AVERAGED): 9.87
ECHO MV E/E' SEPTAL: 12.53
ECHO MV LVOT VTI INDEX: 1.36
ECHO MV MAX VELOCITY: 1.5 M/S
ECHO MV MEAN GRADIENT: 4 MMHG
ECHO MV MEAN VELOCITY: 1 M/S
ECHO MV PEAK GRADIENT: 9 MMHG
ECHO MV VTI: 34.2 CM
ECHO RA AREA 4C: 13 CM2
ECHO RA END SYSTOLIC VOLUME APICAL 4 CHAMBER INDEX BSA: 13 ML/M2
ECHO RA VOLUME: 29 ML
ECHO RIGHT VENTRICULAR SYSTOLIC PRESSURE (RVSP): 19 MMHG
ECHO RV FREE WALL PEAK S': 18.3 CM/S
ECHO RV TAPSE: 2 CM (ref 1.7–?)
ECHO TV REGURGITANT MAX VELOCITY: 2.02 M/S
ECHO TV REGURGITANT PEAK GRADIENT: 16 MMHG
ERYTHROCYTE [DISTWIDTH] IN BLOOD BY AUTOMATED COUNT: 21.8 % (ref 11.8–14.4)
FENTANYL UR QL: POSITIVE
FIO2 ON VENT: ABNORMAL %
FLUAV RNA NPH QL NAA+NON-PROBE: NOT DETECTED
FLUBV RNA NPH QL NAA+NON-PROBE: NOT DETECTED
FOLATE SERPL-MCNC: 6.8 NG/ML (ref 4.8–24.2)
GFR, ESTIMATED: 66 ML/MIN/1.73M2
GFR, ESTIMATED: 84 ML/MIN/1.73M2
GLUCOSE SERPL-MCNC: 114 MG/DL (ref 74–99)
GLUCOSE SERPL-MCNC: 70 MG/DL (ref 74–99)
HADV DNA NPH QL NAA+NON-PROBE: NOT DETECTED
HAPTOGLOB SERPL-MCNC: 319 MG/DL (ref 30–200)
HAV IGM SERPL QL IA: NONREACTIVE
HBV CORE IGM SERPL QL IA: NONREACTIVE
HBV SURFACE AG SERPL QL IA: NONREACTIVE
HCO3 VENOUS: 17.5 MMOL/L (ref 24–30)
HCOV 229E RNA NPH QL NAA+NON-PROBE: NOT DETECTED
HCOV HKU1 RNA NPH QL NAA+NON-PROBE: NOT DETECTED
HCOV NL63 RNA NPH QL NAA+NON-PROBE: NOT DETECTED
HCOV OC43 RNA NPH QL NAA+NON-PROBE: NOT DETECTED
HCT VFR BLD AUTO: 22.9 % (ref 36.3–47.1)
HCT VFR BLD AUTO: 24.4 % (ref 36.3–47.1)
HCT VFR BLD AUTO: 25.5 % (ref 36.3–47.1)
HCV AB SERPL QL IA: NONREACTIVE
HDLC SERPL-MCNC: 10 MG/DL
HGB BLD-MCNC: 6.5 G/DL (ref 11.9–15.1)
HGB BLD-MCNC: 6.6 G/DL (ref 11.9–15.1)
HGB BLD-MCNC: 7.1 G/DL (ref 11.9–15.1)
HMPV RNA NPH QL NAA+NON-PROBE: NOT DETECTED
HPIV1 RNA NPH QL NAA+NON-PROBE: NOT DETECTED
HPIV2 RNA NPH QL NAA+NON-PROBE: NOT DETECTED
HPIV3 RNA NPH QL NAA+NON-PROBE: NOT DETECTED
HPIV4 RNA NPH QL NAA+NON-PROBE: NOT DETECTED
IMM RETICS NFR: 5.2 % (ref 2.7–18.3)
LACTIC ACID, SEPSIS WHOLE BLOOD: 3.6 MMOL/L (ref 0.5–1.9)
LDH SERPL-CCNC: 576 U/L (ref 135–214)
LDLC SERPL CALC-MCNC: 2 MG/DL (ref 0–100)
M PNEUMO DNA NPH QL NAA+NON-PROBE: NOT DETECTED
MAGNESIUM SERPL-MCNC: 1.5 MG/DL (ref 1.6–2.6)
MAGNESIUM SERPL-MCNC: 2.5 MG/DL (ref 1.6–2.6)
MCH RBC QN AUTO: 17 PG (ref 25.2–33.5)
MCHC RBC AUTO-ENTMCNC: 28.4 G/DL (ref 28.4–34.8)
MCV RBC AUTO: 59.8 FL (ref 82.6–102.9)
METHADONE UR QL: POSITIVE
MICROORGANISM SPEC CULT: ABNORMAL
MICROORGANISM SPEC CULT: NO GROWTH
MYOGLOBIN SERPL-MCNC: 4972 NG/ML (ref 25–58)
NEGATIVE BASE EXCESS, VEN: 5.2 MMOL/L (ref 0–2)
NRBC BLD-RTO: 0 PER 100 WBC
O2 SAT, VEN: 97.7 % (ref 60–85)
OPIATES UR QL SCN: POSITIVE
OXYCODONE UR QL SCN: NEGATIVE
PCO2 VENOUS: 24 MM HG (ref 39–55)
PCP UR QL SCN: NEGATIVE
PH VENOUS: 7.48 (ref 7.32–7.42)
PHOSPHATE SERPL-MCNC: 2.5 MG/DL (ref 2.5–4.5)
PLATELET # BLD AUTO: ABNORMAL K/UL (ref 138–453)
PLATELET, FLUORESCENCE: 138 K/UL (ref 138–453)
PLATELETS.RETICULATED NFR BLD AUTO: 7.8 % (ref 1.1–10.3)
PO2 VENOUS: 91.9 MM HG (ref 30–50)
POTASSIUM SERPL-SCNC: 2.8 MMOL/L (ref 3.7–5.3)
POTASSIUM SERPL-SCNC: 3.1 MMOL/L (ref 3.7–5.3)
POTASSIUM SERPL-SCNC: 3.4 MMOL/L (ref 3.7–5.3)
RBC # BLD AUTO: 3.83 M/UL (ref 3.95–5.11)
RETIC HEMOGLOBIN: 14.9 PG (ref 28.2–35.7)
RETICS # AUTO: 0.01 M/UL (ref 0.03–0.08)
RETICS/RBC NFR AUTO: 0.4 % (ref 0.5–1.9)
RSV RNA NPH QL NAA+NON-PROBE: NOT DETECTED
RV+EV RNA NPH QL NAA+NON-PROBE: NOT DETECTED
SARS-COV-2 RNA NPH QL NAA+NON-PROBE: NOT DETECTED
SERVICE CMNT-IMP: ABNORMAL
SERVICE CMNT-IMP: ABNORMAL
SODIUM SERPL-SCNC: 129 MMOL/L (ref 136–145)
SODIUM SERPL-SCNC: 130 MMOL/L (ref 136–145)
SODIUM SERPL-SCNC: 134 MMOL/L (ref 136–145)
SPECIMEN DESCRIPTION: ABNORMAL
SPECIMEN DESCRIPTION: ABNORMAL
SPECIMEN DESCRIPTION: NORMAL
SPECIMEN DESCRIPTION: NORMAL
TEST INFORMATION: ABNORMAL
TRANSFERRIN SERPL-MCNC: 183 MG/DL (ref 200–360)
TRIGL SERPL-MCNC: 252 MG/DL
TSH SERPL DL<=0.05 MIU/L-ACNC: 1.19 UIU/ML (ref 0.27–4.2)
VANCOMYCIN SERPL-MCNC: 13.9 UG/ML (ref 5–40)
VIT B12 SERPL-MCNC: 1755 PG/ML (ref 232–1245)
VLDLC SERPL CALC-MCNC: 50 MG/DL (ref 1–30)
WBC OTHER # BLD: 12.8 K/UL (ref 3.5–11.3)

## 2025-06-17 PROCEDURE — 6360000002 HC RX W HCPCS

## 2025-06-17 PROCEDURE — 82746 ASSAY OF FOLIC ACID SERUM: CPT

## 2025-06-17 PROCEDURE — 74177 CT ABD & PELVIS W/CONTRAST: CPT

## 2025-06-17 PROCEDURE — P9016 RBC LEUKOCYTES REDUCED: HCPCS

## 2025-06-17 PROCEDURE — 6370000000 HC RX 637 (ALT 250 FOR IP)

## 2025-06-17 PROCEDURE — 80307 DRUG TEST PRSMV CHEM ANLYZR: CPT

## 2025-06-17 PROCEDURE — 83615 LACTATE (LD) (LDH) ENZYME: CPT

## 2025-06-17 PROCEDURE — G0480 DRUG TEST DEF 1-7 CLASSES: HCPCS

## 2025-06-17 PROCEDURE — 6370000000 HC RX 637 (ALT 250 FOR IP): Performed by: NURSE PRACTITIONER

## 2025-06-17 PROCEDURE — 82105 ALPHA-FETOPROTEIN SERUM: CPT

## 2025-06-17 PROCEDURE — 85045 AUTOMATED RETICULOCYTE COUNT: CPT

## 2025-06-17 PROCEDURE — 84466 ASSAY OF TRANSFERRIN: CPT

## 2025-06-17 PROCEDURE — 2580000003 HC RX 258

## 2025-06-17 PROCEDURE — 80051 ELECTROLYTE PANEL: CPT

## 2025-06-17 PROCEDURE — 2060000000 HC ICU INTERMEDIATE R&B

## 2025-06-17 PROCEDURE — 36430 TRANSFUSION BLD/BLD COMPNT: CPT

## 2025-06-17 PROCEDURE — 6360000002 HC RX W HCPCS: Performed by: HOSPITALIST

## 2025-06-17 PROCEDURE — 6360000002 HC RX W HCPCS: Performed by: INTERNAL MEDICINE

## 2025-06-17 PROCEDURE — 80061 LIPID PANEL: CPT

## 2025-06-17 PROCEDURE — 83516 IMMUNOASSAY NONANTIBODY: CPT

## 2025-06-17 PROCEDURE — 93970 EXTREMITY STUDY: CPT | Performed by: SURGERY

## 2025-06-17 PROCEDURE — 85018 HEMOGLOBIN: CPT

## 2025-06-17 PROCEDURE — 86901 BLOOD TYPING SEROLOGIC RH(D): CPT

## 2025-06-17 PROCEDURE — 85027 COMPLETE CBC AUTOMATED: CPT

## 2025-06-17 PROCEDURE — 71260 CT THORAX DX C+: CPT

## 2025-06-17 PROCEDURE — 80179 DRUG ASSAY SALICYLATE: CPT

## 2025-06-17 PROCEDURE — 82390 ASSAY OF CERULOPLASMIN: CPT

## 2025-06-17 PROCEDURE — 6360000004 HC RX CONTRAST MEDICATION

## 2025-06-17 PROCEDURE — 82330 ASSAY OF CALCIUM: CPT

## 2025-06-17 PROCEDURE — 86225 DNA ANTIBODY NATIVE: CPT

## 2025-06-17 PROCEDURE — 82805 BLOOD GASES W/O2 SATURATION: CPT

## 2025-06-17 PROCEDURE — 86376 MICROSOMAL ANTIBODY EACH: CPT

## 2025-06-17 PROCEDURE — 82550 ASSAY OF CK (CPK): CPT

## 2025-06-17 PROCEDURE — 86900 BLOOD TYPING SEROLOGIC ABO: CPT

## 2025-06-17 PROCEDURE — 87641 MR-STAPH DNA AMP PROBE: CPT

## 2025-06-17 PROCEDURE — 0202U NFCT DS 22 TRGT SARS-COV-2: CPT

## 2025-06-17 PROCEDURE — 99254 IP/OBS CNSLTJ NEW/EST MOD 60: CPT | Performed by: INTERNAL MEDICINE

## 2025-06-17 PROCEDURE — C8929 TTE W OR WO FOL WCON,DOPPLER: HCPCS

## 2025-06-17 PROCEDURE — 85055 RETICULATED PLATELET ASSAY: CPT

## 2025-06-17 PROCEDURE — 80048 BASIC METABOLIC PNL TOTAL CA: CPT

## 2025-06-17 PROCEDURE — 84100 ASSAY OF PHOSPHORUS: CPT

## 2025-06-17 PROCEDURE — 83874 ASSAY OF MYOGLOBIN: CPT

## 2025-06-17 PROCEDURE — 80074 ACUTE HEPATITIS PANEL: CPT

## 2025-06-17 PROCEDURE — 2500000003 HC RX 250 WO HCPCS

## 2025-06-17 PROCEDURE — 83605 ASSAY OF LACTIC ACID: CPT

## 2025-06-17 PROCEDURE — 85014 HEMATOCRIT: CPT

## 2025-06-17 PROCEDURE — 83735 ASSAY OF MAGNESIUM: CPT

## 2025-06-17 PROCEDURE — 86850 RBC ANTIBODY SCREEN: CPT

## 2025-06-17 PROCEDURE — 82607 VITAMIN B-12: CPT

## 2025-06-17 PROCEDURE — 80143 DRUG ASSAY ACETAMINOPHEN: CPT

## 2025-06-17 PROCEDURE — 36415 COLL VENOUS BLD VENIPUNCTURE: CPT

## 2025-06-17 PROCEDURE — 2580000003 HC RX 258: Performed by: INTERNAL MEDICINE

## 2025-06-17 PROCEDURE — APPSS30 APP SPLIT SHARED TIME 16-30 MINUTES: Performed by: NURSE PRACTITIONER

## 2025-06-17 PROCEDURE — 99233 SBSQ HOSP IP/OBS HIGH 50: CPT

## 2025-06-17 PROCEDURE — 86920 COMPATIBILITY TEST SPIN: CPT

## 2025-06-17 PROCEDURE — 86038 ANTINUCLEAR ANTIBODIES: CPT

## 2025-06-17 PROCEDURE — 84443 ASSAY THYROID STIM HORMONE: CPT

## 2025-06-17 PROCEDURE — 99222 1ST HOSP IP/OBS MODERATE 55: CPT | Performed by: INTERNAL MEDICINE

## 2025-06-17 PROCEDURE — 80202 ASSAY OF VANCOMYCIN: CPT

## 2025-06-17 PROCEDURE — 83010 ASSAY OF HAPTOGLOBIN QUANT: CPT

## 2025-06-17 RX ORDER — MINERAL OIL/HYDROPHIL PETROLAT
OINTMENT (GRAM) TOPICAL 2 TIMES DAILY
Status: DISCONTINUED | OUTPATIENT
Start: 2025-06-17 | End: 2025-06-28 | Stop reason: HOSPADM

## 2025-06-17 RX ORDER — FENTANYL CITRATE 50 UG/ML
25 INJECTION, SOLUTION INTRAMUSCULAR; INTRAVENOUS EVERY 4 HOURS PRN
Status: DISCONTINUED | OUTPATIENT
Start: 2025-06-17 | End: 2025-06-20

## 2025-06-17 RX ORDER — MIDODRINE HYDROCHLORIDE 10 MG/1
10 TABLET ORAL 3 TIMES DAILY PRN
Status: DISCONTINUED | OUTPATIENT
Start: 2025-06-17 | End: 2025-06-28 | Stop reason: HOSPADM

## 2025-06-17 RX ORDER — POTASSIUM CHLORIDE 7.45 MG/ML
10 INJECTION INTRAVENOUS
Status: DISCONTINUED | OUTPATIENT
Start: 2025-06-17 | End: 2025-06-17

## 2025-06-17 RX ORDER — SODIUM CHLORIDE 9 MG/ML
INJECTION, SOLUTION INTRAVENOUS PRN
Status: DISCONTINUED | OUTPATIENT
Start: 2025-06-17 | End: 2025-06-28 | Stop reason: HOSPADM

## 2025-06-17 RX ORDER — POTASSIUM CHLORIDE 7.45 MG/ML
10 INJECTION INTRAVENOUS
Status: ACTIVE | OUTPATIENT
Start: 2025-06-17 | End: 2025-06-17

## 2025-06-17 RX ORDER — SCOPOLAMINE 1 MG/3D
1 PATCH, EXTENDED RELEASE TRANSDERMAL
Status: DISCONTINUED | OUTPATIENT
Start: 2025-06-17 | End: 2025-06-22

## 2025-06-17 RX ORDER — ONDANSETRON 2 MG/ML
4 INJECTION INTRAMUSCULAR; INTRAVENOUS EVERY 4 HOURS PRN
Status: DISCONTINUED | OUTPATIENT
Start: 2025-06-17 | End: 2025-06-25

## 2025-06-17 RX ORDER — IOPAMIDOL 755 MG/ML
85 INJECTION, SOLUTION INTRAVASCULAR
Status: COMPLETED | OUTPATIENT
Start: 2025-06-17 | End: 2025-06-17

## 2025-06-17 RX ORDER — MAGNESIUM SULFATE IN WATER 40 MG/ML
2000 INJECTION, SOLUTION INTRAVENOUS ONCE
Status: COMPLETED | OUTPATIENT
Start: 2025-06-17 | End: 2025-06-17

## 2025-06-17 RX ORDER — ONDANSETRON 4 MG/1
4 TABLET, ORALLY DISINTEGRATING ORAL EVERY 4 HOURS PRN
Status: DISCONTINUED | OUTPATIENT
Start: 2025-06-17 | End: 2025-06-17

## 2025-06-17 RX ADMIN — SODIUM CHLORIDE, PRESERVATIVE FREE 10 ML: 5 INJECTION INTRAVENOUS at 20:02

## 2025-06-17 RX ADMIN — SODIUM CHLORIDE, POTASSIUM CHLORIDE, SODIUM LACTATE AND CALCIUM CHLORIDE: 600; 310; 30; 20 INJECTION, SOLUTION INTRAVENOUS at 22:11

## 2025-06-17 RX ADMIN — Medication 1750 MG: at 11:13

## 2025-06-17 RX ADMIN — HEPARIN SODIUM 5000 UNITS: 5000 INJECTION INTRAVENOUS; SUBCUTANEOUS at 05:47

## 2025-06-17 RX ADMIN — MAGNESIUM SULFATE HEPTAHYDRATE 2000 MG: 40 INJECTION, SOLUTION INTRAVENOUS at 09:15

## 2025-06-17 RX ADMIN — SODIUM CHLORIDE: 0.9 INJECTION, SOLUTION INTRAVENOUS at 09:13

## 2025-06-17 RX ADMIN — POTASSIUM CHLORIDE 10 MEQ: 10 INJECTION, SOLUTION INTRAVENOUS at 22:52

## 2025-06-17 RX ADMIN — SODIUM CHLORIDE: 0.9 INJECTION, SOLUTION INTRAVENOUS at 20:15

## 2025-06-17 RX ADMIN — POTASSIUM CHLORIDE 10 MEQ: 7.46 INJECTION, SOLUTION INTRAVENOUS at 05:48

## 2025-06-17 RX ADMIN — POTASSIUM CHLORIDE 10 MEQ: 10 INJECTION, SOLUTION INTRAVENOUS at 21:41

## 2025-06-17 RX ADMIN — ACETAMINOPHEN 650 MG: 325 TABLET ORAL at 00:31

## 2025-06-17 RX ADMIN — POTASSIUM CHLORIDE 10 MEQ: 10 INJECTION, SOLUTION INTRAVENOUS at 20:01

## 2025-06-17 RX ADMIN — SULFUR HEXAFLUORIDE 2 ML: KIT at 14:04

## 2025-06-17 RX ADMIN — AMPICILLIN SODIUM 2000 MG: 2 INJECTION, POWDER, FOR SOLUTION INTRAMUSCULAR; INTRAVENOUS at 20:19

## 2025-06-17 RX ADMIN — POTASSIUM CHLORIDE 10 MEQ: 7.46 INJECTION, SOLUTION INTRAVENOUS at 04:36

## 2025-06-17 RX ADMIN — SODIUM CHLORIDE, PRESERVATIVE FREE 10 ML: 5 INJECTION INTRAVENOUS at 09:20

## 2025-06-17 RX ADMIN — HEPARIN SODIUM 5000 UNITS: 5000 INJECTION INTRAVENOUS; SUBCUTANEOUS at 20:26

## 2025-06-17 RX ADMIN — FENTANYL CITRATE 25 MCG: 50 INJECTION INTRAMUSCULAR; INTRAVENOUS at 22:29

## 2025-06-17 RX ADMIN — ONDANSETRON 4 MG: 2 INJECTION, SOLUTION INTRAMUSCULAR; INTRAVENOUS at 00:31

## 2025-06-17 RX ADMIN — SODIUM CHLORIDE, POTASSIUM CHLORIDE, SODIUM LACTATE AND CALCIUM CHLORIDE: 600; 310; 30; 20 INJECTION, SOLUTION INTRAVENOUS at 13:58

## 2025-06-17 RX ADMIN — POTASSIUM CHLORIDE 10 MEQ: 10 INJECTION, SOLUTION INTRAVENOUS at 08:29

## 2025-06-17 RX ADMIN — ACETAMINOPHEN 650 MG: 325 TABLET ORAL at 12:55

## 2025-06-17 RX ADMIN — PIPERACILLIN AND TAZOBACTAM 3375 MG: 3; .375 INJECTION, POWDER, LYOPHILIZED, FOR SOLUTION INTRAVENOUS at 00:45

## 2025-06-17 RX ADMIN — FENTANYL CITRATE 25 MCG: 50 INJECTION INTRAMUSCULAR; INTRAVENOUS at 01:09

## 2025-06-17 RX ADMIN — SODIUM CHLORIDE, PRESERVATIVE FREE 10 ML: 5 INJECTION INTRAVENOUS at 00:32

## 2025-06-17 RX ADMIN — IOPAMIDOL 85 ML: 755 INJECTION, SOLUTION INTRAVENOUS at 01:44

## 2025-06-17 RX ADMIN — IRON SUCROSE 300 MG: 20 INJECTION, SOLUTION INTRAVENOUS at 09:45

## 2025-06-17 RX ADMIN — ACETAMINOPHEN 650 MG: 325 TABLET ORAL at 20:21

## 2025-06-17 RX ADMIN — AMPICILLIN SODIUM 2000 MG: 2 INJECTION, POWDER, FOR SOLUTION INTRAMUSCULAR; INTRAVENOUS at 13:37

## 2025-06-17 RX ADMIN — POTASSIUM CHLORIDE 10 MEQ: 10 INJECTION, SOLUTION INTRAVENOUS at 23:56

## 2025-06-17 RX ADMIN — POTASSIUM CHLORIDE 10 MEQ: 7.46 INJECTION, SOLUTION INTRAVENOUS at 03:15

## 2025-06-17 RX ADMIN — WHITE PETROLATUM: 1.75 OINTMENT TOPICAL at 13:37

## 2025-06-17 RX ADMIN — PIPERACILLIN AND TAZOBACTAM 3375 MG: 3; .375 INJECTION, POWDER, LYOPHILIZED, FOR SOLUTION INTRAVENOUS at 10:17

## 2025-06-17 RX ADMIN — HEPARIN SODIUM 5000 UNITS: 5000 INJECTION INTRAVENOUS; SUBCUTANEOUS at 13:50

## 2025-06-17 RX ADMIN — FENTANYL CITRATE 25 MCG: 50 INJECTION INTRAMUSCULAR; INTRAVENOUS at 17:52

## 2025-06-17 RX ADMIN — AMPICILLIN SODIUM 2000 MG: 2 INJECTION, POWDER, FOR SOLUTION INTRAMUSCULAR; INTRAVENOUS at 23:59

## 2025-06-17 ASSESSMENT — PAIN DESCRIPTION - ORIENTATION
ORIENTATION: LEFT
ORIENTATION: LEFT;LOWER
ORIENTATION: LEFT

## 2025-06-17 ASSESSMENT — PAIN SCALES - GENERAL
PAINLEVEL_OUTOF10: 7
PAINLEVEL_OUTOF10: 0
PAINLEVEL_OUTOF10: 7
PAINLEVEL_OUTOF10: 10
PAINLEVEL_OUTOF10: 0
PAINLEVEL_OUTOF10: 0

## 2025-06-17 ASSESSMENT — PAIN DESCRIPTION - LOCATION
LOCATION: LEG

## 2025-06-17 ASSESSMENT — PAIN DESCRIPTION - DESCRIPTORS
DESCRIPTORS: ACHING
DESCRIPTORS: BURNING;DISCOMFORT;THROBBING

## 2025-06-17 ASSESSMENT — PAIN - FUNCTIONAL ASSESSMENT: PAIN_FUNCTIONAL_ASSESSMENT: ACTIVITIES ARE NOT PREVENTED

## 2025-06-17 ASSESSMENT — PAIN SCALES - WONG BAKER: WONGBAKER_NUMERICALRESPONSE: NO HURT

## 2025-06-17 NOTE — PROGRESS NOTES
[facility]  Internal Medicine Teaching Residency Program  Inpatient Daily Progress Note  ______________________________________________________________________________    Patient: Vannesa Dasilva  YOB: 1985   MRN:[id number]    Acct: [id number]     Room: 0503/0503-01  Admit date: 6/16/2025  Today's date: 06/17/25  Number of days in the hospital: 1    SUBJECTIVE   Admitting Diagnosis: Sepsis (HCC)  CC: Left leg pain and swelling    Pt examined at bedside. Chart & results reviewed.   Patient was very nauseous and vomiting overnight.  Patient had multiple episodes of vomiting which contained yellowish watery fluid.  Patient is unable to eat or drink.  GI is consulted.    This morning patient still tachycardic heart rate in 120s, fevers maximum temperature 102, blood pressure 121/67 and respiratory rate 40.    Morning labs  WBC 12.8, hemoglobin 6.5(blood transfusion), platelets 138  Sodium 129, potassium 3.4, creatinine 1.1, BUN 14  Lactic acid 3.6  CK2 4680  Blood culture 6/16/2025-Gram-positive cocci in pairs (strep pyogenes)      Plan  - Continue Vanco and Zosyn  - ID recommendation appreciated  - Continue  mL/h, for rhabdomyolysis and lactic acidosis  - Continue IV iron  - Monitor hemoglobin, keep hemoglobin above 7, transfuse as needed    BRIEF HISTORY     The patient is a pleasant 39 y.o. female with a PMHx significant for      Bilateral lower extremity lymphadenopathy  Anxiety/depression  Chronic iron deficiency anemia  Obesity hypoventilation syndrome     presents with a chief complaint of left leg pain and swelling.  According to the patient she was incarcerated yesterday and she states that she was in long term and she fell down and was left in same position for more than 2 hours.  After that she was having problem while walking.    Patient does have bilateral lower extremity nonpitting edema.  Patient denies losing consciousness or hitting her head

## 2025-06-17 NOTE — CONSULTS
[facility] Gastroenterology  Consultation Note     .  Chief Complaint:  Lower leg swelling and pain    Reason for consult:    Intractable vomiting    History of present illness:   This is a 39 y.o. female with PMH including morbid obesity, bilateral lower extremity lymphadenopathy, anxiety and depression, chronic OMID who originally presented to the ED with  complaints of bilateral lower leg swelling intractable vomiting.  Patient denies any recent fevers or chills, recent travels, or new foods.  Denies any rectal bleeding or hematemesis.   Patient states she was recently in residential, she fell down and was in same position for more than 2 hours.     Summary of imaging completed at this time:  6/17/2025 CT abdomen and pelvis with IV contrast consistent with mild hepatosplenomegaly, hepatic steatosis      Summary of current labs completed at this time:    Metabolic: Sodium 128 130, potassium 2.9-3.1, creatinine 1.1, ionized calcium 0.99, mag 1.5  Hematology: WBCs 14.1-12.8, hemoglobin 7.2-6.6, MCV 59.8, platelets 138, iron sat 3%  Coags: NA  Liver profile: ALT 49,   Cardiac profile: High-sensitivity troponin 18, proBNP 681  Total -24,680, myoglobin 4972    Previous GI history:   No previous EGD or colonoscopy  No previous GI encounters in [facility] or Care Everywhere  Primary GI specialist:    Past Medical/Social/Family History:  Past Medical History:   Diagnosis Date    History of heroin use 9/16/2020     Past Surgical History:   Procedure Laterality Date    TUBAL LIGATION       No family history on file.  Previous records/history/ and notes were reviewed    Allergies:  Allergies   Allergen Reactions    Famotidine Angioedema     Resolved with benadryl    Ibuprofen Hives     Facial swelling and hives reported by pt.        Home medications:  Prior to Admission medications    Medication Sig Start Date End Date Taking? Authorizing Provider   sertraline (ZOLOFT) 25 MG tablet take 3 tablets by mouth daily TO  WITH 100 MG DOSE TO EQUAL 175 MG DOSE 8/15/22  Yes Gustavo Jeff MD   prazosin (MINIPRESS) 2 MG capsule take 1 capsule by mouth at bedtime 8/5/22  Yes Gustavo Jeff MD   REXULTI 1 MG TABS tablet take 1 tablet by mouth once daily 8/8/22  Yes Gustavo Jeff MD   QUEtiapine (SEROQUEL) 200 MG tablet take 1 tablet by mouth at bedtime 8/15/22  Yes Gustavo Jeff MD   bumetanide (BUMEX) 2 MG tablet Take 1 tablet by mouth daily 10/18/22  Yes Kasandra Gaona MD   sertraline (ZOLOFT) 50 MG tablet TAKE 1 (EACH) TAB BY MOUTH DAILY; TAKE WITH 100MG DOSE TO EQUAL 150MG TOTAL. 12/16/21  Yes Gustavo Jeff MD   sertraline (ZOLOFT) 100 MG tablet 1 (EACH) DAILY IN THE MORNING; 8/5/21  Yes Gustavo Jeff MD   ferrous sulfate (IRON 325) 325 (65 Fe) MG tablet TAKE 1 TABLET BY MOUTH 3 TIMES DAILY (WITH MEALS) 1/13/21  Yes Kasandra Ganoa MD   methadone (DOLOPHINE) 10 MG tablet Take 12 tablets by mouth daily.   Yes Gustavo Jeff MD   traZODone (DESYREL) 50 MG tablet take 1 tablet by mouth at bedtime if needed for sleep / insomnia  Patient not taking: Reported on 6/16/2025 8/5/22   Gustavo Jeff MD   Elastic Bandages & Supports (MEDICAL COMPRESSION STOCKINGS) MISC 2 each by Does not apply route daily Right and left knee high compression stockings.  30-40 mmHg.  To be worn continuously throughout the day. 10/18/22   Kasandra Gaona MD   fluconazole (DIFLUCAN) 150 MG tablet Take 1 tablet by mouth once a week  Patient not taking: Reported on 6/16/2025 10/18/22   Kasandra Gaona MD   diphenhydrAMINE (BENADRYL) 50 MG capsule Take 1 capsule by mouth every 6 hours as needed for Itching  Patient not taking: Reported on 6/16/2025 8/31/22   Jing Squires PA-C   EPINEPHrine (EPIPEN 2-RAMEZ) 0.3 MG/0.3ML SOAJ injection Inject 0.3 mLs into the skin once for 1 dose Use as directed for allergic reaction 8/31/22 10/18/22  Jing Squires PA-C   pantoprazole (PROTONIX) 20 MG tablet

## 2025-06-17 NOTE — CONSENT
Informed Consent for Blood Component Transfusion Note    I have discussed with the patient the rationale for blood component transfusion; its benefits in treating or preventing fatigue, organ damage, or death; and its risk which includes mild transfusion reactions, rare risk of blood borne infection, or more serious but rare reactions. I have discussed the alternatives to transfusion, including the risk and consequences of not receiving transfusion. The patient had an opportunity to ask questions and had agreed to proceed with transfusion of blood components.    Electronically signed by Harmeet Gonzalez MD on 6/17/25 at 10:38 AM EDT

## 2025-06-17 NOTE — PROGRESS NOTES
[facility]   Pharmacy Pharmacokinetic Monitoring Service - Vancomycin    Consulting Provider: Dr. Wiseman   Indication: sepsis  Target Concentration: Dosing based on anticipated concentration <15 mg/L due to renal impairment/insufficiency  Day of Therapy: 2  Additional Antimicrobials:     Pertinent Laboratory Values:   Wt Readings from Last 1 Encounters:   06/16/25 (!) 137 kg (302 lb)     Temp Readings from Last 1 Encounters:   06/17/25 (!) 100.8 °F (38.2 °C) (Oral)     Estimated Creatinine Clearance: 91 mL/min (A) (based on SCr of 1.1 mg/dL (H)).  Recent Labs     06/16/25  1551 06/16/25  2238 06/17/25  0309   CREATININE 1.3* 1.0* 1.1*   BUN 17 15 14   WBC 14.1*  --  12.8*     Procalcitonin:     Pertinent Cultures:  Culture Date Source Results        MRSA Nasal Swab: N/A. Non-respiratory infection.    Recent vancomycin administrations                     vancomycin (VANCOCIN) 2,500 mg in sodium chloride 0.9 % 500 mL IVPB (mg) 2,500 mg New Bag 06/16/25 1817                    Assessment:  Date/Time Current Dose Concentration Timing of Concentration (h) AUC   6/17 By levels 13.9     Note: Serum concentrations collected for AUC dosing may appear elevated if collected in close proximity to the dose administered, this is not necessarily an indication of toxicity    Plan:  Re-dose with 1750mg once  Repeat vancomycin concentration ordered for 6/18 @ 0600   Pharmacy will continue to monitor patient and adjust therapy as indicated    Thank you for the consult,  Vinh Arzola RPH  6/17/2025 8:03 AM

## 2025-06-17 NOTE — PLAN OF CARE
Problem: Discharge Planning  Goal: Discharge to home or other facility with appropriate resources  6/17/2025 1659 by Twila Garcia RN  Outcome: Progressing  6/17/2025 0735 by Alek Man RN  Outcome: Progressing     Problem: Pain  Goal: Verbalizes/displays adequate comfort level or baseline comfort level  6/17/2025 1659 by Twila Garcia RN  Outcome: Progressing  6/17/2025 0735 by Alek Man RN  Outcome: Progressing     Problem: Skin/Tissue Integrity  Goal: Skin integrity remains intact  Description: 1.  Monitor for areas of redness and/or skin breakdown  2.  Assess vascular access sites hourly  3.  Every 4-6 hours minimum:  Change oxygen saturation probe site  4.  Every 4-6 hours:  If on nasal continuous positive airway pressure, respiratory therapy assess nares and determine need for appliance change or resting period  Outcome: Progressing     Problem: Safety - Adult  Goal: Free from fall injury  Outcome: Progressing     Problem: Chronic Conditions and Co-morbidities  Goal: Patient's chronic conditions and co-morbidity symptoms are monitored and maintained or improved  Outcome: Progressing     Problem: Cardiovascular - Adult  Goal: Maintains optimal cardiac output and hemodynamic stability  Outcome: Progressing     Problem: Gastrointestinal - Adult  Goal: Minimal or absence of nausea and vomiting  Outcome: Progressing  Goal: Maintains or returns to baseline bowel function  Outcome: Progressing

## 2025-06-17 NOTE — CARE COORDINATION
Writer to room to conduct intake assessment, patient sound asleep and did not wake to verbal stimuli, will re attempt as time allows    1540-Writer to room to conduct intake assessment, patient sound asleep and did not wake to verbal stimuli, will re attempt as time allows    1645-Writer to room to conduct intake assessment, patient sound asleep and did not wake to verbal stimuli, will re attempt as time allows

## 2025-06-17 NOTE — CONSULTS
The gallbladder, pancreas, adrenals and kidneys reveal no acute findings. GI/Bowel: There is no bowel dilatation or wall thickening identified.  Mild amount of liquid stool burden in the proximal colon.  No evidence for acute appendicitis. Pelvis: No acute findings. Peritoneum/Retroperitoneum: No free air or free fluid.  The aorta is normal in caliber.  The visceral branches are patent. No lymphadenopathy. Bones/Soft Tissues: No abnormality identified.  Small fat containing umbilical hernia. *Unless otherwise specified, incidental findings do not require dedicated imaging follow-up.     1. Limited evaluation of the pulmonary arteries due to poor opacification. No evidence for large central embolism.  If there remains high clinical suspicion for acute embolism, consider V/Q scan or repeat CT imaging. 2. No acute airspace disease identified. 3. No acute inflammatory process identified in the abdomen or pelvis. 4. Mild hepatosplenomegaly. Mild hepatic steatosis.     XR CHEST PORTABLE  Result Date: 6/16/2025  EXAMINATION: ONE XRAY VIEW OF THE CHEST 6/16/2025 4:55 pm COMPARISON: None. HISTORY: ORDERING SYSTEM PROVIDED HISTORY: Sepsis TECHNOLOGIST PROVIDED HISTORY: Sepsis FINDINGS: Normal cardiomediastinal silhouette.  No acute airspace infiltrate.  No pneumothorax or pleural effusion     No acute cardiopulmonary findings     XR KNEE LEFT (3 VIEWS)  Result Date: 6/16/2025  EXAMINATION: 3 XRAY VIEWS OF THE LEFT TIBIA AND FIBULA; THREE XRAY VIEWS OF THE LEFT KNEE 6/16/2025 4:55 pm COMPARISON: None. HISTORY: ORDERING SYSTEM PROVIDED HISTORY: left leg pain, swelling TECHNOLOGIST PROVIDED HISTORY: left leg pain, swelling; ORDERING SYSTEM PROVIDED HISTORY: left leg pain TECHNOLOGIST PROVIDED HISTORY: left leg pain FINDINGS: Left knee: Anatomic alignment.  No fracture.  No joint effusion.  Joint spaces appear preserved. Left tibia and fibula: Anatomic alignment.  Joint spaces appear unremarkable. No fracture.  No destructive  bony abnormality     No acute bony or joint abnormality     XR TIBIA FIBULA LEFT (2 VIEWS)  Result Date: 6/16/2025  EXAMINATION: 3 XRAY VIEWS OF THE LEFT TIBIA AND FIBULA; THREE XRAY VIEWS OF THE LEFT KNEE 6/16/2025 4:55 pm COMPARISON: None. HISTORY: ORDERING SYSTEM PROVIDED HISTORY: left leg pain, swelling TECHNOLOGIST PROVIDED HISTORY: left leg pain, swelling; ORDERING SYSTEM PROVIDED HISTORY: left leg pain TECHNOLOGIST PROVIDED HISTORY: left leg pain FINDINGS: Left knee: Anatomic alignment.  No fracture.  No joint effusion.  Joint spaces appear preserved. Left tibia and fibula: Anatomic alignment.  Joint spaces appear unremarkable. No fracture.  No destructive bony abnormality     No acute bony or joint abnormality       Medical Decision [id number]:     Results       Procedure Component Value Units Date/Time    Respiratory Panel, Molecular, with COVID-19 (Restricted: peds pts or suitable admitted adults) [[id number]] Collected: 06/17/25 0400    Order Status: Completed Specimen: Nasopharyngeal Swab Updated: 06/17/25 0624     Specimen Description .NASOPHARYNGEAL SWAB     Adenovirus PCR Not Detected     Coronavirus 229E PCR Not Detected     Coronavirus HKU1 PCR Not Detected     Coronavirus NL63 PCR Not Detected     Coronavirus OC43 PCR Not Detected     SARS-CoV-2, PCR Not Detected     Human Metapneumovirus PCR Not Detected     Rhino/Enterovirus PCR Not Detected     Influenza A by PCR Not Detected     Influenza B by PCR Not Detected     Parainfluenza 1 PCR Not Detected     Parainfluenza 2 PCR Not Detected     Parainfluenza 3 PCR Not Detected     Parainfluenza 4 PCR Not Detected     Resp Syncytial Virus PCR Not Detected     Bordetella parapertussis by PCR Not Detected     B Pertussis by PCR Not Detected     Chlamydia pneumoniae By PCR Not Detected     Mycoplasma pneumo by PCR Not Detected     Comment: Performed by multiplexed nucleic acid assay.       Culture, Urine [[id number]] Collected: 06/16/25 2311

## 2025-06-17 NOTE — CARE COORDINATION
Case Management Assessment  Initial Evaluation    Date/Time of Evaluation: 6/17/2025 4:52 PM  Assessment Completed by: ZOLTAN TOWNSEND    If patient is discharged prior to next notation, then this note serves as note for discharge by case management.    Patient Name: Vannesa Dasilva                   YOB: 1985  Diagnosis: Shortness of breath [R06.02]  Hypokalemia [E87.6]  HOMA (acute kidney injury) [N17.9]  Sepsis (HCC) [A41.9]  Anemia, unspecified type [D64.9]  Sepsis, due to unspecified organism, unspecified whether acute organ dysfunction present (HCC) [A41.9]                   Date / Time: 6/16/2025  3:27 PM    Patient Admission Status: Inpatient   Readmission Risk (Low < 19, Mod (19-27), High > 27): Readmission Risk Score: 16.2    Current PCP: Kasandra Gaona MD  PCP verified by CM? (P) Yes    Chart Reviewed: Yes      History Provided by: (P) Medical Record  Patient Orientation: (P) Unable to Assess (pt sleeping and does not awaken to verbal / physical stimulus)    Patient Cognition: (P) Other (see comment) (pt sleeping and does not awaken to verbal / physical stimulus)    Hospitalization in the last 30 days (Readmission):  No    If yes, Readmission Assessment in CM Navigator will be completed.    Advance Directives:      Code Status: Full Code   Patient's Primary Decision Maker is: (P) Legal Next of Kin      Discharge Planning:    Patient lives with: (P) Family Members Type of Home: (P) House  Primary Care Giver: (P) Self  Patient Support Systems include: (P) Family Members, Friends/Neighbors, Spouse/Significant Other   Current Financial resources: (P) Medicaid  Current community resources: (P) None  Current services prior to admission: (P) None            Current DME:              Type of Home Care services:  (P) None    ADLS  Prior functional level: (P) Independent in ADLs/IADLs  Current functional level: (P) Independent in ADLs/IADLs    PT AM-PAC:   /24  OT AM-PAC:   /24    Family can

## 2025-06-18 ENCOUNTER — APPOINTMENT (OUTPATIENT)
Dept: CT IMAGING | Age: 40
End: 2025-06-18
Payer: MEDICAID

## 2025-06-18 ENCOUNTER — APPOINTMENT (OUTPATIENT)
Dept: MRI IMAGING | Age: 40
End: 2025-06-18
Payer: MEDICAID

## 2025-06-18 ENCOUNTER — APPOINTMENT (OUTPATIENT)
Dept: ULTRASOUND IMAGING | Age: 40
End: 2025-06-18
Payer: MEDICAID

## 2025-06-18 LAB
ALBUMIN SERPL-MCNC: 2.7 G/DL (ref 3.5–5.2)
ALBUMIN/GLOB SERPL: 0.8 {RATIO} (ref 1–2.5)
ALP SERPL-CCNC: 85 U/L (ref 35–104)
ALT SERPL-CCNC: 83 U/L (ref 10–35)
ANA SER QL IA: NEGATIVE
ANION GAP SERPL CALCULATED.3IONS-SCNC: 11 MMOL/L (ref 9–16)
ANION GAP SERPL CALCULATED.3IONS-SCNC: 14 MMOL/L (ref 9–16)
AST SERPL-CCNC: 251 U/L (ref 10–35)
BASOPHILS # BLD: 0 K/UL (ref 0–0.2)
BASOPHILS NFR BLD: 0 % (ref 0–2)
BILIRUB SERPL-MCNC: 1.3 MG/DL (ref 0–1.2)
BUN SERPL-MCNC: 11 MG/DL (ref 6–20)
BUN SERPL-MCNC: 12 MG/DL (ref 6–20)
CALCIUM SERPL-MCNC: 7.4 MG/DL (ref 8.6–10.4)
CALCIUM SERPL-MCNC: 7.6 MG/DL (ref 8.6–10.4)
CHLORIDE SERPL-SCNC: 103 MMOL/L (ref 98–107)
CHLORIDE SERPL-SCNC: 99 MMOL/L (ref 98–107)
CK SERPL-CCNC: ABNORMAL U/L (ref 26–192)
CO2 SERPL-SCNC: 18 MMOL/L (ref 20–31)
CO2 SERPL-SCNC: 18 MMOL/L (ref 20–31)
CREAT SERPL-MCNC: 0.8 MG/DL (ref 0.6–0.9)
CREAT SERPL-MCNC: 0.8 MG/DL (ref 0.6–0.9)
DSDNA IGG SER QL IA: 0.8 IU/ML
EOSINOPHIL # BLD: 0 K/UL (ref 0–0.44)
EOSINOPHILS RELATIVE PERCENT: 0 % (ref 1–4)
ERYTHROCYTE [DISTWIDTH] IN BLOOD BY AUTOMATED COUNT: 23.2 % (ref 11.8–14.4)
ERYTHROCYTE [DISTWIDTH] IN BLOOD BY AUTOMATED COUNT: 23.5 % (ref 11.8–14.4)
GFR, ESTIMATED: >90 ML/MIN/1.73M2
GFR, ESTIMATED: >90 ML/MIN/1.73M2
GLUCOSE BLD-MCNC: 127 MG/DL (ref 65–105)
GLUCOSE SERPL-MCNC: 110 MG/DL (ref 74–99)
GLUCOSE SERPL-MCNC: 113 MG/DL (ref 74–99)
HCT VFR BLD AUTO: 25.3 % (ref 36.3–47.1)
HCT VFR BLD AUTO: 28.2 % (ref 36.3–47.1)
HCT VFR BLD AUTO: 28.5 % (ref 36.3–47.1)
HGB BLD-MCNC: 7 G/DL (ref 11.9–15.1)
HGB BLD-MCNC: 7.7 G/DL (ref 11.9–15.1)
HGB BLD-MCNC: 8 G/DL (ref 11.9–15.1)
IMM GRANULOCYTES # BLD AUTO: 0.51 K/UL (ref 0–0.3)
IMM GRANULOCYTES NFR BLD: 3 %
INTERVENTION: NORMAL
LACTIC ACID, WHOLE BLOOD: 2.1 MMOL/L (ref 0.7–2.1)
LYMPHOCYTES NFR BLD: 0.85 K/UL (ref 1.1–3.7)
LYMPHOCYTES RELATIVE PERCENT: 5 % (ref 24–43)
MCH RBC QN AUTO: 18 PG (ref 25.2–33.5)
MCH RBC QN AUTO: 18.1 PG (ref 25.2–33.5)
MCHC RBC AUTO-ENTMCNC: 27.3 G/DL (ref 28.4–34.8)
MCHC RBC AUTO-ENTMCNC: 27.7 G/DL (ref 28.4–34.8)
MCV RBC AUTO: 65.4 FL (ref 82.6–102.9)
MCV RBC AUTO: 66 FL (ref 82.6–102.9)
MITOCHONDRIA M2 IGG SER-ACNC: 1 U/ML (ref 0–4)
MONOCYTES NFR BLD: 0.51 K/UL (ref 0.1–1.2)
MONOCYTES NFR BLD: 3 % (ref 3–12)
MORPHOLOGY: ABNORMAL
MRSA, DNA, NASAL: NEGATIVE
MYOGLOBIN SERPL-MCNC: 9245 NG/ML (ref 25–58)
NEUTROPHILS NFR BLD: 89 % (ref 36–65)
NEUTS SEG NFR BLD: 15.03 K/UL (ref 1.5–8.1)
NRBC BLD-RTO: 0 PER 100 WBC
NRBC BLD-RTO: 0 PER 100 WBC
NUCLEAR IGG SER IA-RTO: 0.2 U/ML
PLATELET # BLD AUTO: ABNORMAL K/UL (ref 138–453)
PLATELET # BLD AUTO: ABNORMAL K/UL (ref 138–453)
PLATELET, FLUORESCENCE: 167 K/UL (ref 138–453)
PLATELET, FLUORESCENCE: 178 K/UL (ref 138–453)
PLATELETS.RETICULATED NFR BLD AUTO: 6.6 % (ref 1.1–10.3)
PLATELETS.RETICULATED NFR BLD AUTO: 7.7 % (ref 1.1–10.3)
POTASSIUM SERPL-SCNC: 3.6 MMOL/L (ref 3.7–5.3)
POTASSIUM SERPL-SCNC: 3.7 MMOL/L (ref 3.7–5.3)
PROT SERPL-MCNC: 6.2 G/DL (ref 6.6–8.7)
RBC # BLD AUTO: 3.87 M/UL (ref 3.95–5.11)
RBC # BLD AUTO: 4.27 M/UL (ref 3.95–5.11)
SODIUM SERPL-SCNC: 131 MMOL/L (ref 136–145)
SODIUM SERPL-SCNC: 132 MMOL/L (ref 136–145)
SPECIMEN DESCRIPTION: NORMAL
WBC OTHER # BLD: 16.8 K/UL (ref 3.5–11.3)
WBC OTHER # BLD: 16.9 K/UL (ref 3.5–11.3)

## 2025-06-18 PROCEDURE — 85027 COMPLETE CBC AUTOMATED: CPT

## 2025-06-18 PROCEDURE — 80048 BASIC METABOLIC PNL TOTAL CA: CPT

## 2025-06-18 PROCEDURE — 6360000002 HC RX W HCPCS

## 2025-06-18 PROCEDURE — 85018 HEMOGLOBIN: CPT

## 2025-06-18 PROCEDURE — 2060000000 HC ICU INTERMEDIATE R&B

## 2025-06-18 PROCEDURE — 36415 COLL VENOUS BLD VENIPUNCTURE: CPT

## 2025-06-18 PROCEDURE — A9576 INJ PROHANCE MULTIPACK: HCPCS

## 2025-06-18 PROCEDURE — 86235 NUCLEAR ANTIGEN ANTIBODY: CPT

## 2025-06-18 PROCEDURE — 2500000003 HC RX 250 WO HCPCS

## 2025-06-18 PROCEDURE — 36430 TRANSFUSION BLD/BLD COMPNT: CPT

## 2025-06-18 PROCEDURE — 6360000002 HC RX W HCPCS: Performed by: INTERNAL MEDICINE

## 2025-06-18 PROCEDURE — P9016 RBC LEUKOCYTES REDUCED: HCPCS

## 2025-06-18 PROCEDURE — 87040 BLOOD CULTURE FOR BACTERIA: CPT

## 2025-06-18 PROCEDURE — 99232 SBSQ HOSP IP/OBS MODERATE 35: CPT | Performed by: INTERNAL MEDICINE

## 2025-06-18 PROCEDURE — 83874 ASSAY OF MYOGLOBIN: CPT

## 2025-06-18 PROCEDURE — 85055 RETICULATED PLATELET ASSAY: CPT

## 2025-06-18 PROCEDURE — 2580000003 HC RX 258

## 2025-06-18 PROCEDURE — 82085 ASSAY OF ALDOLASE: CPT

## 2025-06-18 PROCEDURE — 83605 ASSAY OF LACTIC ACID: CPT

## 2025-06-18 PROCEDURE — 99232 SBSQ HOSP IP/OBS MODERATE 35: CPT | Performed by: HOSPITALIST

## 2025-06-18 PROCEDURE — APPSS30 APP SPLIT SHARED TIME 16-30 MINUTES: Performed by: NURSE PRACTITIONER

## 2025-06-18 PROCEDURE — 82550 ASSAY OF CK (CPK): CPT

## 2025-06-18 PROCEDURE — 85014 HEMATOCRIT: CPT

## 2025-06-18 PROCEDURE — 2580000003 HC RX 258: Performed by: INTERNAL MEDICINE

## 2025-06-18 PROCEDURE — 99254 IP/OBS CNSLTJ NEW/EST MOD 60: CPT | Performed by: SURGERY

## 2025-06-18 PROCEDURE — 85025 COMPLETE CBC W/AUTO DIFF WBC: CPT

## 2025-06-18 PROCEDURE — 73720 MRI LWR EXTREMITY W/O&W/DYE: CPT

## 2025-06-18 PROCEDURE — 6370000000 HC RX 637 (ALT 250 FOR IP): Performed by: NURSE PRACTITIONER

## 2025-06-18 PROCEDURE — 73723 MRI JOINT LWR EXTR W/O&W/DYE: CPT

## 2025-06-18 PROCEDURE — 80053 COMPREHEN METABOLIC PANEL: CPT

## 2025-06-18 PROCEDURE — 6360000004 HC RX CONTRAST MEDICATION

## 2025-06-18 PROCEDURE — 82947 ASSAY GLUCOSE BLOOD QUANT: CPT

## 2025-06-18 PROCEDURE — 76705 ECHO EXAM OF ABDOMEN: CPT

## 2025-06-18 PROCEDURE — 86225 DNA ANTIBODY NATIVE: CPT

## 2025-06-18 PROCEDURE — 86038 ANTINUCLEAR ANTIBODIES: CPT

## 2025-06-18 RX ORDER — GADOTERIDOL 279.3 MG/ML
20 INJECTION INTRAVENOUS
Status: COMPLETED | OUTPATIENT
Start: 2025-06-18 | End: 2025-06-18

## 2025-06-18 RX ORDER — LORAZEPAM 2 MG/ML
1 INJECTION INTRAMUSCULAR ONCE
Status: COMPLETED | OUTPATIENT
Start: 2025-06-18 | End: 2025-06-18

## 2025-06-18 RX ORDER — ALBUTEROL SULFATE 0.83 MG/ML
2.5 SOLUTION RESPIRATORY (INHALATION) EVERY 6 HOURS PRN
Status: DISCONTINUED | OUTPATIENT
Start: 2025-06-18 | End: 2025-06-28 | Stop reason: HOSPADM

## 2025-06-18 RX ORDER — SODIUM CHLORIDE 9 MG/ML
INJECTION, SOLUTION INTRAVENOUS PRN
Status: DISCONTINUED | OUTPATIENT
Start: 2025-06-18 | End: 2025-06-28 | Stop reason: HOSPADM

## 2025-06-18 RX ORDER — IOPAMIDOL 755 MG/ML
75 INJECTION, SOLUTION INTRAVASCULAR
Status: COMPLETED | OUTPATIENT
Start: 2025-06-18 | End: 2025-06-20

## 2025-06-18 RX ADMIN — GADOTERIDOL 20 ML: 279.3 INJECTION, SOLUTION INTRAVENOUS at 15:40

## 2025-06-18 RX ADMIN — AMPICILLIN SODIUM 2000 MG: 2 INJECTION, POWDER, FOR SOLUTION INTRAMUSCULAR; INTRAVENOUS at 17:09

## 2025-06-18 RX ADMIN — POTASSIUM CHLORIDE 10 MEQ: 10 INJECTION, SOLUTION INTRAVENOUS at 01:10

## 2025-06-18 RX ADMIN — AMPICILLIN SODIUM 2000 MG: 2 INJECTION, POWDER, FOR SOLUTION INTRAMUSCULAR; INTRAVENOUS at 20:34

## 2025-06-18 RX ADMIN — SODIUM CHLORIDE, POTASSIUM CHLORIDE, SODIUM LACTATE AND CALCIUM CHLORIDE: 600; 310; 30; 20 INJECTION, SOLUTION INTRAVENOUS at 17:41

## 2025-06-18 RX ADMIN — AMPICILLIN SODIUM 2000 MG: 2 INJECTION, POWDER, FOR SOLUTION INTRAMUSCULAR; INTRAVENOUS at 09:37

## 2025-06-18 RX ADMIN — WHITE PETROLATUM: 1.75 OINTMENT TOPICAL at 08:07

## 2025-06-18 RX ADMIN — HEPARIN SODIUM 5000 UNITS: 5000 INJECTION INTRAVENOUS; SUBCUTANEOUS at 04:42

## 2025-06-18 RX ADMIN — AMPICILLIN SODIUM 2000 MG: 2 INJECTION, POWDER, FOR SOLUTION INTRAMUSCULAR; INTRAVENOUS at 04:38

## 2025-06-18 RX ADMIN — FENTANYL CITRATE 25 MCG: 50 INJECTION INTRAMUSCULAR; INTRAVENOUS at 08:04

## 2025-06-18 RX ADMIN — FENTANYL CITRATE 25 MCG: 50 INJECTION INTRAMUSCULAR; INTRAVENOUS at 17:52

## 2025-06-18 RX ADMIN — LORAZEPAM 1 MG: 2 INJECTION INTRAMUSCULAR; INTRAVENOUS at 12:49

## 2025-06-18 RX ADMIN — SODIUM CHLORIDE, PRESERVATIVE FREE 10 ML: 5 INJECTION INTRAVENOUS at 08:05

## 2025-06-18 RX ADMIN — SODIUM CHLORIDE, PRESERVATIVE FREE 10 ML: 5 INJECTION INTRAVENOUS at 20:00

## 2025-06-18 RX ADMIN — POTASSIUM CHLORIDE 10 MEQ: 10 INJECTION, SOLUTION INTRAVENOUS at 02:30

## 2025-06-18 RX ADMIN — WHITE PETROLATUM: 1.75 OINTMENT TOPICAL at 01:11

## 2025-06-18 RX ADMIN — WHITE PETROLATUM: 1.75 OINTMENT TOPICAL at 20:21

## 2025-06-18 ASSESSMENT — PAIN DESCRIPTION - DESCRIPTORS
DESCRIPTORS: ACHING
DESCRIPTORS: ACHING

## 2025-06-18 ASSESSMENT — PAIN DESCRIPTION - ORIENTATION
ORIENTATION: RIGHT
ORIENTATION: LEFT

## 2025-06-18 ASSESSMENT — PAIN DESCRIPTION - LOCATION
LOCATION: LEG
LOCATION: LEG

## 2025-06-18 ASSESSMENT — PAIN SCALES - GENERAL
PAINLEVEL_OUTOF10: 9
PAINLEVEL_OUTOF10: 8

## 2025-06-18 NOTE — PROGRESS NOTES
[facility]  Internal Medicine Teaching Residency Program  Inpatient Daily Progress Note  ______________________________________________________________________________    Patient: Vannesa Dasilva  YOB: 1985   MRN:[id number]    Acct: [id number]     Room: 0503/0503-01  Admit date: 6/16/2025  Today's date: 06/18/25  Number of days in the hospital: 2    SUBJECTIVE   Admitting Diagnosis: Sepsis (HCC)  CC: Left leg pain and swelling    Pt examined at bedside. Chart & results reviewed.   No longer having emesis   Leg pain is still present and severe. Left leg is extremely tender. Pulses felt in the in foot. Can move foot and can feel sensation.   Will have MRI conducted of the leg to rule out abscess       BRIEF HISTORY     The patient is a pleasant 39 y.o. female with a PMHx significant for      Bilateral lower extremity lymphadenopathy  Anxiety/depression  Chronic iron deficiency anemia  Obesity hypoventilation syndrome     presents with a chief complaint of left leg pain and swelling.  According to the patient she was incarcerated yesterday and she states that she was in [facility] and she fell down and was left in same position for more than 2 hours.  After that she was having problem while walking.    Patient does have bilateral lower extremity nonpitting edema.  Patient denies losing consciousness or hitting her head when she fell.  Patient is not on any anticoagulation.  She denies chest pain, shortness of breath, fever, chills, rigors, cough, congestion, abdominal pain, nausea, vomiting, diarrhea, constipation.     On arrival to ED, patient was very tachycardic heart rate 140s and blood pressure 130/62.  She does have 1 episodes of fever maximum temperature recorded 101.  Initial lab was concerning for leukocytosis WBC 14.1, hemoglobin 7.2, platelets 192.  Hyponatremia sodium 129, potassium 2.7, creatinine 1.3, lactic acid 7.7, procalcitonin 4.36, mildly

## 2025-06-18 NOTE — PROGRESS NOTES
[facility]   Gastroenterology Progress Note    Vannesa Dasilva is a 39 y.o. female patient.  Hospitalization Day:2      Chief consult reason:   Intractable vomiting    Subjective:  Patient seen and examined, no acute events overnight  Patient continues to have increasing left lower leg swelling pain redness stiffness  Patient states she cannot bend her leg cannot move it independently cannot ambulate   No nausea vomiting or abdominal pain  No rectal bleeding  Myoglobin and total CK climbing-currently total CK 42,420, myoglobin 9245    VITALS:  BP (!) 147/76   Pulse (!) 104   Temp 98.6 °F (37 °C) (Oral)   Resp (!) 32   Ht 1.524 m (5')   Wt (!) 137 kg (302 lb)   SpO2 98%   BMI 58.98 kg/m²   TEMPERATURE:  Current - Temp: 98.6 °F (37 °C); Max - Temp  Av °F (37.2 °C)  Min: 98.6 °F (37 °C)  Max: 99.6 °F (37.6 °C)    Physical Assessment:  General appearance:  alert, cooperative and no distress  Mental Status:  oriented to person, place and time and normal affect  Lungs:  clear to auscultation bilaterally, normal effort  Heart:  regular rate and rhythm, no murmur  Abdomen:  soft, nontender, nondistended, normal bowel sounds, no masses, hepatomegaly, splenomegaly  Extremities: Left lower leg/foot red warm swollen  Skin:  no gross lesions, rashes, induration    Data Review:    Labs and Imaging:       CBC:  Recent Labs     25  1551 25  0309 25  0550 25  1842 25  0244 25  1025   WBC 14.1* 12.8*  --   --  16.8* 16.9*   HGB 7.2* 6.5* 6.6* 7.1* 7.7* 7.0*   MCV 62.7* 59.8*  --   --  66.0* 65.4*   RDW 21.8* 21.8*  --   --  23.5* 23.2*   PLT See Reflexed IPF Result See Reflexed IPF Result  --   --  See Reflexed IPF Result See Reflexed IPF Result       ANEMIA STUDIES:  Recent Labs     25  1739 06/17/25  1842   TIBC 279  --    FERRITIN 77  --    [id number]  --  1755*   FOLATE  --  6.8       BMP:  Recent Labs     25  0309 25  0745 25

## 2025-06-18 NOTE — PLAN OF CARE
Problem: Discharge Planning  Goal: Discharge to home or other facility with appropriate resources  Outcome: Progressing     Problem: Pain  Goal: Verbalizes/displays adequate comfort level or baseline comfort level  Outcome: Progressing  Flowsheets (Taken 6/18/2025 0440 by Bella Drew RN)  Verbalizes/displays adequate comfort level or baseline comfort level: Encourage patient to monitor pain and request assistance     Problem: Skin/Tissue Integrity  Goal: Skin integrity remains intact  Description: 1.  Monitor for areas of redness and/or skin breakdown  2.  Assess vascular access sites hourly  3.  Every 4-6 hours minimum:  Change oxygen saturation probe site  4.  Every 4-6 hours:  If on nasal continuous positive airway pressure, respiratory therapy assess nares and determine need for appliance change or resting period  Outcome: Progressing     Problem: Safety - Adult  Goal: Free from fall injury  Outcome: Progressing     Problem: Chronic Conditions and Co-morbidities  Goal: Patient's chronic conditions and co-morbidity symptoms are monitored and maintained or improved  Outcome: Progressing     Problem: Cardiovascular - Adult  Goal: Maintains optimal cardiac output and hemodynamic stability  Outcome: Progressing     Problem: Gastrointestinal - Adult  Goal: Minimal or absence of nausea and vomiting  Outcome: Progressing  Goal: Maintains or returns to baseline bowel function  Outcome: Progressing

## 2025-06-18 NOTE — PROGRESS NOTES
[facility] - Progress Note    Today's Date and Time: 6/18/2025, 2:24 PM    Impression :   Fall at senior care on 6/17/25  Down time 2 hours  Group A Strep (pyogenes) septicemia 2/2 on 6/16/25  Concern for LLE cellulitis  Low-grade fever  BLE edema  Rhabdomyolysis  HOMA  Hyponatremia  Hypokalemia  Lactic acidosis  Leukocytosis  Anemia  Elevated liver enzymes  Emesis  CHF  Obesity  Anxiety  Depression  Hx of heroin abuse-Patient denied IV use    Recommendations:   Patient is currently receiving IV Ampicillin for strep pyogenes septicemia  Discontinued vancomycin and Zosyn on 6/17/25   TTE pending for concern of CHF and to look for evidence of vegetation    Medical Decision Making/Summary/Discussion:6/18/2025         Elements of Medical Decision Making:  Note: I have independently performed the steps listed below as part of the medical decision making and evaluation.   Examined and discussed with patient.  Severe Lt leg cellulitis  Strep pyogenes septicemia 6-16-25  Rhabdomyolysis  HOMA  Lactic acidosis  Somnolence  CHF  Obesity  Prior Hx of heroin use. Currently patient denies any use  Labs, medications, radiologic studies were reviewed with personal review of films  Radiologic studies  Lab work  Cultures  Blood: Strep pyogenes  Large amounts of data were reviewed  Please see the history of present illness and progress note  Patients with Strep pyogenes in blood usually have a difficult clinical course  Will plan IV ampicillin and if necessary add gentamicin for synergy  Patient currently somnolent but wakes up and answers appropriately. No signs of meningitis noted  Discussed with nursing Staff, Discharge planner  `Dr Syed  Infection Control and Prevention measures reviewed  Universal precaution  All prior entries were reviewed  IM notes reviewed  Administer medications as ordered  Ampicillin 2 gm IV q 4 hr  Monitor clinical progression  Prognosis:   Guarded  Discharge planning  on TTE from 25  Please keep left leg wrapped and elevated    Interval History:     2025  BP (!) 147/76   Pulse (!) 104   Temp 98.6 °F (37 °C) (Oral)   Resp (!) 32   Ht 1.524 m (5')   Wt (!) 137 kg (302 lb)   SpO2 98%   BMI 58.98 kg/m²     Afebrile  VSS on 2 L NC    The patient was resting in bed upon evaluation.   She looks much better today overall, and said she feels a bit better, but her left leg does look more edematous and erythematous.  General surgery was consulted and imaging was ordered to rule our abscess.     Lab-work reviewed  CK increased to 42,420  Myoglobin 9,245  WBC increased to 16.8  Hgb 7.7    Repeat BC ordered 25    Ampicillin continues for Strep pyogenes septicemia    No other acute issues per RN    Lower extremities  25      LLE   25            Physical Examination :   Patient Vitals for the past 8 hrs:   BP Temp Temp src Pulse Resp SpO2   25 1059 (!) 147/76 98.6 °F (37 °C) Oral (!) 104 (!) 32 98 %   25 1030 -- 98.8 °F (37.1 °C) Oral -- -- --   25 0804 -- -- -- -- 23 --   25 0715 (!) 147/81 98.6 °F (37 °C) Oral 99 (!) 37 100 %     Temp (24hrs), Av.5 °F (37.5 °C), Min:98.6 °F (37 °C), Max:101.3 °F (38.5 °C)    General Appearance: Awake, alert, and in no apparent distress  Eyes: Sclera anicteric; conjunctivae pink, No hemorhages, no embolic phenomena.  ENT: Oropharynx clear, without erythema, exudate, or thrush.No tenderness of sinuses. No nasal drainage.  Neck: Supple, without lymphadenopathy. No JVD  Pulmonary/Chest: Clear to auscultation, without wheezes, rales, or rhonchi. No areas of consolidation.Good air movement bilaterally. No egophony.  Cardiovascular: Regular rate and rhythm without murmurs, rubs, or gallops. S1 and S2 normal.  Abdomen: Soft, no tenderness, bowel sounds normal  All four Extremities: Left lower extremity cellulitis with edema and redness  Neurologic: No gross sensory or motor

## 2025-06-18 NOTE — PLAN OF CARE
Problem: Discharge Planning  Goal: Discharge to home or other facility with appropriate resources  6/17/2025 2335 by Bella Drew RN  Outcome: Progressing  Flowsheets (Taken 6/17/2025 2000)  Discharge to home or other facility with appropriate resources: Arrange for needed discharge resources and transportation as appropriate  6/17/2025 1659 by Twila Garcia RN  Outcome: Progressing     Problem: Pain  Goal: Verbalizes/displays adequate comfort level or baseline comfort level  6/17/2025 2335 by Bella Drew RN  Outcome: Progressing  Flowsheets (Taken 6/17/2025 1940)  Verbalizes/displays adequate comfort level or baseline comfort level: Encourage patient to monitor pain and request assistance  6/17/2025 1659 by Twila Garcia RN  Outcome: Progressing     Problem: Skin/Tissue Integrity  Goal: Skin integrity remains intact  Description: 1.  Monitor for areas of redness and/or skin breakdown  2.  Assess vascular access sites hourly  3.  Every 4-6 hours minimum:  Change oxygen saturation probe site  4.  Every 4-6 hours:  If on nasal continuous positive airway pressure, respiratory therapy assess nares and determine need for appliance change or resting period  6/17/2025 2335 by Bella Drew RN  Outcome: Progressing  Flowsheets (Taken 6/17/2025 2000)  Skin Integrity Remains Intact: Monitor for areas of redness and/or skin breakdown  6/17/2025 1659 by Twila Garcia RN  Outcome: Progressing     Problem: Safety - Adult  Goal: Free from fall injury  6/17/2025 2335 by Bella Drew RN  Outcome: Progressing  Flowsheets (Taken 6/17/2025 2000)  Free From Fall Injury: Instruct family/caregiver on patient safety  6/17/2025 1659 by Twila Garcia RN  Outcome: Progressing     Problem: Chronic Conditions and Co-morbidities  Goal: Patient's chronic conditions and co-morbidity symptoms are monitored and maintained or improved  6/17/2025 2335 by Bella Drew RN  Outcome: Progressing  Flowsheets

## 2025-06-18 NOTE — CONSULTS
THE ABDOMEN AND PELVIS WITH CONTRAST 6/17/2025 1:24 am TECHNIQUE: CTA of the chest was performed after the administration of intravenous contrast.  Multiplanar reformatted images are provided for review.  MIP images are provided for review. Automated exposure control, iterative reconstruction, and/or weight based adjustment of the mA/kV was utilized to reduce the radiation dose to as low as reasonably achievable.; CT of the abdomen and pelvis was performed with the administration of intravenous contrast. Multiplanar reformatted images are provided for review. Automated exposure control, iterative reconstruction, and/or weight based adjustment of the mA/kV was utilized to reduce the radiation dose to as low as reasonably achievable. COMPARISON: CT abdomen pelvis 08/21/2022. HISTORY: ORDERING SYSTEM PROVIDED HISTORY: sob TECHNOLOGIST PROVIDED HISTORY: sob Additional Contrast?->1 Reason for Exam: SOB; ORDERING SYSTEM PROVIDED HISTORY: sob, sepsis TECHNOLOGIST PROVIDED HISTORY: sob, sepsis Reason for Exam: sob sepsis FINDINGS: CHEST CT: Pulmonary Arteries: 2 imaging attempts noted.  There is poor opacification of the pulmonary arteries limiting lobar and segmental branch evaluation.  No evidence for large central embolism. Mediastinum: No evidence of mediastinal lymphadenopathy.  The heart and pericardium demonstrate no acute abnormality.  There is no acute abnormality of the thoracic aorta. Lungs/pleura: Minimal subsegmental atelectasis or scarring in the medial lung bases.  No focal consolidation or pulmonary edema.  No evidence of pleural effusion or pneumothorax. Soft Tissues/Bones: No acute bone or soft tissue abnormality. ABDOMEN PELVIS: Organs: Mild hepatosplenomegaly measuring 20 and 14 cm respectively.  Mild hepatic steatosis.  The gallbladder, pancreas, adrenals and kidneys reveal no acute findings. GI/Bowel: There is no bowel dilatation or wall thickening identified.  Mild amount of liquid stool burden in the  proximal colon.  No evidence for acute appendicitis. Pelvis: No acute findings. Peritoneum/Retroperitoneum: No free air or free fluid.  The aorta is normal in caliber.  The visceral branches are patent. No lymphadenopathy. Bones/Soft Tissues: No abnormality identified.  Small fat containing umbilical hernia. *Unless otherwise specified, incidental findings do not require dedicated imaging follow-up.     1. Limited evaluation of the pulmonary arteries due to poor opacification. No evidence for large central embolism.  If there remains high clinical suspicion for acute embolism, consider V/Q scan or repeat CT imaging. 2. No acute airspace disease identified. 3. No acute inflammatory process identified in the abdomen or pelvis. 4. Mild hepatosplenomegaly. Mild hepatic steatosis.     XR CHEST PORTABLE  Result Date: 6/16/2025  EXAMINATION: ONE XRAY VIEW OF THE CHEST 6/16/2025 4:55 pm COMPARISON: None. HISTORY: ORDERING SYSTEM PROVIDED HISTORY: Sepsis TECHNOLOGIST PROVIDED HISTORY: Sepsis FINDINGS: Normal cardiomediastinal silhouette.  No acute airspace infiltrate.  No pneumothorax or pleural effusion     No acute cardiopulmonary findings     XR KNEE LEFT (3 VIEWS)  Result Date: 6/16/2025  EXAMINATION: 3 XRAY VIEWS OF THE LEFT TIBIA AND FIBULA; THREE XRAY VIEWS OF THE LEFT KNEE 6/16/2025 4:55 pm COMPARISON: None. HISTORY: ORDERING SYSTEM PROVIDED HISTORY: left leg pain, swelling TECHNOLOGIST PROVIDED HISTORY: left leg pain, swelling; ORDERING SYSTEM PROVIDED HISTORY: left leg pain TECHNOLOGIST PROVIDED HISTORY: left leg pain FINDINGS: Left knee: Anatomic alignment.  No fracture.  No joint effusion.  Joint spaces appear preserved. Left tibia and fibula: Anatomic alignment.  Joint spaces appear unremarkable. No fracture.  No destructive bony abnormality     No acute bony or joint abnormality     XR TIBIA FIBULA LEFT (2 VIEWS)  Result Date: 6/16/2025  EXAMINATION: 3 XRAY VIEWS OF THE LEFT TIBIA AND FIBULA; THREE XRAY VIEWS

## 2025-06-19 LAB
ABO/RH: NORMAL
ALBUMIN SERPL-MCNC: 2.3 G/DL (ref 3.5–5.2)
ALBUMIN/GLOB SERPL: 0.7 {RATIO} (ref 1–2.5)
ALP SERPL-CCNC: 95 U/L (ref 35–104)
ALT SERPL-CCNC: 131 U/L (ref 10–35)
ANA SER QL IA: NEGATIVE
ANION GAP SERPL CALCULATED.3IONS-SCNC: 12 MMOL/L (ref 9–16)
ANION GAP SERPL CALCULATED.3IONS-SCNC: 12 MMOL/L (ref 9–16)
ANTIBODY SCREEN: NEGATIVE
ARM BAND NUMBER: NORMAL
AST SERPL-CCNC: 387 U/L (ref 10–35)
BILIRUB SERPL-MCNC: 1.2 MG/DL (ref 0–1.2)
BLOOD BANK BLOOD PRODUCT EXPIRATION DATE: NORMAL
BLOOD BANK BLOOD PRODUCT EXPIRATION DATE: NORMAL
BLOOD BANK DISPENSE STATUS: NORMAL
BLOOD BANK DISPENSE STATUS: NORMAL
BLOOD BANK ISBT PRODUCT BLOOD TYPE: 600
BLOOD BANK ISBT PRODUCT BLOOD TYPE: 6200
BLOOD BANK PRODUCT CODE: NORMAL
BLOOD BANK PRODUCT CODE: NORMAL
BLOOD BANK SAMPLE EXPIRATION: NORMAL
BLOOD BANK UNIT TYPE AND RH: NORMAL
BLOOD BANK UNIT TYPE AND RH: NORMAL
BPU ID: NORMAL
BPU ID: NORMAL
BUN SERPL-MCNC: 8 MG/DL (ref 6–20)
BUN SERPL-MCNC: 8 MG/DL (ref 6–20)
CALCIUM SERPL-MCNC: 7.3 MG/DL (ref 8.6–10.4)
CALCIUM SERPL-MCNC: 7.4 MG/DL (ref 8.6–10.4)
CHLORIDE SERPL-SCNC: 97 MMOL/L (ref 98–107)
CHLORIDE SERPL-SCNC: 98 MMOL/L (ref 98–107)
CK SERPL-CCNC: ABNORMAL U/L (ref 26–192)
CK SERPL-CCNC: ABNORMAL U/L (ref 26–192)
CO2 SERPL-SCNC: 19 MMOL/L (ref 20–31)
CO2 SERPL-SCNC: 21 MMOL/L (ref 20–31)
COMPONENT: NORMAL
COMPONENT: NORMAL
CREAT SERPL-MCNC: 0.6 MG/DL (ref 0.6–0.9)
CREAT SERPL-MCNC: 0.7 MG/DL (ref 0.6–0.9)
CROSSMATCH RESULT: NORMAL
CROSSMATCH RESULT: NORMAL
CRP SERPL HS-MCNC: 321 MG/L (ref 0–5)
DSDNA IGG SER QL IA: 0.9 IU/ML
EKG ATRIAL RATE: 143 BPM
EKG P AXIS: -2 DEGREES
EKG P-R INTERVAL: 120 MS
EKG Q-T INTERVAL: 344 MS
EKG QRS DURATION: 88 MS
EKG QTC CALCULATION (BAZETT): 530 MS
EKG R AXIS: -7 DEGREES
EKG T AXIS: 38 DEGREES
EKG VENTRICULAR RATE: 143 BPM
ENA JO1 IGG SER-ACNC: <0.4 U/ML
ERYTHROCYTE [DISTWIDTH] IN BLOOD BY AUTOMATED COUNT: 25.5 % (ref 11.8–14.4)
GFR, ESTIMATED: >90 ML/MIN/1.73M2
GFR, ESTIMATED: >90 ML/MIN/1.73M2
GLUCOSE BLD-MCNC: 111 MG/DL (ref 65–105)
GLUCOSE BLD-MCNC: 92 MG/DL (ref 65–105)
GLUCOSE BLD-MCNC: 95 MG/DL (ref 65–105)
GLUCOSE SERPL-MCNC: 95 MG/DL (ref 74–99)
GLUCOSE SERPL-MCNC: 99 MG/DL (ref 74–99)
HCT VFR BLD AUTO: 26.9 % (ref 36.3–47.1)
HCT VFR BLD AUTO: 27.3 % (ref 36.3–47.1)
HGB BLD-MCNC: 7.7 G/DL (ref 11.9–15.1)
HGB BLD-MCNC: 7.7 G/DL (ref 11.9–15.1)
INR PPP: 1.1
LACTIC ACID, WHOLE BLOOD: 1.6 MMOL/L (ref 0.7–2.1)
LKM AB TITR SER IF: NORMAL {TITER}
MAGNESIUM SERPL-MCNC: 2.3 MG/DL (ref 1.6–2.6)
MCH RBC QN AUTO: 18.9 PG (ref 25.2–33.5)
MCHC RBC AUTO-ENTMCNC: 28.2 G/DL (ref 28.4–34.8)
MCV RBC AUTO: 67.1 FL (ref 82.6–102.9)
MYOGLOBIN SERPL-MCNC: 5761 NG/ML (ref 25–58)
MYOGLOBIN SERPL-MCNC: 6050 NG/ML (ref 25–58)
MYOGLOBIN SERPL-MCNC: 7356 NG/ML (ref 25–58)
NRBC BLD-RTO: 0 PER 100 WBC
NUCLEAR IGG SER IA-RTO: <0.1 U/ML
PLATELET # BLD AUTO: ABNORMAL K/UL (ref 138–453)
PLATELET, FLUORESCENCE: 188 K/UL (ref 138–453)
PLATELETS.RETICULATED NFR BLD AUTO: 8.4 % (ref 1.1–10.3)
POTASSIUM SERPL-SCNC: 3.3 MMOL/L (ref 3.7–5.3)
POTASSIUM SERPL-SCNC: 3.3 MMOL/L (ref 3.7–5.3)
PROCALCITONIN SERPL-MCNC: 2.57 NG/ML (ref 0–0.09)
PROT SERPL-MCNC: 5.5 G/DL (ref 6.6–8.7)
PROTHROMBIN TIME: 14.4 SEC (ref 11.7–14.9)
RBC # BLD AUTO: 4.07 M/UL (ref 3.95–5.11)
SODIUM SERPL-SCNC: 129 MMOL/L (ref 136–145)
SODIUM SERPL-SCNC: 130 MMOL/L (ref 136–145)
TRANSFUSION STATUS: NORMAL
TRANSFUSION STATUS: NORMAL
UNIT DIVISION: 0
UNIT DIVISION: 0
UNIT ISSUE DATE/TIME: NORMAL
UNIT ISSUE DATE/TIME: NORMAL
WBC OTHER # BLD: 17.9 K/UL (ref 3.5–11.3)

## 2025-06-19 PROCEDURE — 6360000002 HC RX W HCPCS

## 2025-06-19 PROCEDURE — 6360000002 HC RX W HCPCS: Performed by: INTERNAL MEDICINE

## 2025-06-19 PROCEDURE — 87040 BLOOD CULTURE FOR BACTERIA: CPT

## 2025-06-19 PROCEDURE — 84145 PROCALCITONIN (PCT): CPT

## 2025-06-19 PROCEDURE — 2500000003 HC RX 250 WO HCPCS: Performed by: INTERNAL MEDICINE

## 2025-06-19 PROCEDURE — 36415 COLL VENOUS BLD VENIPUNCTURE: CPT

## 2025-06-19 PROCEDURE — 83735 ASSAY OF MAGNESIUM: CPT

## 2025-06-19 PROCEDURE — 85610 PROTHROMBIN TIME: CPT

## 2025-06-19 PROCEDURE — 85018 HEMOGLOBIN: CPT

## 2025-06-19 PROCEDURE — 85055 RETICULATED PLATELET ASSAY: CPT

## 2025-06-19 PROCEDURE — 99232 SBSQ HOSP IP/OBS MODERATE 35: CPT | Performed by: INTERNAL MEDICINE

## 2025-06-19 PROCEDURE — 2580000003 HC RX 258

## 2025-06-19 PROCEDURE — 80053 COMPREHEN METABOLIC PANEL: CPT

## 2025-06-19 PROCEDURE — 99232 SBSQ HOSP IP/OBS MODERATE 35: CPT | Performed by: SURGERY

## 2025-06-19 PROCEDURE — 2060000000 HC ICU INTERMEDIATE R&B

## 2025-06-19 PROCEDURE — 2500000003 HC RX 250 WO HCPCS

## 2025-06-19 PROCEDURE — 86140 C-REACTIVE PROTEIN: CPT

## 2025-06-19 PROCEDURE — 80048 BASIC METABOLIC PNL TOTAL CA: CPT

## 2025-06-19 PROCEDURE — 94664 DEMO&/EVAL PT USE INHALER: CPT

## 2025-06-19 PROCEDURE — 85014 HEMATOCRIT: CPT

## 2025-06-19 PROCEDURE — 82947 ASSAY GLUCOSE BLOOD QUANT: CPT

## 2025-06-19 PROCEDURE — APPSS30 APP SPLIT SHARED TIME 16-30 MINUTES: Performed by: NURSE PRACTITIONER

## 2025-06-19 PROCEDURE — 94640 AIRWAY INHALATION TREATMENT: CPT

## 2025-06-19 PROCEDURE — 83605 ASSAY OF LACTIC ACID: CPT

## 2025-06-19 PROCEDURE — 83874 ASSAY OF MYOGLOBIN: CPT

## 2025-06-19 PROCEDURE — 99232 SBSQ HOSP IP/OBS MODERATE 35: CPT | Performed by: HOSPITALIST

## 2025-06-19 PROCEDURE — 6370000000 HC RX 637 (ALT 250 FOR IP)

## 2025-06-19 PROCEDURE — 85027 COMPLETE CBC AUTOMATED: CPT

## 2025-06-19 PROCEDURE — 82550 ASSAY OF CK (CPK): CPT

## 2025-06-19 PROCEDURE — 2580000003 HC RX 258: Performed by: INTERNAL MEDICINE

## 2025-06-19 RX ORDER — OXYCODONE HCL 5 MG/5 ML
5 SOLUTION, ORAL ORAL ONCE
Refills: 0 | Status: COMPLETED | OUTPATIENT
Start: 2025-06-19 | End: 2025-06-19

## 2025-06-19 RX ADMIN — POTASSIUM CHLORIDE 40 MEQ: 1500 TABLET, EXTENDED RELEASE ORAL at 12:03

## 2025-06-19 RX ADMIN — AMPICILLIN SODIUM 2000 MG: 2 INJECTION, POWDER, FOR SOLUTION INTRAMUSCULAR; INTRAVENOUS at 00:43

## 2025-06-19 RX ADMIN — WHITE PETROLATUM: 1.75 OINTMENT TOPICAL at 07:44

## 2025-06-19 RX ADMIN — AMPICILLIN SODIUM 2000 MG: 2 INJECTION, POWDER, FOR SOLUTION INTRAMUSCULAR; INTRAVENOUS at 04:23

## 2025-06-19 RX ADMIN — FENTANYL CITRATE 25 MCG: 50 INJECTION INTRAMUSCULAR; INTRAVENOUS at 20:31

## 2025-06-19 RX ADMIN — HEPARIN SODIUM 5000 UNITS: 5000 INJECTION INTRAVENOUS; SUBCUTANEOUS at 20:31

## 2025-06-19 RX ADMIN — AMPICILLIN SODIUM 2000 MG: 2 INJECTION, POWDER, FOR SOLUTION INTRAMUSCULAR; INTRAVENOUS at 07:46

## 2025-06-19 RX ADMIN — FENTANYL CITRATE 25 MCG: 50 INJECTION INTRAMUSCULAR; INTRAVENOUS at 12:03

## 2025-06-19 RX ADMIN — WATER 2000 MG: 1 INJECTION INTRAMUSCULAR; INTRAVENOUS; SUBCUTANEOUS at 12:03

## 2025-06-19 RX ADMIN — OXYCODONE HYDROCHLORIDE 5 MG: 5 SOLUTION ORAL at 22:26

## 2025-06-19 RX ADMIN — POTASSIUM BICARBONATE 40 MEQ: 782 TABLET, EFFERVESCENT ORAL at 05:34

## 2025-06-19 RX ADMIN — SODIUM CHLORIDE, POTASSIUM CHLORIDE, SODIUM LACTATE AND CALCIUM CHLORIDE: 600; 310; 30; 20 INJECTION, SOLUTION INTRAVENOUS at 07:43

## 2025-06-19 RX ADMIN — SODIUM CHLORIDE, POTASSIUM CHLORIDE, SODIUM LACTATE AND CALCIUM CHLORIDE: 600; 310; 30; 20 INJECTION, SOLUTION INTRAVENOUS at 00:31

## 2025-06-19 RX ADMIN — ALBUTEROL SULFATE 2.5 MG: 2.5 SOLUTION RESPIRATORY (INHALATION) at 23:27

## 2025-06-19 RX ADMIN — SODIUM CHLORIDE, PRESERVATIVE FREE 10 ML: 5 INJECTION INTRAVENOUS at 20:45

## 2025-06-19 RX ADMIN — HEPARIN SODIUM 5000 UNITS: 5000 INJECTION INTRAVENOUS; SUBCUTANEOUS at 15:03

## 2025-06-19 ASSESSMENT — PAIN SCALES - GENERAL
PAINLEVEL_OUTOF10: 7
PAINLEVEL_OUTOF10: 9
PAINLEVEL_OUTOF10: 6
PAINLEVEL_OUTOF10: 8
PAINLEVEL_OUTOF10: 8
PAINLEVEL_OUTOF10: 9

## 2025-06-19 ASSESSMENT — PAIN DESCRIPTION - ORIENTATION
ORIENTATION: LEFT

## 2025-06-19 ASSESSMENT — PAIN DESCRIPTION - LOCATION
LOCATION: LEG
LOCATION: LEG
LOCATION: LEG;ABDOMEN
LOCATION: LEG

## 2025-06-19 ASSESSMENT — PAIN DESCRIPTION - DESCRIPTORS
DESCRIPTORS: ACHING

## 2025-06-19 ASSESSMENT — PAIN SCALES - WONG BAKER: WONGBAKER_NUMERICALRESPONSE: NO HURT

## 2025-06-19 ASSESSMENT — PAIN - FUNCTIONAL ASSESSMENT
PAIN_FUNCTIONAL_ASSESSMENT: ACTIVITIES ARE NOT PREVENTED
PAIN_FUNCTIONAL_ASSESSMENT: ACTIVITIES ARE NOT PREVENTED

## 2025-06-19 NOTE — PROGRESS NOTES
and discussed them with the APRN. I have updated the medical record where necessary.    I agree with the assessment, plan and orders as documented by the APRN.    In addition diagnostic and decision making elements, performed by the Attending Physician, are included in the Diagnostic and Decision Making Section of the text.      Tae Leonard MD     6/19/2025          [facility] - Daily Progress Note  Today's Date and Time: 6/19/2025, 11:37 AM    Impression :   Fall at half-way on 6/17/25  Down time 2 hours  Group A Strep (pyogenes) septicemia 2/2 on 6/16/25  LLE cellulitis  Concern for osteomyelitis of the left proximal tibia  Rhabdomyolysis  HOMA  Hyponatremia  Hypokalemia  Lactic acidosis  Leukocytosis  Anemia  Elevated liver enzymes  CHF  Obesity  Anxiety  Depression  Hx of heroin abuse-Patient denied IV use    Recommendations:   Please follow suggestions as per Dr Leonard's recommendations:  IV Ampicillin initiated 6/17/25 for Strep Pyogenes septicemia  Repeat BC with no growth thus far  Concern for osteomyelitis of the left proximal tibia on MRI  No evidence of vegetation on TTE from 6/18/25  Please keep left leg wrapped and elevated    Interval History:     6/19/2025  /65   Pulse 95   Temp 99.3 °F (37.4 °C) (Oral)   Resp 26   Ht 1.524 m (5')   Wt (!) 137 kg (302 lb)   SpO2 98%   BMI 58.98 kg/m²     Afebrile  VSS on room air    The patient was resting in bed upon evaluation.   Dressing in place to LLE  General surgery was consulted for concern of compartment syndrome and imaging was ordered to rule our abscess.     MRI LOWER LEG 6/19/25:  1.  Extensive cellulitis.  2.  Probable superficial fasciitis.  3.  Concern for osteomyelitis of the proximal tibia.    MRI ANKLE 6/19/25:  1.  Extensive cellulitis  2.  Negative for osteomyelitis in the ankle..    Lab-work reviewed  Hyponatremia continues  CK increased to 42,420  Myoglobin 9,245-->7,356  CRP elevated at  321  AST increased to 387 and ALT to 131  WBC increased to 16.8-->17.9  Hgb 7.7    Repeat BC:-No growth thus far    Ampicillin continues for Strep pyogenes septicemia    No other acute issues per RN    Cultures:  Urine:  2025: No growth  Blood:  25: 2/2 strep pyogenes  25: No growth thus far  25: Pending     Sputum :     Wound:     MRSA Nares:  2025: negative     Lower extremities  25      LLE   25            Physical Examination :   Patient Vitals for the past 8 hrs:   BP Temp Temp src Pulse Resp SpO2   25 0739 119/65 99.3 °F (37.4 °C) Oral 95 26 98 %     Temp (24hrs), Av.8 °F (37.1 °C), Min:98.4 °F (36.9 °C), Max:99.3 °F (37.4 °C)    General Appearance: Awake, alert, and in no apparent distress  Eyes: Sclera anicteric; conjunctivae pink, No hemorhages, no embolic phenomena.  ENT: Oropharynx clear, without erythema, exudate, or thrush.No tenderness of sinuses. No nasal drainage.  Neck: Supple, without lymphadenopathy. No JVD  Pulmonary/Chest: Clear to auscultation, without wheezes, rales, or rhonchi. No areas of consolidation.Good air movement bilaterally. No egophony.  Cardiovascular: Regular rate and rhythm without murmurs, rubs, or gallops. S1 and S2 normal.  Abdomen: Soft, no tenderness, bowel sounds normal  All four Extremities: Left lower extremity cellulitis with edema and redness  Neurologic: No gross sensory or motor deficits.  Skin: Left lower extremity cellulitis. IV site inspected: no inflammation.      Medical Decision Making:   I have independently reviewed/ordered the following labs:    CBC with Differential:   Recent Labs     25  1551 25  0309 25  1025 25  2213 25  0255   WBC 14.1*   < > 16.9*  --  17.9*   HGB 7.2*   < > 7.0* 8.0* 7.7*   HCT 26.4*   < > 25.3* 28.5* 27.3*   PLT See Reflexed IPF Result   < > See Reflexed IPF Result  --  See Reflexed IPF Result   LYMPHOPCT 2*  --  5*  --   --    MONOPCT

## 2025-06-19 NOTE — PROGRESS NOTES
PROGRESS NOTE          PATIENT NAME: Vannesa Dasilva  MEDICAL RECORD NO. [id number]  DATE: 2025  SURGEON: Dr. Ta  PRIMARY CARE PHYSICIAN: Kasandra Gaona MD    HD: # 3    ASSESSMENT    Patient Active Problem List   Diagnosis    Closed comminuted fracture of waist of scaphoid bone of right wrist    Right wrist pain    Anemia    Opioid use, unspecified with other opioid-induced disorder (HCC)    Morbid obesity with BMI of 60.0-69.9, adult ([facility])    History of heroin use    Symptomatic anemia    Shortness of breath    Left leg swelling    Non-traumatic rhabdomyolysis    Iron deficiency anemia secondary to inadequate dietary iron intake    Iron malabsorption    Facial swelling    Swelling of right side of face    Recurrent major depressive disorder, in full remission    Sepsis ([facility])    Electrolyte imbalance    Elevated lactic acid level    Anxiety    Depression    Chronic acquired lymphedema    HOMA (acute kidney injury)    Hypokalemia    Sepsis due to Streptococcus pyogenes ([facility])    Traumatic rhabdomyolysis    Nausea and vomiting   39-year-old female admitted with diagnosis of left lower extremity cellulitis and elevated CK and mild    MEDICAL DECISION MAKING AND PLAN    Continue IV antibiotic  Continue Ace wrapping the left lower extremity from foot to knee joint with gentle compression.  To be done by RN   Sohail for diet  Okay for working with PT OT  Continue trending CK and Jeffery  DVT Prophylaxis-sohail for DVT prophylaxis  Continue IV fluids  General Surgery will follow.  No active surgical intervention required at present.      Chief Complaint: \"Pain in left lower extremity\"    SUBJECTIVE    Vannesa Dasilva is is unchanged since yesterday.  Complains of pain in left lower extremity.  No complaint of any fever, chills.  Able to tolerate p.o. diet  Not able to ambulate.  IV fluids running      OBJECTIVE  VITALS: Temp: Temp: 98.2 °F (36.8 °C)Temp  Av °F (37.2 °C)  Min: 98.2 °F (36.8 °C)  Max: 99.3 °F

## 2025-06-19 NOTE — PLAN OF CARE
Problem: Discharge Planning  Goal: Discharge to home or other facility with appropriate resources  6/19/2025 1724 by Jessica Chavarria RN  Outcome: Progressing  6/19/2025 0441 by Ramona Melton RN  Outcome: Progressing     Problem: Pain  Goal: Verbalizes/displays adequate comfort level or baseline comfort level  6/19/2025 1724 by Jessica Chavarria RN  Outcome: Progressing  6/19/2025 0441 by Ramona Melton RN  Outcome: Progressing     Problem: Skin/Tissue Integrity  Goal: Skin integrity remains intact  Description: 1.  Monitor for areas of redness and/or skin breakdown  2.  Assess vascular access sites hourly  3.  Every 4-6 hours minimum:  Change oxygen saturation probe site  4.  Every 4-6 hours:  If on nasal continuous positive airway pressure, respiratory therapy assess nares and determine need for appliance change or resting period  6/19/2025 1724 by Jessica Chavarria RN  Outcome: Progressing  6/19/2025 0441 by Ramona Melton RN  Outcome: Progressing     Problem: Safety - Adult  Goal: Free from fall injury  6/19/2025 1724 by Jessica Chavarria RN  Outcome: Progressing  6/19/2025 0441 by Ramona Melton RN  Outcome: Progressing     Problem: Chronic Conditions and Co-morbidities  Goal: Patient's chronic conditions and co-morbidity symptoms are monitored and maintained or improved  6/19/2025 1724 by Jessica Chavarria RN  Outcome: Progressing  6/19/2025 0441 by Ramona Melton RN  Outcome: Progressing     Problem: Cardiovascular - Adult  Goal: Maintains optimal cardiac output and hemodynamic stability  6/19/2025 1724 by Jessica Chavarria RN  Outcome: Progressing  6/19/2025 0441 by Ramona Melton RN  Outcome: Progressing     Problem: Gastrointestinal - Adult  Goal: Minimal or absence of nausea and vomiting  6/19/2025 1724 by Jessica Chavarria RN  Outcome: Progressing  6/19/2025 0441 by Ramona Melton RN  Outcome: Progressing  Goal: Maintains or returns to baseline bowel function  6/19/2025 1724 by Jessica Chavarria

## 2025-06-19 NOTE — PROGRESS NOTES
[place]   Gastroenterology Progress Note    Vannesa Dasilva is a 39 y.o. female patient.  Hospitalization Day:3      Chief consult reason:   Intractable vomiting    Subjective:  Patient seen and examined, no acute events overnight  Patient is left leg is now wrapped, elevated on a pillow, she states it is less painful, feels less swollen  Sodium 130, potassium 3.3, procalcitonin 2.57  Total CK increased slightly to 44,330, minimal improvement in myoglobin 7356  Slight improvement T. bili but not transaminase-currently , , T. bili 1.2      VITALS:  /65   Pulse 95   Temp 99.3 °F (37.4 °C) (Oral)   Resp 26   Ht 1.524 m (5')   Wt (!) 137 kg (302 lb)   SpO2 98%   BMI 58.98 kg/m²   TEMPERATURE:  Current - Temp: 99.3 °F (37.4 °C); Max - Temp  Av.8 °F (37.1 °C)  Min: 98.4 °F (36.9 °C)  Max: 99.3 °F (37.4 °C)    Physical Assessment:  General appearance:  alert, cooperative and no distress  Mental Status:  oriented to person, place and time and normal affect  Lungs:  clear to auscultation bilaterally, normal effort  Heart:  regular rate and rhythm, no murmur  Abdomen:  soft, nontender, nondistended, normal bowel sounds, no masses, hepatomegaly, splenomegaly  Extremities: Left lower leg/foot red warm swollen  Skin:  no gross lesions, rashes, induration    Data Review:    Labs and Imaging:       CBC:  Recent Labs     25  1551 25  0309 25  0550 25  1842 25  0244 25  1025 25  2213 25  0255   WBC 14.1* 12.8*  --   --  16.8* 16.9*  --  17.9*   HGB 7.2* 6.5*   < > 7.1* 7.7* 7.0* 8.0* 7.7*   MCV 62.7* 59.8*  --   --  66.0* 65.4*  --  67.1*   RDW 21.8* 21.8*  --   --  23.5* 23.2*  --  25.5*   PLT See Reflexed IPF Result See Reflexed IPF Result  --   --  See Reflexed IPF Result See Reflexed IPF Result  --  See Reflexed IPF Result    < > = values in this interval not displayed.       ANEMIA STUDIES:  Recent Labs     25  1205

## 2025-06-19 NOTE — PLAN OF CARE
Problem: Discharge Planning  Goal: Discharge to home or other facility with appropriate resources  6/19/2025 0441 by Ramoan Melton RN  Outcome: Progressing  6/18/2025 1807 by Jessica Chavarria RN  Outcome: Progressing     Problem: Pain  Goal: Verbalizes/displays adequate comfort level or baseline comfort level  6/19/2025 0441 by Ramona Melton RN  Outcome: Progressing  6/18/2025 1807 by Jessica Chavarria RN  Outcome: Progressing  Flowsheets (Taken 6/18/2025 0440 by Bella Drew RN)  Verbalizes/displays adequate comfort level or baseline comfort level: Encourage patient to monitor pain and request assistance     Problem: Skin/Tissue Integrity  Goal: Skin integrity remains intact  Description: 1.  Monitor for areas of redness and/or skin breakdown  2.  Assess vascular access sites hourly  3.  Every 4-6 hours minimum:  Change oxygen saturation probe site  4.  Every 4-6 hours:  If on nasal continuous positive airway pressure, respiratory therapy assess nares and determine need for appliance change or resting period  6/19/2025 0441 by Ramona Melton RN  Outcome: Progressing  6/18/2025 1807 by Jessica Chavarria RN  Outcome: Progressing     Problem: Safety - Adult  Goal: Free from fall injury  6/19/2025 0441 by Ramona Melton RN  Outcome: Progressing  6/18/2025 1807 by Jessica Chavarria RN  Outcome: Progressing     Problem: Chronic Conditions and Co-morbidities  Goal: Patient's chronic conditions and co-morbidity symptoms are monitored and maintained or improved  6/19/2025 0441 by Ramona Melton RN  Outcome: Progressing  6/18/2025 1807 by Jessica Chavarria RN  Outcome: Progressing     Problem: Cardiovascular - Adult  Goal: Maintains optimal cardiac output and hemodynamic stability  6/19/2025 0441 by Ramona Melton RN  Outcome: Progressing  6/18/2025 1807 by Jessica Chavarria RN  Outcome: Progressing     Problem: Gastrointestinal - Adult  Goal: Minimal or absence of nausea and vomiting  6/19/2025 0441 by

## 2025-06-19 NOTE — PLAN OF CARE
Problem: Discharge Planning  Goal: Discharge to home or other facility with appropriate resources  6/19/2025 1831 by Ling Cuevas RN  Outcome: Progressing  6/19/2025 1724 by Jessica Chavarria RN  Outcome: Progressing  6/19/2025 0441 by Ramona Melton RN  Outcome: Progressing     Problem: Pain  Goal: Verbalizes/displays adequate comfort level or baseline comfort level  6/19/2025 1831 by Ling Cuevas RN  Outcome: Progressing  6/19/2025 1724 by Jessica Chavarria RN  Outcome: Progressing  6/19/2025 0441 by Ramona Melton RN  Outcome: Progressing     Problem: Skin/Tissue Integrity  Goal: Skin integrity remains intact  Description: 1.  Monitor for areas of redness and/or skin breakdown  2.  Assess vascular access sites hourly  3.  Every 4-6 hours minimum:  Change oxygen saturation probe site  4.  Every 4-6 hours:  If on nasal continuous positive airway pressure, respiratory therapy assess nares and determine need for appliance change or resting period  6/19/2025 1831 by Ling Cuevas RN  Outcome: Progressing  6/19/2025 1724 by Jessica Chavarria RN  Outcome: Progressing  6/19/2025 0441 by Ramona Melton RN  Outcome: Progressing     Problem: Safety - Adult  Goal: Free from fall injury  6/19/2025 1831 by Ling Cuevas RN  Outcome: Progressing  6/19/2025 1724 by Jessica Chavarria RN  Outcome: Progressing  6/19/2025 0441 by Ramona Melton RN  Outcome: Progressing     Problem: Chronic Conditions and Co-morbidities  Goal: Patient's chronic conditions and co-morbidity symptoms are monitored and maintained or improved  6/19/2025 1831 by Ling Cuevas RN  Outcome: Progressing  6/19/2025 1724 by Jessica Chavarria RN  Outcome: Progressing  6/19/2025 0441 by Ramona Melton RN  Outcome: Progressing     Problem: Cardiovascular - Adult  Goal: Maintains optimal cardiac output and hemodynamic stability  6/19/2025 1831 by Ling Cuevas RN  Outcome: Progressing  6/19/2025 1724 by Jessica Chavarria RN  Outcome:

## 2025-06-19 NOTE — PROGRESS NOTES
[facility]  Internal Medicine Teaching Residency Program  Inpatient Daily Progress Note  ______________________________________________________________________________    Patient: Vannesa Dasilva  YOB: 1985   MRN:[id number]    Acct: [id number]     Room: [facility]/0503-01  Admit date: 6/16/2025  Today's date: 06/19/25  Number of days in the hospital: 3    SUBJECTIVE   Admitting Diagnosis: Sepsis (HCC)  CC: Left leg pain and swelling    Pt examined at bedside. Chart & results reviewed.   No acute events overnight    Patient is hemodynamically stable with blood pressure 128/60, afebrile currently on 2 L consistent saturation of 95%  Urine output 2.4 L/24 hours    Morning labs reviewed  WBC 17.9, hemoglobin 7.7, platelets 188,  Sodium 129, potassium 3.3, creatinine 0.7, BUN 8    Myoglobin 7356  CK 50173    Blood culture 6/16/2025-2 /2 positive for Streptococcus pyogenes  MRI left lower extremity-extensive cellulitis.  Probable superficial fasciitis.  Concern for osteomyelitis of proximal tibia.  MRI left ankle-extensive cellulitis.  Negative for osteomyelitis in ankle    Plan  - Continue ampicillin 2000 mg every 4 hours as per ID  - Continue IV fluid @150 mL/h for rhabdomyolysis  -Dark reddish/brownish urine, will monitor H&H.  Currently hemoglobin stable  - General Surgery on board, will appreciate input  - GI on board, will appreciate input.    BRIEF HISTORY     The patient is a pleasant 39 y.o. female with a PMHx significant for      Bilateral lower extremity lymphadenopathy  Anxiety/depression  Chronic iron deficiency anemia  Obesity hypoventilation syndrome     presents with a chief complaint of left leg pain and swelling.  According to the patient she was incarcerated yesterday and she states that she was in long-term and she fell down and was left in same position for more than 2 hours.  After that she was having problem while walking.    Patient does have

## 2025-06-19 NOTE — CARE COORDINATION
Case Management   Daily Progress Note       Patient Name: Vannesa Dasilva                   YOB: 1985  Diagnosis: Shortness of breath [R06.02]  Hypokalemia [E87.6]  HOMA (acute kidney injury) [N17.9]  Sepsis (HCC) [A41.9]  Anemia, unspecified type [D64.9]  Sepsis, due to unspecified organism, unspecified whether acute organ dysfunction present (HCC) [A41.9]                       GMLOS: 3.5 days  Length of Stay: 3  days    Anticipated Discharge Date: To be determined    Readmission Risk (Low < 19, Mod (19-27), High > 27): Readmission Risk Score: 16.2        Current Transitional Plan    [x] Home Independently    [] Home with HC    [] Skilled Nursing Facility    [] Acute Rehabilitation    [] Long Term Acute Care (LTAC)    [] Other:     Plan for the Stay (Medical Management) :          Workflow Continuation (Additional Notes) :  Talked with patient about IV antibiotics-may need SNF. SNF list given. Would like to go to infusion Center for IV antibiotics. Called Consuelo at [facility]- unable to accept due to history of drug use.      Priscilla Saab RN  June 19, 2025
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
## 2025-06-20 ENCOUNTER — APPOINTMENT (OUTPATIENT)
Dept: CT IMAGING | Age: 40
End: 2025-06-20
Payer: MEDICAID

## 2025-06-20 LAB
ALBUMIN SERPL-MCNC: 2.3 G/DL (ref 3.5–5.2)
ALBUMIN/GLOB SERPL: 0.7 {RATIO} (ref 1–2.5)
ALDOLASE SERPL-CCNC: 110 U/L (ref 1.2–7.6)
ALP SERPL-CCNC: 88 U/L (ref 35–104)
ALT SERPL-CCNC: 146 U/L (ref 10–35)
AMPHETAMINE: NEGATIVE NG/ML
ANION GAP SERPL CALCULATED.3IONS-SCNC: 12 MMOL/L (ref 9–16)
APAP SERPL-MCNC: <5 UG/ML (ref 10–30)
AST SERPL-CCNC: 327 U/L (ref 10–35)
BARBITURATES: NEGATIVE NG/ML
BENZODIAZEPINES: NEGATIVE NG/ML
BILIRUB SERPL-MCNC: 0.5 MG/DL (ref 0–1.2)
BUN SERPL-MCNC: 8 MG/DL (ref 6–20)
BUPRENORPHINE: NEGATIVE NG/ML
CALCIUM SERPL-MCNC: 7.4 MG/DL (ref 8.6–10.4)
CHLORIDE SERPL-SCNC: 100 MMOL/L (ref 98–107)
CK SERPL-CCNC: ABNORMAL U/L (ref 26–192)
CK SERPL-CCNC: ABNORMAL U/L (ref 26–192)
CO2 SERPL-SCNC: 20 MMOL/L (ref 20–31)
COCAINE: NEGATIVE NG/ML
CREAT SERPL-MCNC: 0.5 MG/DL (ref 0.6–0.9)
DRUGS OF ABUSE COMMENT: ABNORMAL
ERYTHROCYTE [DISTWIDTH] IN BLOOD BY AUTOMATED COUNT: 25.3 % (ref 11.8–14.4)
EST. AVERAGE GLUCOSE BLD GHB EST-MCNC: 108 MG/DL
ETHANOL PERCENT: <0.01 %
ETHANOLAMINE SERPL-MCNC: <10 MG/DL (ref 0–0.08)
GFR, ESTIMATED: >90 ML/MIN/1.73M2
GLUCOSE SERPL-MCNC: 99 MG/DL (ref 74–99)
HBA1C MFR BLD: 5.4 % (ref 4–6)
HCT VFR BLD AUTO: 26.6 % (ref 36.3–47.1)
HGB BLD-MCNC: 7.6 G/DL (ref 11.9–15.1)
LACTIC ACID, WHOLE BLOOD: 1.5 MMOL/L (ref 0.7–2.1)
MAGNESIUM SERPL-MCNC: 2 MG/DL (ref 1.6–2.6)
MCH RBC QN AUTO: 19.1 PG (ref 25.2–33.5)
MCHC RBC AUTO-ENTMCNC: 28.6 G/DL (ref 28.4–34.8)
MCV RBC AUTO: 67 FL (ref 82.6–102.9)
METHADONE: POSITIVE NG/ML
METHAMPHETAMINE: NEGATIVE NG/ML
MYOGLOBIN SERPL-MCNC: 3975 NG/ML (ref 25–58)
MYOGLOBIN SERPL-MCNC: 5708 NG/ML (ref 25–58)
MYOGLOBIN SERPL-MCNC: 6317 NG/ML (ref 25–58)
NRBC BLD-RTO: 0.2 PER 100 WBC
OPIATES: NEGATIVE NG/ML
OXYCODONE: NEGATIVE NG/ML
PHENCYCLIDINE: NEGATIVE NG/ML
PLATELET # BLD AUTO: ABNORMAL K/UL (ref 138–453)
PLATELET, FLUORESCENCE: 253 K/UL (ref 138–453)
PLATELETS.RETICULATED NFR BLD AUTO: 5.9 % (ref 1.1–10.3)
POTASSIUM SERPL-SCNC: 3.1 MMOL/L (ref 3.7–5.3)
PROT SERPL-MCNC: 5.4 G/DL (ref 6.6–8.7)
RBC # BLD AUTO: 3.97 M/UL (ref 3.95–5.11)
SALICYLATES SERPL-MCNC: <0.5 MG/DL (ref 0–10)
SMOOTH MUSCLE ANTIBODY: 9 UNITS (ref 0–19)
SODIUM SERPL-SCNC: 132 MMOL/L (ref 136–145)
THC: POSITIVE NG/ML
WBC OTHER # BLD: 17.4 K/UL (ref 3.5–11.3)

## 2025-06-20 PROCEDURE — 99232 SBSQ HOSP IP/OBS MODERATE 35: CPT | Performed by: HOSPITALIST

## 2025-06-20 PROCEDURE — 82550 ASSAY OF CK (CPK): CPT

## 2025-06-20 PROCEDURE — 6360000002 HC RX W HCPCS

## 2025-06-20 PROCEDURE — 80053 COMPREHEN METABOLIC PANEL: CPT

## 2025-06-20 PROCEDURE — 2580000003 HC RX 258

## 2025-06-20 PROCEDURE — 99231 SBSQ HOSP IP/OBS SF/LOW 25: CPT | Performed by: SURGERY

## 2025-06-20 PROCEDURE — 97530 THERAPEUTIC ACTIVITIES: CPT

## 2025-06-20 PROCEDURE — 99232 SBSQ HOSP IP/OBS MODERATE 35: CPT | Performed by: INTERNAL MEDICINE

## 2025-06-20 PROCEDURE — 36415 COLL VENOUS BLD VENIPUNCTURE: CPT

## 2025-06-20 PROCEDURE — 97166 OT EVAL MOD COMPLEX 45 MIN: CPT

## 2025-06-20 PROCEDURE — 93005 ELECTROCARDIOGRAM TRACING: CPT

## 2025-06-20 PROCEDURE — 6370000000 HC RX 637 (ALT 250 FOR IP): Performed by: HOSPITALIST

## 2025-06-20 PROCEDURE — 73701 CT LOWER EXTREMITY W/DYE: CPT

## 2025-06-20 PROCEDURE — 2500000003 HC RX 250 WO HCPCS

## 2025-06-20 PROCEDURE — 83735 ASSAY OF MAGNESIUM: CPT

## 2025-06-20 PROCEDURE — 2060000000 HC ICU INTERMEDIATE R&B

## 2025-06-20 PROCEDURE — 97535 SELF CARE MNGMENT TRAINING: CPT

## 2025-06-20 PROCEDURE — 83874 ASSAY OF MYOGLOBIN: CPT

## 2025-06-20 PROCEDURE — 83605 ASSAY OF LACTIC ACID: CPT

## 2025-06-20 PROCEDURE — 2500000003 HC RX 250 WO HCPCS: Performed by: INTERNAL MEDICINE

## 2025-06-20 PROCEDURE — 6370000000 HC RX 637 (ALT 250 FOR IP)

## 2025-06-20 PROCEDURE — 85055 RETICULATED PLATELET ASSAY: CPT

## 2025-06-20 PROCEDURE — 6370000000 HC RX 637 (ALT 250 FOR IP): Performed by: NURSE PRACTITIONER

## 2025-06-20 PROCEDURE — 85027 COMPLETE CBC AUTOMATED: CPT

## 2025-06-20 PROCEDURE — 97162 PT EVAL MOD COMPLEX 30 MIN: CPT

## 2025-06-20 PROCEDURE — 6360000004 HC RX CONTRAST MEDICATION

## 2025-06-20 PROCEDURE — 83036 HEMOGLOBIN GLYCOSYLATED A1C: CPT

## 2025-06-20 PROCEDURE — 6360000002 HC RX W HCPCS: Performed by: INTERNAL MEDICINE

## 2025-06-20 RX ORDER — OXYCODONE HCL 5 MG/5 ML
5 SOLUTION, ORAL ORAL ONCE
Status: COMPLETED | OUTPATIENT
Start: 2025-06-20 | End: 2025-06-20

## 2025-06-20 RX ORDER — FENTANYL CITRATE 50 UG/ML
25 INJECTION, SOLUTION INTRAMUSCULAR; INTRAVENOUS EVERY 4 HOURS PRN
Status: DISCONTINUED | OUTPATIENT
Start: 2025-06-20 | End: 2025-06-28 | Stop reason: HOSPADM

## 2025-06-20 RX ORDER — POTASSIUM CHLORIDE 7.45 MG/ML
10 INJECTION INTRAVENOUS ONCE
Status: COMPLETED | OUTPATIENT
Start: 2025-06-20 | End: 2025-06-20

## 2025-06-20 RX ORDER — OXYCODONE HYDROCHLORIDE 5 MG/1
5 TABLET ORAL EVERY 4 HOURS PRN
Refills: 0 | Status: DISCONTINUED | OUTPATIENT
Start: 2025-06-20 | End: 2025-06-20

## 2025-06-20 RX ADMIN — FENTANYL CITRATE 25 MCG: 50 INJECTION INTRAMUSCULAR; INTRAVENOUS at 15:49

## 2025-06-20 RX ADMIN — SERTRALINE HYDROCHLORIDE 100 MG: 50 TABLET ORAL at 11:18

## 2025-06-20 RX ADMIN — FENTANYL CITRATE 25 MCG: 50 INJECTION INTRAMUSCULAR; INTRAVENOUS at 21:54

## 2025-06-20 RX ADMIN — FENTANYL CITRATE 25 MCG: 50 INJECTION INTRAMUSCULAR; INTRAVENOUS at 00:32

## 2025-06-20 RX ADMIN — WATER 2000 MG: 1 INJECTION INTRAMUSCULAR; INTRAVENOUS; SUBCUTANEOUS at 15:36

## 2025-06-20 RX ADMIN — POTASSIUM BICARBONATE 40 MEQ: 782 TABLET, EFFERVESCENT ORAL at 11:18

## 2025-06-20 RX ADMIN — SODIUM CHLORIDE, POTASSIUM CHLORIDE, SODIUM LACTATE AND CALCIUM CHLORIDE: 600; 310; 30; 20 INJECTION, SOLUTION INTRAVENOUS at 15:21

## 2025-06-20 RX ADMIN — SODIUM CHLORIDE, PRESERVATIVE FREE 10 ML: 5 INJECTION INTRAVENOUS at 11:06

## 2025-06-20 RX ADMIN — SODIUM CHLORIDE, PRESERVATIVE FREE 10 ML: 5 INJECTION INTRAVENOUS at 21:57

## 2025-06-20 RX ADMIN — HEPARIN SODIUM 5000 UNITS: 5000 INJECTION INTRAVENOUS; SUBCUTANEOUS at 15:50

## 2025-06-20 RX ADMIN — FENTANYL CITRATE 25 MCG: 50 INJECTION INTRAMUSCULAR; INTRAVENOUS at 11:05

## 2025-06-20 RX ADMIN — WATER 2000 MG: 1 INJECTION INTRAMUSCULAR; INTRAVENOUS; SUBCUTANEOUS at 00:33

## 2025-06-20 RX ADMIN — POTASSIUM BICARBONATE 40 MEQ: 782 TABLET, EFFERVESCENT ORAL at 04:53

## 2025-06-20 RX ADMIN — IOPAMIDOL 75 ML: 755 INJECTION, SOLUTION INTRAVENOUS at 13:33

## 2025-06-20 RX ADMIN — WHITE PETROLATUM: 1.75 OINTMENT TOPICAL at 22:08

## 2025-06-20 RX ADMIN — POTASSIUM CHLORIDE 10 MEQ: 7.46 INJECTION, SOLUTION INTRAVENOUS at 11:25

## 2025-06-20 RX ADMIN — OXYCODONE HYDROCHLORIDE 5 MG: 5 SOLUTION ORAL at 04:51

## 2025-06-20 ASSESSMENT — PAIN DESCRIPTION - DESCRIPTORS
DESCRIPTORS: ACHING

## 2025-06-20 ASSESSMENT — PAIN DESCRIPTION - LOCATION
LOCATION: LEG
LOCATION: LEG
LOCATION: ABDOMEN;LEG
LOCATION: LEG

## 2025-06-20 ASSESSMENT — PAIN SCALES - GENERAL
PAINLEVEL_OUTOF10: 8
PAINLEVEL_OUTOF10: 6
PAINLEVEL_OUTOF10: 9
PAINLEVEL_OUTOF10: 8
PAINLEVEL_OUTOF10: 6
PAINLEVEL_OUTOF10: 9
PAINLEVEL_OUTOF10: 8
PAINLEVEL_OUTOF10: 6
PAINLEVEL_OUTOF10: 8

## 2025-06-20 ASSESSMENT — PAIN DESCRIPTION - ORIENTATION
ORIENTATION: LEFT

## 2025-06-20 ASSESSMENT — PAIN - FUNCTIONAL ASSESSMENT
PAIN_FUNCTIONAL_ASSESSMENT: ACTIVITIES ARE NOT PREVENTED

## 2025-06-20 NOTE — PROGRESS NOTES
PROGRESS NOTE  (Janet 2.0)        PATIENT NAME: Vannesa Dasilva  MEDICAL RECORD NO. [id number]  DATE: 2025  SURGEON: Rajiv  PRIMARY CARE PHYSICIAN: Kasandra Gaona MD    HD: # 4    ASSESSMENT    Patient Active Problem List   Diagnosis    Closed comminuted fracture of waist of scaphoid bone of right wrist    Right wrist pain    Anemia    Opioid use, unspecified with other opioid-induced disorder (HCC)    Morbid obesity with BMI of 60.0-69.9, adult ([facility])    History of heroin use    Symptomatic anemia    Shortness of breath    Left leg swelling    Non-traumatic rhabdomyolysis    Iron deficiency anemia secondary to inadequate dietary iron intake    Iron malabsorption    Facial swelling    Swelling of right side of face    Recurrent major depressive disorder, in full remission    Sepsis ([facility])    Electrolyte imbalance    Abnormal LFTs    Anxiety    Depression    Chronic acquired lymphedema    HOMA (acute kidney injury)    Hypokalemia    Sepsis due to Streptococcus pyogenes ([facility])    Traumatic rhabdomyolysis    Nausea and vomiting       MEDICAL DECISION MAKING AND PLAN    Leg wrapped       SUBJECTIVE    Vannesa Dasilva is has slightly improved since yesterday.       OBJECTIVE  VITALS: Temp: Temp: 98.4 °F (36.9 °C)Temp  Av.5 °F (36.9 °C)  Min: 98.2 °F (36.8 °C)  Max: 98.9 °F (37.2 °C) BP Systolic (24hrs), Av , Min:127 , Max:134   Diastolic (24hrs), Av, Min:66, Max:76   Pulse Pulse  Av.9  Min: 84  Max: 94 Resp Resp  Av.6  Min: 20  Max: 34 Pulse ox SpO2  Av.6 %  Min: 97 %  Max: 100 %  GENERAL: alert, no distress  NEURO: positive findings:   HEENT: Eye: positive findings:   : deferred  LUNGS: clear to ausculation, without wheezes, rales or rhonci  HEART: normal rate and regular rhythm  ABDOMEN: soft, non-tender and non-distended  EXTERMITY: no cyanosis, clubbing or edema    I/O last 3 completed shifts:  In: 1863.7 [P.O.:500; I.V.:961.6; Blood:302.1; IV Piggyback:100]  Out: 3200

## 2025-06-20 NOTE — PROGRESS NOTES
Physical Therapy  Facility/Department: [address] STEPDOWN   Physical Therapy Initial Evaluation    Patient Name: Vannesa Dasilva        MRN: [id number]    : 1985    Date of Service: 2025    Chief Complaint   Patient presents with    Leg Swelling     Past Medical History:  has a past medical history of History of heroin use.  Past Surgical History:  has a past surgical history that includes Tubal ligation.    Discharge Recommendations   Further therapy recommended at discharge.  PT Equipment Recommendations  Other: unable to amb at this time    Assessment  Body Structures, Functions, Activity Limitations Requiring Skilled Therapeutic Intervention: Decreased functional mobility , Decreased ADL status, Decreased endurance, Decreased safe awareness, Decreased strength, Decreased posture, Decreased balance, Increased pain  Assessment: Pt completed L/R rolling for pericare with maxA and use of rails. Pt required maxAx2 to progress trunk and BLE during bed mobility. Pt sat at EOB ~ 20 min with SBA. Pt is not currently at independent PLOF. Pt would benefit from continued acute care physical therapy to address these deficits  Therapy Prognosis: Good  Decision Making: Medium Complexity  Requires PT Follow-Up: Yes  Activity Tolerance  Activity Tolerance: Patient limited by pain  Activity Tolerance Comments: pain increased with bed mobility, pt returned to supine.  Safety Devices  Type of Devices: Bed alarm in place, Call light within reach, Nurse notified, Left in bed, Patient at risk for falls, All fall risk precautions in place  Restraints  Restraints Initially in Place: No    AM-PAC  AM-PAC Basic Mobility - Inpatient   How much help is needed turning from your back to your side while in a flat bed without using bedrails?: A Lot  How much help is needed moving from lying on your back to sitting on the side of a flat bed without using bedrails?: A Lot  How much help is needed moving to and from a bed to a chair?:  CGA.    Minutes  PT Individual Minutes  Time In: 0835  Time Out: 0910  Minutes: 35  Time Code Minutes  Timed Code Treatment Minutes: 23 Minutes    Electronically signed by ASHLEY Abreu on 6/20/25 at 12:32 PM EDT    This treatment/evaluation completed by signing SPT. Signing PT agrees with treatment and documentation.

## 2025-06-20 NOTE — PROGRESS NOTES
Occupational Therapy  Occupational Therapy Initial Evaluation  Facility/Department: [facility] 5C STEPDOWN   Patient Name: Vannesa Dasilva        MRN: [id number]    : 1985    Date of Service: 2025    Chief Complaint   Patient presents with    Leg Swelling   Copied from Internal Medicine:  resents with a chief complaint of left leg pain and swelling.  According to the patient she was incarcerated yesterday and she states that she was in [facility] and she fell down and was left in same position for more than 2 hours.  After that she was having problem while walking.    Patient does have bilateral lower extremity nonpitting edema.  Patient denies losing consciousness or hitting her head when she fell.  Patient is not on any anticoagulation.  She denies chest pain, shortness of breath, fever, chills, rigors, cough, congestion, abdominal pain, nausea, vomiting, diarrhea, constipation.     On arrival to ED, patient was very tachycardic heart rate 140s and blood pressure 130/62.  She does have 1 episodes of fever maximum temperature recorded 101.  Initial lab was concerning for leukocytosis WBC 14.1, hemoglobin 7.2, platelets 192.  Hyponatremia sodium 129, potassium 2.7, creatinine 1.3, lactic acid 7.7, procalcitonin 4.36, mildly elevated proBNP 681, troponin 19.  X-ray of left foot unremarkable.  Also x-ray of chest unremarkable.  Past Medical History:  has a past medical history of History of heroin use.  Past Surgical History:  has a past surgical history that includes Tubal ligation.    Discharge Recommendations  Discharge Recommendations: Patient would benefit from continued therapy after discharge  OT Equipment Recommendations  Equipment Needed:  (CTA)    Assessment  Pt lying supine in bed upon entrance to room. Pt completed bed mobility with mod assistance. Pt requires an extended time to complete due to increased LLE pain. Pt sat at eob 2 minutes with fair unsupported sitting balance, pt quickly retired to supine  Dem Mod I with adl performance with use of AE as needed  Short Term Goal 5: Dem a 6 min static/dynamic standing balance task with MOD I to increase activity tolerance for ADL'S/IADLS with LRAD  Short Term Goal 6: Dem I with bed mobility for initiation of adl tasks  Short Term Goal 7: Identify 2 non pharmaceutical pain relieving tech to assist with pain management for successful completion of adls    Plan  Occupational Therapy Plan  Times Per Week: 4-5 x week  Current Treatment Recommendations: Strengthening, Balance training, Functional mobility training, Endurance training, Positioning, Equipment evaluation, education, & procurement, Patient/Caregiver education & training, Safety education & training, Pain management, Self-Care / ADL, Home management training, Co-Treatment    Minutes  OT Individual Minutes  Time In: 1027  Time Out: 1107  Minutes: 40  Time Code Minutes   Timed Code Treatment Minutes: 38 Minutes    Electronically signed by JERONIMO HERNANDEZ/L on 6/20/25 at 11:37 AM EDT

## 2025-06-20 NOTE — PLAN OF CARE
Problem: Discharge Planning  Goal: Discharge to home or other facility with appropriate resources  6/20/2025 0302 by Jp Cameron RN  Outcome: Progressing  Flowsheets (Taken 6/19/2025 2000)  Discharge to home or other facility with appropriate resources:   Identify barriers to discharge with patient and caregiver   Arrange for needed discharge resources and transportation as appropriate   Identify discharge learning needs (meds, wound care, etc)   Refer to discharge planning if patient needs post-hospital services based on physician order or complex needs related to functional status, cognitive ability or social support system  6/19/2025 1831 by Ling Cuevas RN  Outcome: Progressing  6/19/2025 1724 by Jessica Chavarria RN  Outcome: Progressing     Problem: Pain  Goal: Verbalizes/displays adequate comfort level or baseline comfort level  6/20/2025 0302 by Jp Cameron RN  Outcome: Progressing  6/19/2025 1831 by Ling Cuevas RN  Outcome: Progressing  6/19/2025 1724 by Jessica Chavarria RN  Outcome: Progressing     Problem: Skin/Tissue Integrity  Goal: Skin integrity remains intact  Description: 1.  Monitor for areas of redness and/or skin breakdown  2.  Assess vascular access sites hourly  3.  Every 4-6 hours minimum:  Change oxygen saturation probe site  4.  Every 4-6 hours:  If on nasal continuous positive airway pressure, respiratory therapy assess nares and determine need for appliance change or resting period  6/20/2025 0302 by Jp Cameron RN  Outcome: Progressing  Flowsheets  Taken 6/20/2025 0241  Skin Integrity Remains Intact:   Monitor for areas of redness and/or skin breakdown   Assess vascular access sites hourly   Every 4-6 hours minimum:  Change oxygen saturation probe site  Taken 6/19/2025 2000  Skin Integrity Remains Intact:   Monitor for areas of redness and/or skin breakdown   Assess vascular access sites hourly   Every 4-6 hours minimum:  Change oxygen saturation probe site  6/19/2025 1831 by

## 2025-06-20 NOTE — PROGRESS NOTES
.[facility] - Progress Note    Today's Date and Time: 6/20/2025, 12:13 PM    Impression :   Fall at senior care on 6/17/25  Down time 2 hours  Group A Strep (pyogenes) septicemia 2/2 on 6/16/25  Concern for LLE cellulitis  Low-grade fever  BLE edema  Rhabdomyolysis  HOMA  Hyponatremia  Hypokalemia  Lactic acidosis  Leukocytosis  Anemia  Elevated liver enzymes  Emesis  CHF  Obesity  Anxiety  Depression  Hx of heroin abuse-Patient denied IV use    Recommendations:   Patient is currently receiving IV ceftriaxone for strep pyogenes septicemia  Discontinued vancomycin and Zosyn on 6/17/25   Discontinued ampicillin on 6-19-25 to reduce fluid and Na loads as patient is on high volume fluids because of rhabdomyolysis    Medical Decision Making/Summary/Discussion:6/20/2025         Elements of Medical Decision Making:  Note: I have independently performed the steps listed below as part of the medical decision making and evaluation.   Examined and discussed with patient.  Severe Lt leg cellulitis  Strep pyogenes septicemia 6-16-25  Rhabdomyolysis  HOMA  Lactic acidosis  Somnolence  CHF  Obesity  Prior Hx of heroin use. Currently patient denies any use  Labs, medications, radiologic studies were reviewed with personal review of films  Radiologic studies  Lab work  Cultures  Blood: Strep pyogenes  Large amounts of data were reviewed  Please see the history of present illness and progress note  Patients with Strep pyogenes in blood usually have a difficult clinical course  Will plan IV ampicillin and if necessary add gentamicin for synergy  Patient currently somnolent but wakes up and answers appropriately. No signs of meningitis noted  Discussed with nursing Staff, Discharge planner  `Dr Syed  Infection Control and Prevention measures reviewed  Universal precaution  All prior entries were reviewed  IM notes reviewed  Administer medications as ordered  Ampicillin 2 gm IV q 4 hr  Monitor  ORDERING SYSTEM PROVIDED HISTORY: left leg pain TECHNOLOGIST PROVIDED HISTORY: left leg pain FINDINGS: Left knee: Anatomic alignment.  No fracture.  No joint effusion.  Joint spaces appear preserved. Left tibia and fibula: Anatomic alignment.  Joint spaces appear unremarkable. No fracture.  No destructive bony abnormality     No acute bony or joint abnormality       Medical Decision Frtatm-Axrqpulv-Nkcbk:     Results       Procedure Component Value Units Date/Time    Culture, Blood 2 [[id number]] Collected: 06/19/25 0619    Order Status: Completed Specimen: Blood Updated: 06/20/25 0702     Specimen Description .BLOOD     Special Requests LEFT HAND 1 ML     Culture NO GROWTH 1 DAY    Culture, Blood 1 [[id number]] Collected: 06/19/25 0615    Order Status: Completed Specimen: Blood Updated: 06/20/25 0701     Specimen Description .BLOOD     Special Requests R HAND 4 ML     Culture NO GROWTH 1 DAY    Culture, Blood 1 [[id number]] Collected: 06/19/25 0308    Order Status: Completed Specimen: Blood Updated: 06/20/25 0319     Specimen Description .BLOOD     Special Requests RIGHT HAND 1ML     Culture NO GROWTH 1 DAY    Culture, Blood 1 [[id number]] Collected: 06/19/25 0306    Order Status: Completed Specimen: Blood Updated: 06/20/25 0320     Specimen Description .BLOOD     Special Requests LEFT HAND 1ML     Culture NO GROWTH 1 DAY    Culture, Blood 2 [[id number]] Collected: 06/18/25 1024    Order Status: Completed Specimen: Blood Updated: 06/20/25 1047     Specimen Description .BLOOD     Special Requests L HAND 4ML     Culture NO GROWTH 2 DAYS    Culture, Blood 1 [[id number]] Collected: 06/18/25 0653    Order Status: Completed Specimen: Blood Updated: 06/20/25 0846     Specimen Description .BLOOD     Special Requests LEFT HAND     Culture NO GROWTH 2 DAYS    Culture, Blood 1 [[id number]]     Order Status: Canceled Specimen: Blood     Culture, Blood 1 [[id number]]     Order Status: Canceled Specimen: Blood     Culture, Blood

## 2025-06-20 NOTE — PROGRESS NOTES
[facility]  Internal Medicine Teaching Residency Program  Inpatient Daily Progress Note  ______________________________________________________________________________    Patient: Vannesa Dasilva  YOB: 1985   MRN:[id number]    Acct: [id number]     Room: 0539/0539-01  Admit date: 6/16/2025  Today's date: 06/20/25  Number of days in the hospital: 4    SUBJECTIVE   Admitting Diagnosis: Sepsis (HCC)  CC: Left leg pain and swelling    Pt examined at bedside. Chart & results reviewed.   No acute events overnight    Patient is hemodynamically stable with blood pressure 137/67, afebrile currently on room air, saturation of 95%   urine output 1.6 L/24 hours    Morning labs reviewed  WBC 17.4, hemoglobin 7.6, platelets 253  CK 85813, myoglobin 5708  Sodium 132, potassium 3.1, creatinine 0.5, BUN 8    Blood culture 6/16/2025-2 /2 positive for Streptococcus pyogenes    MRI left lower extremity-extensive cellulitis.  Probable superficial fasciitis.  Concern for osteomyelitis of proximal tibia.  MRI left ankle-extensive cellulitis.  Negative for osteomyelitis in ankle    Plan  - Patient was on ampicillin which is switched to ceftriaxone as per ID  - Continue IV fluid @150 mL/h for rhabdomyolysis (CK downtrending)  -Dark reddish/brownish urine, will monitor H&H.  Currently hemoglobin stable  - General Surgery on board, will appreciate input  - GI on board, will appreciate input.    BRIEF HISTORY     The patient is a pleasant 39 y.o. female with a PMHx significant for      Bilateral lower extremity lymphadenopathy  Anxiety/depression  Chronic iron deficiency anemia  Obesity hypoventilation syndrome     presents with a chief complaint of left leg pain and swelling.  According to the patient she was incarcerated yesterday and she states that she was in [facility] and she fell down and was left in same position for more than 2 hours.  After that she was having problem while

## 2025-06-21 ENCOUNTER — APPOINTMENT (OUTPATIENT)
Dept: VASCULAR LAB | Age: 40
End: 2025-06-21
Payer: MEDICAID

## 2025-06-21 LAB
ALBUMIN SERPL-MCNC: 2.1 G/DL (ref 3.5–5.2)
ALBUMIN/GLOB SERPL: 0.6 {RATIO} (ref 1–2.5)
ALP SERPL-CCNC: 88 U/L (ref 35–104)
ALT SERPL-CCNC: 157 U/L (ref 10–35)
ANION GAP SERPL CALCULATED.3IONS-SCNC: 12 MMOL/L (ref 9–16)
AST SERPL-CCNC: 252 U/L (ref 10–35)
BILIRUB SERPL-MCNC: 0.5 MG/DL (ref 0–1.2)
BUN SERPL-MCNC: 8 MG/DL (ref 6–20)
CALCIUM SERPL-MCNC: 7.7 MG/DL (ref 8.6–10.4)
CHLORIDE SERPL-SCNC: 99 MMOL/L (ref 98–107)
CK SERPL-CCNC: ABNORMAL U/L (ref 26–192)
CO2 SERPL-SCNC: 22 MMOL/L (ref 20–31)
CREAT SERPL-MCNC: 0.4 MG/DL (ref 0.6–0.9)
ERYTHROCYTE [DISTWIDTH] IN BLOOD BY AUTOMATED COUNT: 26.4 % (ref 11.8–14.4)
GFR, ESTIMATED: >90 ML/MIN/1.73M2
GLUCOSE SERPL-MCNC: 93 MG/DL (ref 74–99)
HCT VFR BLD AUTO: 28.7 % (ref 36.3–47.1)
HGB BLD-MCNC: 7.6 G/DL (ref 11.9–15.1)
MAGNESIUM SERPL-MCNC: 1.9 MG/DL (ref 1.6–2.6)
MCH RBC QN AUTO: 19.2 PG (ref 25.2–33.5)
MCHC RBC AUTO-ENTMCNC: 26.5 G/DL (ref 28.4–34.8)
MCV RBC AUTO: 72.7 FL (ref 82.6–102.9)
MYOGLOBIN SERPL-MCNC: 3519 NG/ML (ref 25–58)
MYOGLOBIN SERPL-MCNC: 4040 NG/ML (ref 25–58)
MYOGLOBIN SERPL-MCNC: 5253 NG/ML (ref 25–58)
NRBC BLD-RTO: 0.2 PER 100 WBC
PLATELET # BLD AUTO: ABNORMAL K/UL (ref 138–453)
PLATELET, FLUORESCENCE: 291 K/UL (ref 138–453)
PLATELETS.RETICULATED NFR BLD AUTO: 6.6 % (ref 1.1–10.3)
POTASSIUM SERPL-SCNC: 3.3 MMOL/L (ref 3.7–5.3)
PROT SERPL-MCNC: 5.5 G/DL (ref 6.6–8.7)
RBC # BLD AUTO: 3.95 M/UL (ref 3.95–5.11)
SODIUM SERPL-SCNC: 133 MMOL/L (ref 136–145)
WBC OTHER # BLD: 18.5 K/UL (ref 3.5–11.3)

## 2025-06-21 PROCEDURE — 6370000000 HC RX 637 (ALT 250 FOR IP)

## 2025-06-21 PROCEDURE — 99232 SBSQ HOSP IP/OBS MODERATE 35: CPT | Performed by: HOSPITALIST

## 2025-06-21 PROCEDURE — 83874 ASSAY OF MYOGLOBIN: CPT

## 2025-06-21 PROCEDURE — 85027 COMPLETE CBC AUTOMATED: CPT

## 2025-06-21 PROCEDURE — 6360000002 HC RX W HCPCS: Performed by: INTERNAL MEDICINE

## 2025-06-21 PROCEDURE — 82550 ASSAY OF CK (CPK): CPT

## 2025-06-21 PROCEDURE — 83735 ASSAY OF MAGNESIUM: CPT

## 2025-06-21 PROCEDURE — 6360000002 HC RX W HCPCS

## 2025-06-21 PROCEDURE — 2500000003 HC RX 250 WO HCPCS: Performed by: INTERNAL MEDICINE

## 2025-06-21 PROCEDURE — 80053 COMPREHEN METABOLIC PANEL: CPT

## 2025-06-21 PROCEDURE — 99232 SBSQ HOSP IP/OBS MODERATE 35: CPT | Performed by: SURGERY

## 2025-06-21 PROCEDURE — 36415 COLL VENOUS BLD VENIPUNCTURE: CPT

## 2025-06-21 PROCEDURE — 85055 RETICULATED PLATELET ASSAY: CPT

## 2025-06-21 PROCEDURE — 2060000000 HC ICU INTERMEDIATE R&B

## 2025-06-21 PROCEDURE — 2580000003 HC RX 258

## 2025-06-21 PROCEDURE — 6370000000 HC RX 637 (ALT 250 FOR IP): Performed by: HOSPITALIST

## 2025-06-21 PROCEDURE — 99232 SBSQ HOSP IP/OBS MODERATE 35: CPT | Performed by: INTERNAL MEDICINE

## 2025-06-21 PROCEDURE — 93971 EXTREMITY STUDY: CPT

## 2025-06-21 RX ADMIN — WHITE PETROLATUM: 1.75 OINTMENT TOPICAL at 12:30

## 2025-06-21 RX ADMIN — POTASSIUM BICARBONATE 40 MEQ: 782 TABLET, EFFERVESCENT ORAL at 09:27

## 2025-06-21 RX ADMIN — WATER 2000 MG: 1 INJECTION INTRAMUSCULAR; INTRAVENOUS; SUBCUTANEOUS at 12:27

## 2025-06-21 RX ADMIN — SERTRALINE HYDROCHLORIDE 100 MG: 50 TABLET ORAL at 09:27

## 2025-06-21 RX ADMIN — FENTANYL CITRATE 25 MCG: 50 INJECTION INTRAMUSCULAR; INTRAVENOUS at 23:22

## 2025-06-21 RX ADMIN — FENTANYL CITRATE 25 MCG: 50 INJECTION INTRAMUSCULAR; INTRAVENOUS at 17:52

## 2025-06-21 RX ADMIN — FENTANYL CITRATE 25 MCG: 50 INJECTION INTRAMUSCULAR; INTRAVENOUS at 04:48

## 2025-06-21 RX ADMIN — HEPARIN SODIUM 5000 UNITS: 5000 INJECTION INTRAVENOUS; SUBCUTANEOUS at 06:51

## 2025-06-21 RX ADMIN — TRAZODONE HYDROCHLORIDE 50 MG: 50 TABLET ORAL at 23:12

## 2025-06-21 RX ADMIN — SODIUM CHLORIDE, POTASSIUM CHLORIDE, SODIUM LACTATE AND CALCIUM CHLORIDE: 600; 310; 30; 20 INJECTION, SOLUTION INTRAVENOUS at 19:07

## 2025-06-21 RX ADMIN — FENTANYL CITRATE 25 MCG: 50 INJECTION INTRAMUSCULAR; INTRAVENOUS at 12:26

## 2025-06-21 RX ADMIN — HEPARIN SODIUM 5000 UNITS: 5000 INJECTION INTRAVENOUS; SUBCUTANEOUS at 15:55

## 2025-06-21 RX ADMIN — WATER 2000 MG: 1 INJECTION INTRAMUSCULAR; INTRAVENOUS; SUBCUTANEOUS at 04:49

## 2025-06-21 RX ADMIN — HEPARIN SODIUM 5000 UNITS: 5000 INJECTION INTRAVENOUS; SUBCUTANEOUS at 23:12

## 2025-06-21 RX ADMIN — WHITE PETROLATUM: 1.75 OINTMENT TOPICAL at 23:12

## 2025-06-21 RX ADMIN — POTASSIUM BICARBONATE 40 MEQ: 782 TABLET, EFFERVESCENT ORAL at 06:50

## 2025-06-21 RX ADMIN — SODIUM CHLORIDE, POTASSIUM CHLORIDE, SODIUM LACTATE AND CALCIUM CHLORIDE: 600; 310; 30; 20 INJECTION, SOLUTION INTRAVENOUS at 12:32

## 2025-06-21 RX ADMIN — POTASSIUM BICARBONATE 40 MEQ: 782 TABLET, EFFERVESCENT ORAL at 15:55

## 2025-06-21 RX ADMIN — WATER 2000 MG: 1 INJECTION INTRAMUSCULAR; INTRAVENOUS; SUBCUTANEOUS at 23:11

## 2025-06-21 ASSESSMENT — PAIN DESCRIPTION - DESCRIPTORS
DESCRIPTORS: STABBING;THROBBING
DESCRIPTORS: SHARP;STABBING;THROBBING
DESCRIPTORS: ACHING
DESCRIPTORS: ACHING

## 2025-06-21 ASSESSMENT — PAIN SCALES - GENERAL
PAINLEVEL_OUTOF10: 8
PAINLEVEL_OUTOF10: 8
PAINLEVEL_OUTOF10: 10
PAINLEVEL_OUTOF10: 0
PAINLEVEL_OUTOF10: 0
PAINLEVEL_OUTOF10: 6
PAINLEVEL_OUTOF10: 7

## 2025-06-21 ASSESSMENT — PAIN DESCRIPTION - LOCATION
LOCATION: LEG;ABDOMEN
LOCATION: LEG
LOCATION: LEG
LOCATION: BACK;LEG

## 2025-06-21 ASSESSMENT — PAIN SCALES - WONG BAKER
WONGBAKER_NUMERICALRESPONSE: NO HURT
WONGBAKER_NUMERICALRESPONSE: NO HURT

## 2025-06-21 ASSESSMENT — PAIN DESCRIPTION - ORIENTATION
ORIENTATION: LEFT;POSTERIOR
ORIENTATION: LEFT
ORIENTATION: LEFT
ORIENTATION: POSTERIOR

## 2025-06-21 ASSESSMENT — PAIN - FUNCTIONAL ASSESSMENT: PAIN_FUNCTIONAL_ASSESSMENT: ACTIVITIES ARE NOT PREVENTED

## 2025-06-21 NOTE — PLAN OF CARE
Problem: Discharge Planning  Goal: Discharge to home or other facility with appropriate resources  6/21/2025 1828 by Chicho Hernandez RN  Outcome: Progressing  6/21/2025 0521 by Jp Cameron RN  Outcome: Progressing  Flowsheets (Taken 6/20/2025 2000)  Discharge to home or other facility with appropriate resources:   Identify barriers to discharge with patient and caregiver   Arrange for needed discharge resources and transportation as appropriate   Identify discharge learning needs (meds, wound care, etc)   Refer to discharge planning if patient needs post-hospital services based on physician order or complex needs related to functional status, cognitive ability or social support system     Problem: Pain  Goal: Verbalizes/displays adequate comfort level or baseline comfort level  6/21/2025 1828 by Chicho Hernandez RN  Outcome: Progressing  6/21/2025 0521 by Jp Cameron RN  Outcome: Progressing     Problem: Skin/Tissue Integrity  Goal: Skin integrity remains intact  Description: 1.  Monitor for areas of redness and/or skin breakdown  2.  Assess vascular access sites hourly  3.  Every 4-6 hours minimum:  Change oxygen saturation probe site  4.  Every 4-6 hours:  If on nasal continuous positive airway pressure, respiratory therapy assess nares and determine need for appliance change or resting period  6/21/2025 1828 by Chicho Hernandez RN  Outcome: Progressing  6/21/2025 0521 by Jp Cameron RN  Outcome: Progressing  Flowsheets  Taken 6/21/2025 0520  Skin Integrity Remains Intact:   Monitor for areas of redness and/or skin breakdown   Assess vascular access sites hourly   Every 4-6 hours minimum:  Change oxygen saturation probe site  Taken 6/20/2025 2000  Skin Integrity Remains Intact:   Monitor for areas of redness and/or skin breakdown   Assess vascular access sites hourly   Every 4-6 hours minimum:  Change oxygen saturation probe site     Problem: Safety - Adult  Goal: Free from fall injury  6/21/2025 1828 by David

## 2025-06-21 NOTE — PROGRESS NOTES
[facility]  Internal Medicine Teaching Residency Program  Inpatient Daily Progress Note  ______________________________________________________________________________    Patient: Vannesa Dasilva  YOB: 1985   MRN:[id number]    Acct: [id number]     Room: 0539/0539-01  Admit date: 6/16/2025  Today's date: 06/21/25  Number of days in the hospital: 5    SUBJECTIVE   Admitting Diagnosis: Sepsis (HCC)  CC: Left leg pain and swelling    Pt examined at bedside. Chart & results reviewed.   No acute events overnight    Patient is hemodynamically stable with blood pressure 120/68, afebrile currently on room air, saturation of 95%   urine output 1.15 L/24 hours    Morning labs reviewed  WBC 18.5, hemoglobin 7.6, platelets 291  Sodium 133, potassium 3.3, creatinine 0.4, BUN 8  CK 58638, myoglobin 3519    Blood culture 6/16/2025-2 /2 positive for Streptococcus pyogenes    MRI left lower extremity-extensive cellulitis.  Probable superficial fasciitis.  Concern for osteomyelitis of proximal tibia.  MRI left ankle-extensive cellulitis.  Negative for osteomyelitis in ankle  - CT left tibia-fibula 6/20/2025-concerning for abscess    Plan  - Continue ceftriaxone for as per ID  - Continue IV fluid @150 mL/h for rhabdomyolysis (CK downtrending)  -Dark reddish/brownish urine, will monitor H&H.  Currently hemoglobin stable  - CT left fibula, tibia concerning for abscess, general surgery on board will appreciate recommendation  - GI on board, will appreciate input.    BRIEF HISTORY     The patient is a pleasant 39 y.o. female with a PMHx significant for      Bilateral lower extremity lymphadenopathy  Anxiety/depression  Chronic iron deficiency anemia  Obesity hypoventilation syndrome     presents with a chief complaint of left leg pain and swelling.  According to the patient she was incarcerated yesterday and she states that she was in assisted and she fell down and was left in same

## 2025-06-21 NOTE — PROGRESS NOTES
PROGRESS NOTE  (Janet 2.0)        PATIENT NAME: Vannesa Dasilva  MEDICAL RECORD NO. [id number]  DATE: 2025  SURGEON: Rajiv  PRIMARY CARE PHYSICIAN: Kasandra Gaona MD    HD: # 5    ASSESSMENT    Patient Active Problem List   Diagnosis    Closed comminuted fracture of waist of scaphoid bone of right wrist    Right wrist pain    Anemia    Opioid use, unspecified with other opioid-induced disorder (HCC)    Morbid obesity with BMI of 60.0-69.9, adult (HCC)    History of heroin use    Symptomatic anemia    Shortness of breath    Left leg swelling    Non-traumatic rhabdomyolysis    Iron deficiency anemia secondary to inadequate dietary iron intake    Iron malabsorption    Facial swelling    Swelling of right side of face    Recurrent major depressive disorder, in full remission    Sepsis (HCC)    Electrolyte imbalance    Elevated lactic acid level    Anxiety    Depression    Chronic acquired lymphedema    HOMA (acute kidney injury)    Hypokalemia    Sepsis due to Streptococcus pyogenes (HCC)    Traumatic rhabdomyolysis    Nausea and vomiting       MEDICAL DECISION MAKING AND PLAN    Left leg cellulitis  Obtain venous duplex- new posterior knee cramping pain  Continue iv antbx- consider mrsa coverage   No surgical intervention at this time      SUBJECTIVE    Vannesa Dasilva is unchanged. Vital within normal. New posterior knee pain      OBJECTIVE  VITALS: Temp: Temp: 97.8 °F (36.6 °C)Temp  Av.1 °F (36.7 °C)  Min: 97.8 °F (36.6 °C)  Max: 98.5 °F (36.9 °C) BP Systolic (24hrs), Av , Min:120 , Max:138   Diastolic (24hrs), Av, Min:65, Max:79   Pulse Pulse  Av.7  Min: 81  Max: 88 Resp Resp  Av.9  Min: 12  Max: 36 Pulse ox SpO2  Av.8 %  Min: 98 %  Max: 100 %  GENERAL: alert, no distress  NEURO: no focal deficits  HEENT: perrla eomi  : deferred  LUNGS:NWOB , no resp distress  HEART: normal rate and regular rhythm  ABDOMEN: soft, non-tender and non-distended  EXTERMITY: left leg ace wrapped

## 2025-06-21 NOTE — PLAN OF CARE
Problem: Discharge Planning  Goal: Discharge to home or other facility with appropriate resources  6/21/2025 1913 by Bella Drew RN  Outcome: Progressing  6/21/2025 1828 by Chicho Hernandez RN  Outcome: Progressing  6/21/2025 0521 by Jp Cameron RN  Outcome: Progressing  Flowsheets (Taken 6/20/2025 2000)  Discharge to home or other facility with appropriate resources:   Identify barriers to discharge with patient and caregiver   Arrange for needed discharge resources and transportation as appropriate   Identify discharge learning needs (meds, wound care, etc)   Refer to discharge planning if patient needs post-hospital services based on physician order or complex needs related to functional status, cognitive ability or social support system     Problem: Pain  Goal: Verbalizes/displays adequate comfort level or baseline comfort level  6/21/2025 1913 by Bella Drew RN  Outcome: Progressing  6/21/2025 1828 by Chicho Hernandez RN  Outcome: Progressing  6/21/2025 0521 by Jp Cameron RN  Outcome: Progressing     Problem: Skin/Tissue Integrity  Goal: Skin integrity remains intact  Description: 1.  Monitor for areas of redness and/or skin breakdown  2.  Assess vascular access sites hourly  3.  Every 4-6 hours minimum:  Change oxygen saturation probe site  4.  Every 4-6 hours:  If on nasal continuous positive airway pressure, respiratory therapy assess nares and determine need for appliance change or resting period  6/21/2025 1913 by Bella Drew RN  Outcome: Progressing  6/21/2025 1828 by Chicho Hernandez RN  Outcome: Progressing  6/21/2025 0521 by Jp Cameron RN  Outcome: Progressing  Flowsheets  Taken 6/21/2025 0520  Skin Integrity Remains Intact:   Monitor for areas of redness and/or skin breakdown   Assess vascular access sites hourly   Every 4-6 hours minimum:  Change oxygen saturation probe site  Taken 6/20/2025 2000  Skin Integrity Remains Intact:   Monitor for areas of redness and/or

## 2025-06-21 NOTE — PROGRESS NOTES
results for input(s): \"BC\" in the last 72 hours.  Lab Results   Component Value Date/Time    BACTERIA None 2025 10:37 PM    MUCUS 1+ 10/20/2020 01:41 PM    RBC 3.95 2025 04:59 AM    TRICHOMONAS NOT REPORTED 10/20/2020 01:41 PM    WBC 18.5 2025 04:59 AM    YEAST FEW 2022 02:31 PM    TURBIDITY Turbid 2025 10:37 PM     Lab Results   Component Value Date/Time    CREATININE 0.4 2025 04:59 AM    GLUCOSE 93 2025 04:59 AM         Cultures:  Urine:  2025: No growth  Blood:  25: 2/2 strep pyogenes  25: No growth  25: No growth     Sputum :    Wound:    MRSA Nares:  2025: negative     Imagin-19-25;                25:              25            Review of Systems:     Pertinent review of symptoms listed in Initial Evaluation and daily interval evaluations sections      Physical Examination :   Patient Vitals for the past 8 hrs:   BP Pulse Resp SpO2   25 1300 -- 88 22 100 %   25 1256 -- -- 20 --   25 1215 138/79 86 (!) 36 99 %     General Appearance: Ill-appearing and tachypnea, obese, diaphoretic  Head:  Normocephalic, no trauma  Eyes: Pupils equal, round, reactive to light and accommodation; extraocular movements intact; sclera anicteric; conjunctivae pink. No embolic phenomena.  ENT: Oropharynx clear, without erythema, exudate, or thrush. No tenderness of sinuses. Mouth/throat: mucosa pink and moist. No lesions. Dentition in good repair.  Neck:Supple, without lymphadenopathy. Thyroid normal, No bruits.  Pulmonary/Chest: Clear to auscultation, without wheezes, rales, or rhonchi.  No dullness to percussion.   Cardiovascular: Regular rate and rhythm without murmurs, rubs, or gallops.   Abdomen: Soft, non tender. Bowel sounds normal. No organomegaly  All four Extremities: Bilateral lower extremity edema, worse on left side, small wound noted to left ankle.  Skin is dry  Neurologic: No gross sensory or motor deficits.  Skin: Warm  Updated: 06/18/25 0900     Specimen Description .NASAL SWAB     MRSA, DNA, Nasal NEGATIVE     Comment: NEGATIVE:  MRSA DNA not detected by nucleic acid amplification.                                                    Results should be used as an adjunct to nosocomial control efforts to identify patients   needing enhanced precautions.    The test is not intended to identify patients with staphylococcal infections.  Results   should not be used to guide or monitor treatment for MRSA infections.         Infectious Disease Intervention [[id number]] Collected: 06/17/25 1130    Order Status: Completed Updated: 06/18/25 0706     Intervention De-escalation    Respiratory Panel, Molecular, with COVID-19 (Restricted: peds pts or suitable admitted adults) [[id number]] Collected: 06/17/25 0400    Order Status: Completed Specimen: Nasopharyngeal Swab Updated: 06/17/25 0624     Specimen Description .NASOPHARYNGEAL SWAB     Adenovirus PCR Not Detected     Coronavirus 229E PCR Not Detected     Coronavirus HKU1 PCR Not Detected     Coronavirus NL63 PCR Not Detected     Coronavirus OC43 PCR Not Detected     SARS-CoV-2, PCR Not Detected     Human Metapneumovirus PCR Not Detected     Rhino/Enterovirus PCR Not Detected     Influenza A by PCR Not Detected     Influenza B by PCR Not Detected     Parainfluenza 1 PCR Not Detected     Parainfluenza 2 PCR Not Detected     Parainfluenza 3 PCR Not Detected     Parainfluenza 4 PCR Not Detected     Resp Syncytial Virus PCR Not Detected     Bordetella parapertussis by PCR Not Detected     B Pertussis by PCR Not Detected     Chlamydia pneumoniae By PCR Not Detected     Mycoplasma pneumo by PCR Not Detected     Comment: Performed by multiplexed nucleic acid assay.       Culture, Urine [[id number]] Collected: 06/16/25 2311    Order Status: Completed Specimen: Urine, clean catch Updated: 06/17/25 2156     Specimen Description .CLEAN CATCH URINE     Culture NO GROWTH    Blood Culture 1 [[id number]]

## 2025-06-21 NOTE — PLAN OF CARE
Problem: Discharge Planning  Goal: Discharge to home or other facility with appropriate resources  Outcome: Progressing  Flowsheets (Taken 6/20/2025 2000)  Discharge to home or other facility with appropriate resources:   Identify barriers to discharge with patient and caregiver   Arrange for needed discharge resources and transportation as appropriate   Identify discharge learning needs (meds, wound care, etc)   Refer to discharge planning if patient needs post-hospital services based on physician order or complex needs related to functional status, cognitive ability or social support system     Problem: Pain  Goal: Verbalizes/displays adequate comfort level or baseline comfort level  Outcome: Progressing     Problem: Skin/Tissue Integrity  Goal: Skin integrity remains intact  Description: 1.  Monitor for areas of redness and/or skin breakdown  2.  Assess vascular access sites hourly  3.  Every 4-6 hours minimum:  Change oxygen saturation probe site  4.  Every 4-6 hours:  If on nasal continuous positive airway pressure, respiratory therapy assess nares and determine need for appliance change or resting period  Outcome: Progressing  Flowsheets  Taken 6/21/2025 0520  Skin Integrity Remains Intact:   Monitor for areas of redness and/or skin breakdown   Assess vascular access sites hourly   Every 4-6 hours minimum:  Change oxygen saturation probe site  Taken 6/20/2025 2000  Skin Integrity Remains Intact:   Monitor for areas of redness and/or skin breakdown   Assess vascular access sites hourly   Every 4-6 hours minimum:  Change oxygen saturation probe site     Problem: Safety - Adult  Goal: Free from fall injury  Outcome: Progressing  Flowsheets (Taken 6/21/2025 0520)  Free From Fall Injury:   Instruct family/caregiver on patient safety   Based on caregiver fall risk screen, instruct family/caregiver to ask for assistance with transferring infant if caregiver noted to have fall risk factors     Problem: Chronic

## 2025-06-22 PROBLEM — M79.662 PAIN OF LEFT CALF: Status: ACTIVE | Noted: 2025-06-22

## 2025-06-22 LAB
ALBUMIN SERPL-MCNC: 1.9 G/DL (ref 3.5–5.2)
ALBUMIN/GLOB SERPL: 0.5 {RATIO} (ref 1–2.5)
ALP SERPL-CCNC: 111 U/L (ref 35–104)
ALT SERPL-CCNC: 160 U/L (ref 10–35)
ANION GAP SERPL CALCULATED.3IONS-SCNC: 11 MMOL/L (ref 9–16)
AST SERPL-CCNC: 232 U/L (ref 10–35)
BILIRUB SERPL-MCNC: 0.5 MG/DL (ref 0–1.2)
BUN SERPL-MCNC: 8 MG/DL (ref 6–20)
CALCIUM SERPL-MCNC: 7.7 MG/DL (ref 8.6–10.4)
CHLORIDE SERPL-SCNC: 100 MMOL/L (ref 98–107)
CK SERPL-CCNC: ABNORMAL U/L (ref 26–192)
CK SERPL-CCNC: ABNORMAL U/L (ref 26–192)
CO2 SERPL-SCNC: 21 MMOL/L (ref 20–31)
CREAT SERPL-MCNC: 0.4 MG/DL (ref 0.6–0.9)
ECHO BSA: 2.41 M2
EKG ATRIAL RATE: 85 BPM
EKG P AXIS: 40 DEGREES
EKG P-R INTERVAL: 156 MS
EKG Q-T INTERVAL: 398 MS
EKG QRS DURATION: 86 MS
EKG QTC CALCULATION (BAZETT): 473 MS
EKG R AXIS: 6 DEGREES
EKG T AXIS: 20 DEGREES
EKG VENTRICULAR RATE: 85 BPM
ERYTHROCYTE [DISTWIDTH] IN BLOOD BY AUTOMATED COUNT: 26.8 % (ref 11.8–14.4)
GFR, ESTIMATED: >90 ML/MIN/1.73M2
GLUCOSE SERPL-MCNC: 85 MG/DL (ref 74–99)
HCT VFR BLD AUTO: 27.9 % (ref 36.3–47.1)
HGB BLD-MCNC: 8 G/DL (ref 11.9–15.1)
MCH RBC QN AUTO: 19.8 PG (ref 25.2–33.5)
MCHC RBC AUTO-ENTMCNC: 28.7 G/DL (ref 28.4–34.8)
MCV RBC AUTO: 68.9 FL (ref 82.6–102.9)
MYOGLOBIN SERPL-MCNC: 3955 NG/ML (ref 25–58)
NRBC BLD-RTO: 0.2 PER 100 WBC
PLATELET # BLD AUTO: ABNORMAL K/UL (ref 138–453)
PLATELET, FLUORESCENCE: 316 K/UL (ref 138–453)
PLATELETS.RETICULATED NFR BLD AUTO: 7.1 % (ref 1.1–10.3)
POTASSIUM SERPL-SCNC: 4.1 MMOL/L (ref 3.7–5.3)
PROT SERPL-MCNC: 5.4 G/DL (ref 6.6–8.7)
RBC # BLD AUTO: 4.05 M/UL (ref 3.95–5.11)
SODIUM SERPL-SCNC: 132 MMOL/L (ref 136–145)
WBC OTHER # BLD: 16 K/UL (ref 3.5–11.3)

## 2025-06-22 PROCEDURE — 93971 EXTREMITY STUDY: CPT | Performed by: SURGERY

## 2025-06-22 PROCEDURE — 85055 RETICULATED PLATELET ASSAY: CPT

## 2025-06-22 PROCEDURE — 85027 COMPLETE CBC AUTOMATED: CPT

## 2025-06-22 PROCEDURE — 80053 COMPREHEN METABOLIC PANEL: CPT

## 2025-06-22 PROCEDURE — 99231 SBSQ HOSP IP/OBS SF/LOW 25: CPT | Performed by: SURGERY

## 2025-06-22 PROCEDURE — 83874 ASSAY OF MYOGLOBIN: CPT

## 2025-06-22 PROCEDURE — 6360000002 HC RX W HCPCS

## 2025-06-22 PROCEDURE — 82550 ASSAY OF CK (CPK): CPT

## 2025-06-22 PROCEDURE — 6360000002 HC RX W HCPCS: Performed by: INTERNAL MEDICINE

## 2025-06-22 PROCEDURE — 2580000003 HC RX 258

## 2025-06-22 PROCEDURE — 6370000000 HC RX 637 (ALT 250 FOR IP): Performed by: HOSPITALIST

## 2025-06-22 PROCEDURE — 2500000003 HC RX 250 WO HCPCS: Performed by: INTERNAL MEDICINE

## 2025-06-22 PROCEDURE — 2060000000 HC ICU INTERMEDIATE R&B

## 2025-06-22 PROCEDURE — 99232 SBSQ HOSP IP/OBS MODERATE 35: CPT | Performed by: HOSPITALIST

## 2025-06-22 PROCEDURE — 36415 COLL VENOUS BLD VENIPUNCTURE: CPT

## 2025-06-22 PROCEDURE — 99232 SBSQ HOSP IP/OBS MODERATE 35: CPT | Performed by: INTERNAL MEDICINE

## 2025-06-22 RX ADMIN — WHITE PETROLATUM: 1.75 OINTMENT TOPICAL at 08:52

## 2025-06-22 RX ADMIN — FENTANYL CITRATE 25 MCG: 50 INJECTION INTRAMUSCULAR; INTRAVENOUS at 13:06

## 2025-06-22 RX ADMIN — FENTANYL CITRATE 25 MCG: 50 INJECTION INTRAMUSCULAR; INTRAVENOUS at 21:58

## 2025-06-22 RX ADMIN — WATER 2000 MG: 1 INJECTION INTRAMUSCULAR; INTRAVENOUS; SUBCUTANEOUS at 13:07

## 2025-06-22 RX ADMIN — SODIUM CHLORIDE, POTASSIUM CHLORIDE, SODIUM LACTATE AND CALCIUM CHLORIDE: 600; 310; 30; 20 INJECTION, SOLUTION INTRAVENOUS at 08:44

## 2025-06-22 RX ADMIN — HEPARIN SODIUM 5000 UNITS: 5000 INJECTION INTRAVENOUS; SUBCUTANEOUS at 21:59

## 2025-06-22 RX ADMIN — SODIUM CHLORIDE, POTASSIUM CHLORIDE, SODIUM LACTATE AND CALCIUM CHLORIDE: 600; 310; 30; 20 INJECTION, SOLUTION INTRAVENOUS at 16:21

## 2025-06-22 RX ADMIN — WHITE PETROLATUM: 1.75 OINTMENT TOPICAL at 22:00

## 2025-06-22 RX ADMIN — SODIUM CHLORIDE, POTASSIUM CHLORIDE, SODIUM LACTATE AND CALCIUM CHLORIDE: 600; 310; 30; 20 INJECTION, SOLUTION INTRAVENOUS at 23:06

## 2025-06-22 RX ADMIN — FENTANYL CITRATE 25 MCG: 50 INJECTION INTRAMUSCULAR; INTRAVENOUS at 08:37

## 2025-06-22 RX ADMIN — SERTRALINE HYDROCHLORIDE 100 MG: 50 TABLET ORAL at 08:37

## 2025-06-22 RX ADMIN — HEPARIN SODIUM 5000 UNITS: 5000 INJECTION INTRAVENOUS; SUBCUTANEOUS at 17:41

## 2025-06-22 RX ADMIN — TRAZODONE HYDROCHLORIDE 50 MG: 50 TABLET ORAL at 21:59

## 2025-06-22 ASSESSMENT — PAIN DESCRIPTION - DESCRIPTORS
DESCRIPTORS: STABBING;THROBBING;SHOOTING
DESCRIPTORS: ACHING
DESCRIPTORS: STABBING;SHOOTING;SHARP

## 2025-06-22 ASSESSMENT — PAIN DESCRIPTION - ORIENTATION
ORIENTATION: RIGHT;POSTERIOR;MID
ORIENTATION: POSTERIOR
ORIENTATION: LEFT

## 2025-06-22 ASSESSMENT — PAIN DESCRIPTION - LOCATION
LOCATION: BACK;LEG

## 2025-06-22 ASSESSMENT — PAIN SCALES - GENERAL
PAINLEVEL_OUTOF10: 8
PAINLEVEL_OUTOF10: 0
PAINLEVEL_OUTOF10: 8
PAINLEVEL_OUTOF10: 8

## 2025-06-22 ASSESSMENT — PAIN SCALES - WONG BAKER: WONGBAKER_NUMERICALRESPONSE: NO HURT

## 2025-06-22 NOTE — PLAN OF CARE
Problem: Discharge Planning  Goal: Discharge to home or other facility with appropriate resources  Outcome: Progressing     Problem: Pain  Goal: Verbalizes/displays adequate comfort level or baseline comfort level  Outcome: Progressing     Problem: Skin/Tissue Integrity  Goal: Skin integrity remains intact  Description: 1.  Monitor for areas of redness and/or skin breakdown  2.  Assess vascular access sites hourly  3.  Every 4-6 hours minimum:  Change oxygen saturation probe site  4.  Every 4-6 hours:  If on nasal continuous positive airway pressure, respiratory therapy assess nares and determine need for appliance change or resting period  Outcome: Progressing     Problem: Safety - Adult  Goal: Free from fall injury  Outcome: Progressing     Problem: Chronic Conditions and Co-morbidities  Goal: Patient's chronic conditions and co-morbidity symptoms are monitored and maintained or improved  Outcome: Progressing     Problem: Cardiovascular - Adult  Goal: Maintains optimal cardiac output and hemodynamic stability  Outcome: Progressing     Problem: Gastrointestinal - Adult  Goal: Minimal or absence of nausea and vomiting  Outcome: Progressing  Goal: Maintains or returns to baseline bowel function  Outcome: Progressing     Problem: ABCDS Injury Assessment  Goal: Absence of physical injury  Outcome: Progressing

## 2025-06-22 NOTE — PLAN OF CARE
Problem: Discharge Planning  Goal: Discharge to home or other facility with appropriate resources  6/22/2025 1928 by Bella Drew RN  Outcome: Progressing  6/22/2025 1821 by Chicho Hernandez RN  Outcome: Progressing     Problem: Pain  Goal: Verbalizes/displays adequate comfort level or baseline comfort level  6/22/2025 1928 by Bella Drew RN  Outcome: Progressing  6/22/2025 1821 by Chicho Hernandez RN  Outcome: Progressing     Problem: Skin/Tissue Integrity  Goal: Skin integrity remains intact  Description: 1.  Monitor for areas of redness and/or skin breakdown  2.  Assess vascular access sites hourly  3.  Every 4-6 hours minimum:  Change oxygen saturation probe site  4.  Every 4-6 hours:  If on nasal continuous positive airway pressure, respiratory therapy assess nares and determine need for appliance change or resting period  6/22/2025 1928 by Bella Drew RN  Outcome: Progressing  6/22/2025 1821 by Chicho Hernandez RN  Outcome: Progressing     Problem: Safety - Adult  Goal: Free from fall injury  6/22/2025 1928 by Bella Drew RN  Outcome: Progressing  6/22/2025 1821 by Chicho Hernandez RN  Outcome: Progressing     Problem: Chronic Conditions and Co-morbidities  Goal: Patient's chronic conditions and co-morbidity symptoms are monitored and maintained or improved  6/22/2025 1928 by Bella Drew RN  Outcome: Progressing  6/22/2025 1821 by Chicho Hernandez RN  Outcome: Progressing     Problem: Cardiovascular - Adult  Goal: Maintains optimal cardiac output and hemodynamic stability  6/22/2025 1928 by Bella Drew RN  Outcome: Progressing  6/22/2025 1821 by Chicho Hernandez RN  Outcome: Progressing     Problem: Gastrointestinal - Adult  Goal: Minimal or absence of nausea and vomiting  6/22/2025 1928 by Bella Drew RN  Outcome: Progressing  6/22/2025 1821 by Chicho Hernandez RN  Outcome: Progressing  Goal: Maintains or returns to baseline bowel function  6/22/2025 1928 by

## 2025-06-22 NOTE — PROGRESS NOTES
\"RPR\" in the last 72 hours.  No results for input(s): \"HIV\" in the last 72 hours.  No results for input(s): \"BC\" in the last 72 hours.  Lab Results   Component Value Date/Time    BACTERIA None 2025 10:37 PM    MUCUS 1+ 10/20/2020 01:41 PM    RBC 4.05 2025 05:36 AM    TRICHOMONAS NOT REPORTED 10/20/2020 01:41 PM    WBC 16.0 2025 05:36 AM    YEAST FEW 2022 02:31 PM    TURBIDITY Turbid 2025 10:37 PM     Lab Results   Component Value Date/Time    CREATININE 0.4 2025 05:36 AM    GLUCOSE 85 2025 05:36 AM         Cultures:  Urine:  2025: No growth  Blood:  25: 2/2 strep pyogenes  25: No growth  25: No growth     Sputum :    Wound:    MRSA Nares:  2025: negative     Imagin-19-25;                25:              25            Review of Systems:     Pertinent review of symptoms listed in Initial Evaluation and daily interval evaluations sections      Physical Examination :   Patient Vitals for the past 8 hrs:   BP   25 0900 132/81     General Appearance: Ill-appearing and tachypnea, obese, diaphoretic  Head:  Normocephalic, no trauma  Eyes: Pupils equal, round, reactive to light and accommodation; extraocular movements intact; sclera anicteric; conjunctivae pink. No embolic phenomena.  ENT: Oropharynx clear, without erythema, exudate, or thrush. No tenderness of sinuses. Mouth/throat: mucosa pink and moist. No lesions. Dentition in good repair.  Neck:Supple, without lymphadenopathy. Thyroid normal, No bruits.  Pulmonary/Chest: Clear to auscultation, without wheezes, rales, or rhonchi.  No dullness to percussion.   Cardiovascular: Regular rate and rhythm without murmurs, rubs, or gallops.   Abdomen: Soft, non tender. Bowel sounds normal. No organomegaly  All four Extremities: Bilateral lower extremity edema, worse on left side, small wound noted to left ankle.  Skin is dry  Neurologic: No gross sensory or motor deficits.  Skin: Warm and  unremarkable. No fracture.  No destructive bony abnormality     No acute bony or joint abnormality       Medical Decision Lrhklv-[id number]-Dsovu:     Results       Procedure Component Value Units Date/Time    Culture, Blood 2 [[id number]] Collected: 06/19/25 0619    Order Status: Completed Specimen: Blood Updated: 06/22/25 0702     Specimen Description .BLOOD     Special Requests LEFT HAND 1 ML     Culture NO GROWTH 3 DAYS    Culture, Blood 1 [[id number]] Collected: 06/19/25 0615    Order Status: Completed Specimen: Blood Updated: 06/22/25 0701     Specimen Description .BLOOD     Special Requests R HAND 4 ML     Culture NO GROWTH 3 DAYS    Culture, Blood 1 [[id number]] Collected: 06/19/25 0308    Order Status: Completed Specimen: Blood Updated: 06/22/25 0319     Specimen Description .BLOOD     Special Requests RIGHT HAND 1ML     Culture NO GROWTH 3 DAYS    Culture, Blood 1 [[id number]] Collected: 06/19/25 0306    Order Status: Completed Specimen: Blood Updated: 06/22/25 0320     Specimen Description .BLOOD     Special Requests LEFT HAND 1ML     Culture NO GROWTH 3 DAYS    Culture, Blood 2 [[id number]] Collected: 06/18/25 1024    Order Status: Completed Specimen: Blood Updated: 06/22/25 1047     Specimen Description .BLOOD     Special Requests L HAND 4ML     Culture NO GROWTH 4 DAYS    Culture, Blood 1 [[id number]] Collected: 06/18/25 0653    Order Status: Completed Specimen: Blood Updated: 06/22/25 0846     Specimen Description .BLOOD     Special Requests LEFT HAND     Culture NO GROWTH 4 DAYS    Culture, Blood 1 [[id number]]     Order Status: Canceled Specimen: Blood     Culture, Blood 1 [[id number]]     Order Status: Canceled Specimen: Blood     Culture, Blood 2 [[id number]]     Order Status: Canceled Specimen: Blood     MRSA DNA Probe, Nasal [[id number]] Collected: 06/17/25 1239    Order Status: Completed Specimen: Nasal Updated: 06/18/25 0900     Specimen Description .NASAL SWAB     MRSA, DNA, Nasal NEGATIVE

## 2025-06-22 NOTE — PROGRESS NOTES
PROGRESS NOTE  (Janet 2.0)        PATIENT NAME: Vannesa Dasilva  MEDICAL RECORD NO. [id number]  DATE: 2025  SURGEON: Rajiv  PRIMARY CARE PHYSICIAN: Kasandra Gaona MD    HD: # 6    ASSESSMENT    Patient Active Problem List   Diagnosis    Closed comminuted fracture of waist of scaphoid bone of right wrist    Right wrist pain    Anemia    Opioid use, unspecified with other opioid-induced disorder (HCC)    Morbid obesity with BMI of 60.0-69.9, adult (HCC)    History of heroin use    Symptomatic anemia    Shortness of breath    Left leg swelling    Non-traumatic rhabdomyolysis    Iron deficiency anemia secondary to inadequate dietary iron intake    Iron malabsorption    Facial swelling    Swelling of right side of face    Recurrent major depressive disorder, in full remission    Sepsis (HCC)    Electrolyte imbalance    Elevated lactic acid level    Anxiety    Depression    Chronic acquired lymphedema    HOMA (acute kidney injury)    Hypokalemia    Sepsis due to Streptococcus pyogenes (HCC)    Traumatic rhabdomyolysis    Nausea and vomiting       MEDICAL DECISION MAKING AND PLAN    Left leg cellulitis  Repeat venous duplex negative  Trend leukocytosis  Continue iv antbx- consider mrsa coverage   No surgical intervention at this time- will follow      SUBJECTIVE    Vannesa Dasilva is unchanged. Vital within normal. Unchanged since yesterday      OBJECTIVE  VITALS: Temp: Temp: 97.3 °F (36.3 °C)Temp  Av.7 °F (36.5 °C)  Min: 97.3 °F (36.3 °C)  Max: 98.2 °F (36.8 °C) BP Systolic (24hrs), Av , Min:121 , Max:140   Diastolic (24hrs), Av, Min:65, Max:80   Pulse Pulse  Av  Min: 81  Max: 88 Resp Resp  Av.9  Min: 18  Max: 36 Pulse ox SpO2  Av.8 %  Min: 97 %  Max: 100 %  GENERAL: alert, no distress  NEURO: no focal deficits  HEENT: perrla eomi  : deferred  LUNGS:NWOB , no resp distress  HEART: normal rate and regular rhythm  ABDOMEN: soft, non-tender and non-distended  EXTERMITY: left leg ace

## 2025-06-22 NOTE — PROGRESS NOTES
[facility]  Internal Medicine Teaching Residency Program  Inpatient Daily Progress Note  ______________________________________________________________________________    Patient: Vannesa Dasilva  YOB: 1985   MRN:[id number]    Acct: [id number]     Room: 0539/0539-01  Admit date: 6/16/2025  Today's date: 06/22/25  Number of days in the hospital: 6    SUBJECTIVE   Admitting Diagnosis: Sepsis (HCC)  CC: Left leg pain and swelling    Pt examined at bedside. Chart & results reviewed.   No acute events overnight    Patient is hemodynamically stable with blood pressure 129/78, afebrile currently on room air, saturation of 95%   urine output 2 L/24 hours    Morning labs reviewed  WBC 16, hemoglobin 8, platelets 316  Sodium 132, potassium 4.1, creatinine 0.4, BUN 8  CK 73692 myoglobin 3955 (trending down)    Blood culture 6/16/2025-2 /2 positive for Streptococcus pyogenes    MRI left lower extremity-extensive cellulitis.  Probable superficial fasciitis.  Concern for osteomyelitis of proximal tibia.  MRI left ankle-extensive cellulitis.  Negative for osteomyelitis in ankle    Plan  - Continue ceftriaxone for as per ID  - Continue IV fluid @150 mL/h for rhabdomyolysis (CK downtrending)  - General Surgery on board for concern of cellulitis/abscess.  No surgical intervention at this point.  General surgery on board with appreciate recommendations    BRIEF HISTORY     The patient is a pleasant 39 y.o. female with a PMHx significant for      Bilateral lower extremity lymphadenopathy  Anxiety/depression  Chronic iron deficiency anemia  Obesity hypoventilation syndrome     presents with a chief complaint of left leg pain and swelling.  According to the patient she was incarcerated yesterday and she states that she was in senior living and she fell down and was left in same position for more than 2 hours.  After that she was having problem while walking.    Patient does have

## 2025-06-23 LAB
ALBUMIN SERPL-MCNC: 2.1 G/DL (ref 3.5–5.2)
ALBUMIN/GLOB SERPL: 0.6 {RATIO} (ref 1–2.5)
ALP SERPL-CCNC: 88 U/L (ref 35–104)
ALT SERPL-CCNC: 137 U/L (ref 10–35)
ANION GAP SERPL CALCULATED.3IONS-SCNC: 12 MMOL/L (ref 9–16)
AST SERPL-CCNC: 140 U/L (ref 10–35)
BILIRUB SERPL-MCNC: 0.6 MG/DL (ref 0–1.2)
BUN SERPL-MCNC: 8 MG/DL (ref 6–20)
CALCIUM SERPL-MCNC: 7.6 MG/DL (ref 8.6–10.4)
CANNABINOIDS: <5 NG/ML
CHLORIDE SERPL-SCNC: 101 MMOL/L (ref 98–107)
CK SERPL-CCNC: 4424 U/L (ref 26–192)
CO2 SERPL-SCNC: 19 MMOL/L (ref 20–31)
CREAT SERPL-MCNC: 0.4 MG/DL (ref 0.6–0.9)
CRP SERPL HS-MCNC: 114 MG/L (ref 0–5)
ERYTHROCYTE [DISTWIDTH] IN BLOOD BY AUTOMATED COUNT: 27.7 % (ref 11.8–14.4)
GFR, ESTIMATED: >90 ML/MIN/1.73M2
GLUCOSE BLD-MCNC: 77 MG/DL (ref 65–105)
GLUCOSE BLD-MCNC: 79 MG/DL (ref 65–105)
GLUCOSE BLD-MCNC: 89 MG/DL (ref 65–105)
GLUCOSE SERPL-MCNC: 81 MG/DL (ref 74–99)
HCT VFR BLD AUTO: 27.6 % (ref 36.3–47.1)
HGB BLD-MCNC: 7.5 G/DL (ref 11.9–15.1)
MCH RBC QN AUTO: 19.8 PG (ref 25.2–33.5)
MCHC RBC AUTO-ENTMCNC: 27.2 G/DL (ref 28.4–34.8)
MCV RBC AUTO: 73 FL (ref 82.6–102.9)
MICROORGANISM SPEC CULT: NORMAL
MICROORGANISM SPEC CULT: NORMAL
MYOGLOBIN SERPL-MCNC: 1413 NG/ML (ref 25–58)
NRBC BLD-RTO: 0.5 PER 100 WBC
PLATELET # BLD AUTO: ABNORMAL K/UL (ref 138–453)
PLATELET, FLUORESCENCE: 292 K/UL (ref 138–453)
PLATELETS.RETICULATED NFR BLD AUTO: 5.6 % (ref 1.1–10.3)
POTASSIUM SERPL-SCNC: 4.1 MMOL/L (ref 3.7–5.3)
PROT SERPL-MCNC: 5.7 G/DL (ref 6.6–8.7)
RBC # BLD AUTO: 3.78 M/UL (ref 3.95–5.11)
SERVICE CMNT-IMP: NORMAL
SERVICE CMNT-IMP: NORMAL
SODIUM SERPL-SCNC: 132 MMOL/L (ref 136–145)
SPECIMEN DESCRIPTION: NORMAL
SPECIMEN DESCRIPTION: NORMAL
WBC OTHER # BLD: 11.2 K/UL (ref 3.5–11.3)

## 2025-06-23 PROCEDURE — 85027 COMPLETE CBC AUTOMATED: CPT

## 2025-06-23 PROCEDURE — 6370000000 HC RX 637 (ALT 250 FOR IP): Performed by: NURSE PRACTITIONER

## 2025-06-23 PROCEDURE — 82947 ASSAY GLUCOSE BLOOD QUANT: CPT

## 2025-06-23 PROCEDURE — 97110 THERAPEUTIC EXERCISES: CPT

## 2025-06-23 PROCEDURE — 6360000002 HC RX W HCPCS: Performed by: INTERNAL MEDICINE

## 2025-06-23 PROCEDURE — 80053 COMPREHEN METABOLIC PANEL: CPT

## 2025-06-23 PROCEDURE — 2500000003 HC RX 250 WO HCPCS: Performed by: INTERNAL MEDICINE

## 2025-06-23 PROCEDURE — 94761 N-INVAS EAR/PLS OXIMETRY MLT: CPT

## 2025-06-23 PROCEDURE — 6370000000 HC RX 637 (ALT 250 FOR IP): Performed by: HOSPITALIST

## 2025-06-23 PROCEDURE — 86140 C-REACTIVE PROTEIN: CPT

## 2025-06-23 PROCEDURE — 83874 ASSAY OF MYOGLOBIN: CPT

## 2025-06-23 PROCEDURE — 97535 SELF CARE MNGMENT TRAINING: CPT

## 2025-06-23 PROCEDURE — 85055 RETICULATED PLATELET ASSAY: CPT

## 2025-06-23 PROCEDURE — 97530 THERAPEUTIC ACTIVITIES: CPT

## 2025-06-23 PROCEDURE — 2060000000 HC ICU INTERMEDIATE R&B

## 2025-06-23 PROCEDURE — 2580000003 HC RX 258

## 2025-06-23 PROCEDURE — APPSS30 APP SPLIT SHARED TIME 16-30 MINUTES: Performed by: NURSE PRACTITIONER

## 2025-06-23 PROCEDURE — 36415 COLL VENOUS BLD VENIPUNCTURE: CPT

## 2025-06-23 PROCEDURE — 99232 SBSQ HOSP IP/OBS MODERATE 35: CPT | Performed by: INTERNAL MEDICINE

## 2025-06-23 PROCEDURE — 82550 ASSAY OF CK (CPK): CPT

## 2025-06-23 PROCEDURE — 99233 SBSQ HOSP IP/OBS HIGH 50: CPT | Performed by: INTERNAL MEDICINE

## 2025-06-23 PROCEDURE — 6360000002 HC RX W HCPCS

## 2025-06-23 PROCEDURE — 2500000003 HC RX 250 WO HCPCS

## 2025-06-23 RX ORDER — OXYCODONE AND ACETAMINOPHEN 5; 325 MG/1; MG/1
1 TABLET ORAL EVERY 6 HOURS PRN
Status: DISCONTINUED | OUTPATIENT
Start: 2025-06-23 | End: 2025-06-28 | Stop reason: HOSPADM

## 2025-06-23 RX ADMIN — WHITE PETROLATUM: 1.75 OINTMENT TOPICAL at 23:38

## 2025-06-23 RX ADMIN — WHITE PETROLATUM: 1.75 OINTMENT TOPICAL at 09:25

## 2025-06-23 RX ADMIN — QUETIAPINE FUMARATE 200 MG: 200 TABLET ORAL at 00:29

## 2025-06-23 RX ADMIN — FENTANYL CITRATE 25 MCG: 50 INJECTION INTRAMUSCULAR; INTRAVENOUS at 23:46

## 2025-06-23 RX ADMIN — WATER 2000 MG: 1 INJECTION INTRAMUSCULAR; INTRAVENOUS; SUBCUTANEOUS at 00:30

## 2025-06-23 RX ADMIN — TRAZODONE HYDROCHLORIDE 50 MG: 50 TABLET ORAL at 23:38

## 2025-06-23 RX ADMIN — SODIUM CHLORIDE, PRESERVATIVE FREE 10 ML: 5 INJECTION INTRAVENOUS at 19:31

## 2025-06-23 RX ADMIN — HEPARIN SODIUM 5000 UNITS: 5000 INJECTION INTRAVENOUS; SUBCUTANEOUS at 23:38

## 2025-06-23 RX ADMIN — SODIUM CHLORIDE, POTASSIUM CHLORIDE, SODIUM LACTATE AND CALCIUM CHLORIDE: 600; 310; 30; 20 INJECTION, SOLUTION INTRAVENOUS at 05:52

## 2025-06-23 RX ADMIN — FENTANYL CITRATE 25 MCG: 50 INJECTION INTRAMUSCULAR; INTRAVENOUS at 14:07

## 2025-06-23 RX ADMIN — HEPARIN SODIUM 5000 UNITS: 5000 INJECTION INTRAVENOUS; SUBCUTANEOUS at 05:51

## 2025-06-23 RX ADMIN — QUETIAPINE FUMARATE 200 MG: 200 TABLET ORAL at 23:36

## 2025-06-23 RX ADMIN — WATER 2000 MG: 1 INJECTION INTRAMUSCULAR; INTRAVENOUS; SUBCUTANEOUS at 23:37

## 2025-06-23 RX ADMIN — WATER 2000 MG: 1 INJECTION INTRAMUSCULAR; INTRAVENOUS; SUBCUTANEOUS at 12:42

## 2025-06-23 RX ADMIN — HEPARIN SODIUM 5000 UNITS: 5000 INJECTION INTRAVENOUS; SUBCUTANEOUS at 14:54

## 2025-06-23 RX ADMIN — SERTRALINE HYDROCHLORIDE 100 MG: 50 TABLET ORAL at 09:26

## 2025-06-23 ASSESSMENT — PAIN DESCRIPTION - LOCATION
LOCATION: LEG
LOCATION: LEG

## 2025-06-23 ASSESSMENT — PAIN DESCRIPTION - ORIENTATION
ORIENTATION: LEFT
ORIENTATION: LEFT

## 2025-06-23 ASSESSMENT — PAIN DESCRIPTION - DESCRIPTORS
DESCRIPTORS: ACHING
DESCRIPTORS: THROBBING

## 2025-06-23 ASSESSMENT — PAIN SCALES - GENERAL
PAINLEVEL_OUTOF10: 9
PAINLEVEL_OUTOF10: 8

## 2025-06-23 NOTE — PROGRESS NOTES
[facility] Wound Ostomy  Nurse  Consult Note       NAME:  Vannesa Dasilva  MEDICAL RECORD NUMBER:  [id number]  AGE: 40 y.o.   GENDER: female  : 1985  TODAY'S DATE:  2025    Subjective   Reason for WOC Nurse Evaluation and Assessment: \"LLE wound, dressings and venous stasis wraps\"      Vannesa Dasilva is a 40 y.o. female referred by:   [x] Physician  [] Nursing  [] Other:     Wound Identification:  Wound Type: lymphedema  cellulitis  Contributing Factors: lymphedema and obesity    Wound History:   Admitted  with left leg swelling  Current Wound Care Treatment:    Aquaphor topically  ACE wrap compression from toes to knee initiated by ID.          PAST MEDICAL HISTORY        Diagnosis Date    History of heroin use 2020       PAST SURGICAL HISTORY    Past Surgical History:   Procedure Laterality Date    TUBAL LIGATION         FAMILY HISTORY    No family history on file.    SOCIAL HISTORY    Social History     Tobacco Use    Smoking status: Every Day     Current packs/day: 0.50     Types: Cigarettes    Smokeless tobacco: Never   Vaping Use    Vaping status: Never Used   Substance Use Topics    Alcohol use: No    Drug use: No     Comment: on Methadone       ALLERGIES    Allergies   Allergen Reactions    Famotidine Angioedema     Resolved with benadryl    Ibuprofen Hives     Facial swelling and hives reported by pt.        MEDICATIONS    No current facility-administered medications on file prior to encounter.     Current Outpatient Medications on File Prior to Encounter   Medication Sig Dispense Refill    sertraline (ZOLOFT) 25 MG tablet take 3 tablets by mouth daily TO WITH 100 MG DOSE TO EQUAL 175 MG DOSE      prazosin (MINIPRESS) 2 MG capsule take 1 capsule by mouth at bedtime      REXULTI 1 MG TABS tablet take 1 tablet by mouth once daily      QUEtiapine (SEROQUEL) 200 MG tablet take 1 tablet by mouth at bedtime      bumetanide (BUMEX) 2 MG tablet Take 1 tablet by mouth daily 30 tablet 3

## 2025-06-23 NOTE — PROGRESS NOTES
PROGRESS NOTE  (Janet 2.0)        PATIENT NAME: Vannesa Dasilva  MEDICAL RECORD NO. [id number]  DATE: 2025  SURGEON: Rajiv  PRIMARY CARE PHYSICIAN: Kasandra Gaona MD    HD: # 7    ASSESSMENT    Patient Active Problem List   Diagnosis    Closed comminuted fracture of waist of scaphoid bone of right wrist    Right wrist pain    Anemia    Opioid use, unspecified with other opioid-induced disorder (HCC)    Morbid obesity with BMI of 60.0-69.9, adult (HCC)    History of heroin use    Symptomatic anemia    Shortness of breath    Left leg swelling    Non-traumatic rhabdomyolysis    Iron deficiency anemia secondary to inadequate dietary iron intake    Iron malabsorption    Facial swelling    Swelling of right side of face    Recurrent major depressive disorder, in full remission    Sepsis (HCC)    Electrolyte imbalance    Elevated lactic acid level    Anxiety    Depression    Chronic acquired lymphedema    HOMA (acute kidney injury)    Hypokalemia    Sepsis due to Streptococcus pyogenes (HCC)    Traumatic rhabdomyolysis    Nausea and vomiting    Pain of left calf       MEDICAL DECISION MAKING AND PLAN    Left leg cellulitis - consult wound ostomy for dressing changes/ venous stasis wraps   Repeat venous duplex negative  Trend leukocytosis  Continue iv antbx- consider mrsa coverage   No surgical intervention at this time      SUBJECTIVE    Vannesa Dasilva is unchanged. Vital within normal.     OBJECTIVE  VITALS: Temp: Temp: 97.8 °F (36.6 °C)Temp  Av.7 °F (36.5 °C)  Min: 97.7 °F (36.5 °C)  Max: 97.8 °F (36.6 °C) BP Systolic (24hrs), Av , Min:123 , Max:147   Diastolic (24hrs), Av, Min:70, Max:81   Pulse Pulse  Av  Min: 82  Max: 104 Resp Resp  Av.8  Min: 17  Max: 35 Pulse ox SpO2  Av.7 %  Min: 95 %  Max: 98 %  GENERAL: alert, no distress  NEURO: no focal deficits  HEENT: perrla eomi  : deferred  LUNGS:NWOB , no resp distress  HEART: normal rate and regular rhythm  ABDOMEN: soft,  non-tender and non-distended  EXTERMITY: left leg ace wrapped and elevated, palpable pulses, warm, tender to touch in upper calf portion of left leg     I/O last 3 completed shifts:  In: 1800 [I.V.:1800]  Out: 3200 [Urine:3200]    Drain/tube output:  In: 1800 [I.V.:1800]  Out: 2200 [Urine:2200]    LAB:  CBC:   Recent Labs     06/21/25 0459 06/22/25  0536   WBC 18.5* 16.0*   HGB 7.6* 8.0*   HCT 28.7* 27.9*   MCV 72.7* 68.9*   PLT See Reflexed IPF Result See Reflexed IPF Result     BMP:   Recent Labs     06/21/25 0459 06/22/25  0536 06/23/25  0719   * 132* 132*   K 3.3* 4.1 4.1   CL 99 100 101   CO2 22 21 19*   BUN 8 8 8   CREATININE 0.4* 0.4* 0.4*   GLUCOSE 93 85 81     COAGS:   No results for input(s): \"APTT\", \"INR\" in the last 72 hours.    Invalid input(s): \"PROT\"      RADIOLOGY:  CT TIBIA FIBULA LEFT W CONTRAST  Result Date: 6/20/2025  Diffuse edema and skin thickening throughout the leg suggestive of cellulitis with a small rim enhancing fluid collection overlying the lateral aspect of the distal leg suggestive of an abscess.     MRI TIBIA FIBULA LEFT W WO CONTRAST  Result Date: 6/18/2025  MRI LOWER LEG 1.  Extensive cellulitis. 2.  Probable superficial fasciitis. 3.  Concern for osteomyelitis of the proximal tibia. MRI ANKLE 1.  Extensive cellulitis 2.  Negative for osteomyelitis in the ankle..     MRI ANKLE LEFT W WO CONTRAST  Result Date: 6/18/2025  MRI LOWER LEG 1.  Extensive cellulitis. 2.  Probable superficial fasciitis. 3.  Concern for osteomyelitis of the proximal tibia. MRI ANKLE 1.  Extensive cellulitis 2.  Negative for osteomyelitis in the ankle..     US ABDOMEN LIMITED  Result Date: 6/18/2025  1. Hepatic steatosis. 2. Cholelithiasis without evidence of acute cholecystitis.     CT CHEST PULMONARY EMBOLISM W CONTRAST  Result Date: 6/17/2025  1. Limited evaluation of the pulmonary arteries due to poor opacification. No evidence for large central embolism.  If there remains high clinical suspicion

## 2025-06-23 NOTE — PROGRESS NOTES
[facility]  Internal Medicine Teaching Residency Program  Inpatient Daily Progress Note  ______________________________________________________________________________    Patient: Vannesa Dasilva  YOB: 1985   MRN:[id number]    Acct: [id number]     Room: 0539/0539-01  Admit date: 6/16/2025  Today's date: 06/23/25  Number of days in the hospital: 7    SUBJECTIVE   Admitting Diagnosis: Sepsis (HCC)  CC: Left leg pain and swelling    Pt examined at bedside. Chart & results reviewed.   No acute events overnight    Patient is hemodynamically stable with blood pressure 120/68, afebrile currently on room air, saturation of 95%   urine output 1.15 L/24 hours    Morning labs reviewed  WBC 11.2, hemoglobin 7.5, platelets 292  Sodium 132, potassium 4.1, creatinine 0.4, BUN 8  CK 4424, myoglobin 1413  Blood culture 6/16/2025-2 /2 positive for Streptococcus pyogenes    MRI left lower extremity-extensive cellulitis.  Probable superficial fasciitis.  Concern for osteomyelitis of proximal tibia.  MRI left ankle-extensive cellulitis.  Negative for osteomyelitis in ankle  - CT left tibia-fibula 6/20/2025-concerning for abscess    Plan  - Continue ceftriaxone for as per ID  - Continue IV fluid @50 mL/h for rhabdomyolysis (CK downtrending)  - CT left fibula, tibia concerning for abscess, general surgery on board will appreciate recommendation    BRIEF HISTORY     The patient is a pleasant 39 y.o. female with a PMHx significant for      Bilateral lower extremity lymphadenopathy  Anxiety/depression  Chronic iron deficiency anemia  Obesity hypoventilation syndrome     presents with a chief complaint of left leg pain and swelling.  According to the patient she was incarcerated yesterday and she states that she was in half[place]way and she fell down and was left in same position for more than 2 hours.  After that she was having problem while walking.    Patient does have bilateral lower  06/22/25  0536 06/23/25  0719   * 232* 140*   * 160* 137*   BILITOT 0.5 0.5 0.6   ALKPHOS 88 111* 88      MICROBIOLOGY:   Lab Results   Component Value Date/Time    CULTURE NO GROWTH 4 DAYS 06/19/2025 06:19 AM       Imaging:    CT CHEST PULMONARY EMBOLISM W CONTRAST  Result Date: 6/17/2025  1. Limited evaluation of the pulmonary arteries due to poor opacification. No evidence for large central embolism.  If there remains high clinical suspicion for acute embolism, consider V/Q scan or repeat CT imaging. 2. No acute airspace disease identified. 3. No acute inflammatory process identified in the abdomen or pelvis. 4. Mild hepatosplenomegaly. Mild hepatic steatosis.     CT ABDOMEN PELVIS W IV CONTRAST Additional Contrast? None  Result Date: 6/17/2025  1. Limited evaluation of the pulmonary arteries due to poor opacification. No evidence for large central embolism.  If there remains high clinical suspicion for acute embolism, consider V/Q scan or repeat CT imaging. 2. No acute airspace disease identified. 3. No acute inflammatory process identified in the abdomen or pelvis. 4. Mild hepatosplenomegaly. Mild hepatic steatosis.     XR CHEST PORTABLE  Result Date: 6/16/2025  No acute cardiopulmonary findings     XR KNEE LEFT (3 VIEWS)  Result Date: 6/16/2025  No acute bony or joint abnormality     XR TIBIA FIBULA LEFT (2 VIEWS)  Result Date: 6/16/2025  No acute bony or joint abnormality       ASSESSMENT & PLAN     Assessment and Plan:    Principal Problem:    Sepsis (HCC)  Active Problems:    Anemia    History of heroin use    Shortness of breath    Left leg swelling    Electrolyte imbalance    Elevated lactic acid level    Anxiety    Depression    Chronic acquired lymphedema    HOMA (acute kidney injury)    Hypokalemia    Sepsis due to Streptococcus pyogenes (HCC)    Traumatic rhabdomyolysis    Nausea and vomiting    Pain of left calf  Resolved Problems:    * No resolved hospital problems. *    #Left Leg

## 2025-06-23 NOTE — PROGRESS NOTES
Occupational Therapy  Occupational Therapy Daily Treatment Note  Facility/Department: [facility] 5C STEPDOWN   Patient Name: Vannesa Dasilva        MRN: [id number]    : 1985    Date of Service: 2025    Chief Complaint   Patient presents with    Leg Swelling   Copied from Internal Medicine:  resents with a chief complaint of left leg pain and swelling.  According to the patient she was incarcerated yesterday and she states that she was in [facility] and she fell down and was left in same position for more than 2 hours.  After that she was having problem while walking.    Patient does have bilateral lower extremity nonpitting edema.  Patient denies losing consciousness or hitting her head when she fell.  Patient is not on any anticoagulation.  She denies chest pain, shortness of breath, fever, chills, rigors, cough, congestion, abdominal pain, nausea, vomiting, diarrhea, constipation.  Past Medical History:  has a past medical history of History of heroin use.  Past Surgical History:  has a past surgical history that includes Tubal ligation.    Discharge Recommendations  Discharge Recommendations: Patient would benefit from continued therapy after discharge  OT Equipment Recommendations  Equipment Needed:  (CTA)    Assessment  Pt lying supine in bed upon entrance to room. Pt completed bed mobility with max x 2 assistance. Pt requires an extended time to complete due to increased LLE pain. Pt sat at eob 25 minutes with fair unsupported sitting balance with use of BUE's for support, pt requesting multiple times to retire to supine in bed due to increased LLE pain, RN aware. Pt provided with emotional support and active listening applied to motivate to continue with mobility. Pts heart rate 127 bpm in stance, quickly goes down to 100 in sitting. Please refer to below assist levels for adl completion. Discussed with pt non pharmaceutical pain reliving tech.  Pt ed on OT POC, safety awareness tech, proper hand placement for  transfers, with fair return. Pt retired supine in bed with call light and phone in reach, bed alarm activated and RN informed. All needs met upon exit.   Performance deficits / Impairments: Decreased functional mobility ;Decreased ADL status;Decreased strength;Decreased safe awareness;Decreased high-level IADLs;Decreased balance;Decreased endurance  Prognosis: Fair  REQUIRES OT FOLLOW-UP: Yes  Activity Tolerance  Activity Tolerance: Patient limited by pain  Safety Devices  Type of Devices: Bed alarm in place;Call light within reach;Nurse notified;Left in bed;Gait belt  Restraints  Restraints Initially in Place: No    AM-PAC  AM-[facility] Daily Activity - Inpatient   How much help is needed for putting on and taking off regular lower body clothing?: A Lot  How much help is needed for bathing (which includes washing, rinsing, drying)?: A Lot  How much help is needed for toileting (which includes using toilet, bedpan, or urinal)?: A Lot  How much help is needed for putting on and taking off regular upper body clothing?: A Little  How much help is needed for taking care of personal grooming?: A Little  How much help for eating meals?: None  AM-[facility] Inpatient Daily Activity Raw Score: 16  AM-PAC Inpatient ADL T-Scale Score : 35.96  ADL Inpatient CMS 0-100% Score: 53.32  ADL Inpatient CMS G-Code Modifier : CK    Restrictions/Precautions  Restrictions/Precautions  Restrictions/Precautions: Fall Risk  Activity Level: Up as Tolerated  Required Braces or Orthoses?: No  Position Activity Restriction  Other Position/Activity Restrictions: Keep MAP > 65. LLE cellulitis; MRI of left lower extremity concerning for tibial osteomyelitis and severe cellulitis.  Ankle MRI no concern for osteomyelitis.  O2 Device: None (Room air)    Subjective  General  Patient assessed for rehabilitation services?: Yes  Family / Caregiver Present: No  Pain  Pre-Pain: 8  Post-Pain: 9  Pain Location: Left;Leg  Pain Interventions: Nurse

## 2025-06-23 NOTE — PROGRESS NOTES
otherwise specified, incidental findings do not require dedicated imaging follow-up.     1. Limited evaluation of the pulmonary arteries due to poor opacification. No evidence for large central embolism.  If there remains high clinical suspicion for acute embolism, consider V/Q scan or repeat CT imaging. 2. No acute airspace disease identified. 3. No acute inflammatory process identified in the abdomen or pelvis. 4. Mild hepatosplenomegaly. Mild hepatic steatosis.     XR CHEST PORTABLE  Result Date: 6/16/2025  EXAMINATION: ONE XRAY VIEW OF THE CHEST 6/16/2025 4:55 pm COMPARISON: None. HISTORY: ORDERING SYSTEM PROVIDED HISTORY: Sepsis TECHNOLOGIST PROVIDED HISTORY: Sepsis FINDINGS: Normal cardiomediastinal silhouette.  No acute airspace infiltrate.  No pneumothorax or pleural effusion     No acute cardiopulmonary findings     XR KNEE LEFT (3 VIEWS)  Result Date: 6/16/2025  EXAMINATION: 3 XRAY VIEWS OF THE LEFT TIBIA AND FIBULA; THREE XRAY VIEWS OF THE LEFT KNEE 6/16/2025 4:55 pm COMPARISON: None. HISTORY: ORDERING SYSTEM PROVIDED HISTORY: left leg pain, swelling TECHNOLOGIST PROVIDED HISTORY: left leg pain, swelling; ORDERING SYSTEM PROVIDED HISTORY: left leg pain TECHNOLOGIST PROVIDED HISTORY: left leg pain FINDINGS: Left knee: Anatomic alignment.  No fracture.  No joint effusion.  Joint spaces appear preserved. Left tibia and fibula: Anatomic alignment.  Joint spaces appear unremarkable. No fracture.  No destructive bony abnormality     No acute bony or joint abnormality     XR TIBIA FIBULA LEFT (2 VIEWS)  Result Date: 6/16/2025  EXAMINATION: 3 XRAY VIEWS OF THE LEFT TIBIA AND FIBULA; THREE XRAY VIEWS OF THE LEFT KNEE 6/16/2025 4:55 pm COMPARISON: None. HISTORY: ORDERING SYSTEM PROVIDED HISTORY: left leg pain, swelling TECHNOLOGIST PROVIDED HISTORY: left leg pain, swelling; ORDERING SYSTEM PROVIDED HISTORY: left leg pain TECHNOLOGIST PROVIDED HISTORY: left leg pain FINDINGS: Left knee: Anatomic alignment.  No  fracture.  No joint effusion.  Joint spaces appear preserved. Left tibia and fibula: Anatomic alignment.  Joint spaces appear unremarkable. No fracture.  No destructive bony abnormality     No acute bony or joint abnormality       Medical Decision Lkkkur-Lcboatjw-Opoiy:     Results       Procedure Component Value Units Date/Time    Culture, Blood 2 [[id number]] Collected: 06/19/25 0619    Order Status: Completed Specimen: Blood Updated: 06/23/25 0702     Specimen Description .BLOOD     Special Requests LEFT HAND 1 ML     Culture NO GROWTH 4 DAYS    Culture, Blood 1 [[id number]] Collected: 06/19/25 0615    Order Status: Completed Specimen: Blood Updated: 06/23/25 0701     Specimen Description .BLOOD     Special Requests R HAND 4 ML     Culture NO GROWTH 4 DAYS    Culture, Blood 1 [[id number]] Collected: 06/19/25 0308    Order Status: Completed Specimen: Blood Updated: 06/23/25 0319     Specimen Description .BLOOD     Special Requests RIGHT HAND 1ML     Culture NO GROWTH 4 DAYS    Culture, Blood 1 [[id number]] Collected: 06/19/25 0306    Order Status: Completed Specimen: Blood Updated: 06/23/25 0320     Specimen Description .BLOOD     Special Requests LEFT HAND 1ML     Culture NO GROWTH 4 DAYS    Culture, Blood 2 [[id number]] Collected: 06/18/25 1024    Order Status: Completed Specimen: Blood Updated: 06/23/25 1049     Specimen Description .BLOOD     Special Requests L HAND 4ML     Culture NO GROWTH 5 DAYS    Culture, Blood 1 [[id number]] Collected: 06/18/25 0653    Order Status: Completed Specimen: Blood Updated: 06/23/25 0849     Specimen Description .BLOOD     Special Requests LEFT HAND     Culture NO GROWTH 5 DAYS    Culture, Blood 1 [[id number]]     Order Status: Canceled Specimen: Blood     Culture, Blood 1 [[id number]]     Order Status: Canceled Specimen: Blood     Culture, Blood 2 [[id number]]     Order Status: Canceled Specimen: Blood     MRSA DNA Probe, Nasal [[id number]] Collected: 06/17/25 1239    Order

## 2025-06-23 NOTE — PLAN OF CARE
Problem: Discharge Planning  Goal: Discharge to home or other facility with appropriate resources  6/23/2025 1920 by Bella Drew RN  Outcome: Progressing  6/23/2025 1216 by Kiana Larios RN  Outcome: Progressing     Problem: Pain  Goal: Verbalizes/displays adequate comfort level or baseline comfort level  6/23/2025 1920 by Bella Drew RN  Outcome: Progressing  6/23/2025 1216 by Kiana Larios RN  Outcome: Progressing     Problem: Skin/Tissue Integrity  Goal: Skin integrity remains intact  Description: 1.  Monitor for areas of redness and/or skin breakdown  2.  Assess vascular access sites hourly  3.  Every 4-6 hours minimum:  Change oxygen saturation probe site  4.  Every 4-6 hours:  If on nasal continuous positive airway pressure, respiratory therapy assess nares and determine need for appliance change or resting period  6/23/2025 1920 by Bella Drew RN  Outcome: Progressing  6/23/2025 1216 by Kiana Larios RN  Outcome: Progressing     Problem: Safety - Adult  Goal: Free from fall injury  6/23/2025 1920 by Bella Drew RN  Outcome: Progressing  6/23/2025 1216 by Kiana Larios RN  Outcome: Progressing     Problem: Chronic Conditions and Co-morbidities  Goal: Patient's chronic conditions and co-morbidity symptoms are monitored and maintained or improved  6/23/2025 1920 by Bella Drew RN  Outcome: Progressing  6/23/2025 1216 by Kiana Larios RN  Outcome: Progressing     Problem: Cardiovascular - Adult  Goal: Maintains optimal cardiac output and hemodynamic stability  6/23/2025 1920 by Bella Drew RN  Outcome: Progressing  6/23/2025 1216 by Kiana Larios RN  Outcome: Progressing     Problem: Gastrointestinal - Adult  Goal: Minimal or absence of nausea and vomiting  6/23/2025 1920 by Bella Drew RN  Outcome: Progressing  6/23/2025 1216 by Kiana Larios RN  Outcome: Progressing  Goal: Maintains or returns to baseline bowel function  6/23/2025

## 2025-06-23 NOTE — PROGRESS NOTES
Physical Therapy  Facility/Department: [address]   Physical Therapy Daily Treatment Note    Patient Name: Vannesa Dasilva        MRN: [id number]    : 1985    Date of Service: 2025    Chief Complaint   Patient presents with    Leg Swelling     Past Medical History:  has a past medical history of History of heroin use.  Past Surgical History:  has a past surgical history that includes Tubal ligation.    Discharge Recommendations  Discharge Recommendations: Patient would benefit from continued therapy after discharge  PT Equipment Recommendations  Equipment Needed: No  Other: unable to amb at this time    Assessment  Body Structures, Functions, Activity Limitations Requiring Skilled Therapeutic Intervention: Decreased functional mobility ;Decreased ADL status;Decreased endurance;Decreased safe awareness;Decreased strength;Decreased posture;Decreased balance;Increased pain  Assessment: Pt required maxAx2 to progress trunk and BLE during bed mobility. Pt sat at EOB ~ 25 min with SBA. Pt completed STS transfers with modA x2, standing ~1.5 min total. Pt is not currently at independent PLOF. Pt would benefit from continued acute care physical therapy to progress functional mobility  Therapy Prognosis: Good  Decision Making: Medium Complexity  Activity Tolerance  Activity Tolerance: Patient limited by pain;Treatment limited secondary to medical complications  Activity Tolerance Comments: highest  BPM and highest resp rate 61 breaths/min; edu on proper breathing technique and HR <100 bpm and resp rate <20  Safety Devices  Type of Devices: Bed alarm in place;Call light within reach;Nurse notified;Left in bed;Gait belt  Restraints  Restraints Initially in Place: No    AM-PAC  AM-PAC Basic Mobility - Inpatient   How much help is needed turning from your back to your side while in a flat bed without using bedrails?: A Lot  How much help is needed moving from lying on your back to sitting on the side of a  balance assessed with RW    Exercise  PT Exercises  Exercise Treatment: Supine Exercises: Ankle Pumps, Gluteal sets, Hamstring Sets, Heel Slides, Hip ABD/ADD, Quad Sets. Reps: 10x    Patient Education  Patient Education  Education Given To: Patient  Education Provided: Role of Therapy;Plan of Care;Home Exercise Program;Energy Conservation;Transfer Training  Education Provided Comments: supine LE ex packet provided  Education Method: Demonstration;Verbal;Teach Back;Printed Information/Hand-outs  Barriers to Learning: None  Education Outcome: Verbalized understanding;Demonstrated understanding;Continued education needed    Plan  Physical Therapy Plan  General Plan:  (5-6x per week)  Current Treatment Recommendations: Strengthening, Balance training, Functional mobility training, Gait training, Transfer training, Neuromuscular re-education, Endurance training, Stair training, Home exercise program, Therapeutic activities, Safety education & training, Patient/Caregiver education & training, Equipment evaluation, education, & procurement    Goals  Short Term Goals  Time Frame for Short Term Goals: 14 visits  Short Term Goal 1: Pt will ambulate 100ft mod (i) and RW or least restrictive AD  Short Term Goal 2: Pt will complete all bed mobility independently  Short Term Goal 3: Pt will complete all transfers mod (i) and RW or least restrictive AD  Short Term Goal 4: Pt will navigate 3 steps with bilateral rails and CGA.    Minutes  PT Individual Minutes  Time In: 1345  Time Out: 1429  Minutes: 44  Time Code Minutes  Timed Code Treatment Minutes: 23 Minutes  Co- treatment with OT warranted secondary to decreased patient safety and independence with functional mobility requiring skilled physical assistance of two professionals to simultaneously address individualized discipline goals. PT is addressing LE strengthening and transfers, while OT is addressing their individualized functional mobility/self-care task.

## 2025-06-23 NOTE — PROGRESS NOTES
Comprehensive Nutrition Assessment    Type and Reason for Visit:  Initial, LOS    Nutrition Recommendations/Plan:   Regular diet.     Malnutrition Assessment:  Malnutrition Status:  No malnutrition (06/23/25 1435)     Nutrition Assessment:    Patient is seen for length of stay. Patient is a 40 y.o. female admitted for sepsis. Regular diet order in place. Weight hx reviewed, weight appears to have remained relatively stable x ~4 years with UBW of ~300#s. During visit patient was lethargic. Patient endorses good appetite/po intake and denies any recent changes in appetite/weight.    Nutrition Related Findings:    Meds/labs reviewed. Wound Type: Venous Stasis (cellulitis)       Current Nutrition Intake & Therapies:    Average Meal Intake: 51-75%, %  Average Supplements Intake: None Ordered  ADULT DIET; Regular    Anthropometric Measures:  Height: 152.4 cm (5')  Ideal Body Weight (IBW): 100 lbs (45 kg)    Current Body Weight: 137 kg (302 lb 0.5 oz), 302 % IBW. Weight Source: Stated  Current BMI (kg/m2): 59  Weight Adjustment For: No Adjustment  BMI Categories: Obese Class 3 (BMI 40.0 or greater)    Estimated Daily Nutrient Needs:  Energy Requirements Based On: Kcal/kg  Weight Used for Energy Requirements: Current  Energy (kcal/day): 1300-1500kcals/day  Weight Used for Protein Requirements: Ideal  Protein (g/day): 70-90g protein/day  Method Used for Fluid Requirements: 1 ml/kcal  Fluid (ml/day): or per medical team    Nutrition Diagnosis:   No nutrition diagnosis at this time related to   as evidenced by      Nutrition Interventions:   Food and/or Nutrient Delivery: Continue Current Diet  Nutrition Education/Counseling: Survival skills/brief education completed  Coordination of Nutrition Care: Continue to monitor while inpatient  Plan of Care discussed with: Patient    Goals:  Goals: Meet at least 75% of estimated needs, prior to discharge  Type of Goal: New goal  Previous Goal Met: New Goal    Nutrition Monitoring

## 2025-06-24 LAB
ALBUMIN SERPL-MCNC: 2.1 G/DL (ref 3.5–5.2)
ALBUMIN/GLOB SERPL: 0.5 {RATIO} (ref 1–2.5)
ALP SERPL-CCNC: 117 U/L (ref 35–104)
ALT SERPL-CCNC: 135 U/L (ref 10–35)
ANION GAP SERPL CALCULATED.3IONS-SCNC: 11 MMOL/L (ref 9–16)
AST SERPL-CCNC: 114 U/L (ref 10–35)
BASOPHILS # BLD: 0 K/UL (ref 0–0.2)
BASOPHILS NFR BLD: 0 % (ref 0–2)
BILIRUB SERPL-MCNC: 0.6 MG/DL (ref 0–1.2)
BUN SERPL-MCNC: 8 MG/DL (ref 6–20)
CALCIUM SERPL-MCNC: 7.8 MG/DL (ref 8.6–10.4)
CHLORIDE SERPL-SCNC: 100 MMOL/L (ref 98–107)
CO2 SERPL-SCNC: 20 MMOL/L (ref 20–31)
CREAT SERPL-MCNC: 0.5 MG/DL (ref 0.6–0.9)
EOSINOPHIL # BLD: 0 K/UL (ref 0–0.4)
EOSINOPHILS RELATIVE PERCENT: 0 % (ref 1–4)
ERYTHROCYTE [DISTWIDTH] IN BLOOD BY AUTOMATED COUNT: 28.7 % (ref 11.8–14.4)
GFR, ESTIMATED: >90 ML/MIN/1.73M2
GLUCOSE BLD-MCNC: 101 MG/DL (ref 65–105)
GLUCOSE BLD-MCNC: 111 MG/DL (ref 65–105)
GLUCOSE BLD-MCNC: 94 MG/DL (ref 65–105)
GLUCOSE BLD-MCNC: 99 MG/DL (ref 65–105)
GLUCOSE SERPL-MCNC: 91 MG/DL (ref 74–99)
HCT VFR BLD AUTO: 28.3 % (ref 36.3–47.1)
HGB BLD-MCNC: 7.6 G/DL (ref 11.9–15.1)
IMM GRANULOCYTES # BLD AUTO: 0.49 K/UL (ref 0–0.3)
IMM GRANULOCYTES NFR BLD: 4 %
LYMPHOCYTES NFR BLD: 1.85 K/UL (ref 1–4.8)
LYMPHOCYTES RELATIVE PERCENT: 15 % (ref 24–44)
MCH RBC QN AUTO: 19.5 PG (ref 25.2–33.5)
MCHC RBC AUTO-ENTMCNC: 26.9 G/DL (ref 28.4–34.8)
MCV RBC AUTO: 72.6 FL (ref 82.6–102.9)
MICROORGANISM SPEC CULT: NORMAL
MONOCYTES NFR BLD: 0.62 K/UL (ref 0.1–0.8)
MONOCYTES NFR BLD: 5 % (ref 1–7)
MORPHOLOGY: ABNORMAL
NEUTROPHILS NFR BLD: 76 % (ref 36–66)
NEUTS SEG NFR BLD: 9.34 K/UL (ref 1.8–7.7)
NRBC BLD-RTO: 0.3 PER 100 WBC
PLATELET # BLD AUTO: ABNORMAL K/UL (ref 138–453)
PLATELET, FLUORESCENCE: 258 K/UL (ref 138–453)
PLATELETS.RETICULATED NFR BLD AUTO: 5.3 % (ref 1.1–10.3)
POTASSIUM SERPL-SCNC: 4.2 MMOL/L (ref 3.7–5.3)
PROT SERPL-MCNC: 6.1 G/DL (ref 6.6–8.7)
RBC # BLD AUTO: 3.9 M/UL (ref 3.95–5.11)
SERVICE CMNT-IMP: NORMAL
SODIUM SERPL-SCNC: 131 MMOL/L (ref 136–145)
SPECIMEN DESCRIPTION: NORMAL
WBC OTHER # BLD: 12.3 K/UL (ref 3.5–11.3)

## 2025-06-24 PROCEDURE — 6370000000 HC RX 637 (ALT 250 FOR IP): Performed by: HOSPITALIST

## 2025-06-24 PROCEDURE — 97116 GAIT TRAINING THERAPY: CPT

## 2025-06-24 PROCEDURE — 97110 THERAPEUTIC EXERCISES: CPT

## 2025-06-24 PROCEDURE — 2060000000 HC ICU INTERMEDIATE R&B

## 2025-06-24 PROCEDURE — 97535 SELF CARE MNGMENT TRAINING: CPT

## 2025-06-24 PROCEDURE — 82947 ASSAY GLUCOSE BLOOD QUANT: CPT

## 2025-06-24 PROCEDURE — APPSS30 APP SPLIT SHARED TIME 16-30 MINUTES: Performed by: NURSE PRACTITIONER

## 2025-06-24 PROCEDURE — 85025 COMPLETE CBC W/AUTO DIFF WBC: CPT

## 2025-06-24 PROCEDURE — 6370000000 HC RX 637 (ALT 250 FOR IP): Performed by: INTERNAL MEDICINE

## 2025-06-24 PROCEDURE — 99233 SBSQ HOSP IP/OBS HIGH 50: CPT | Performed by: INTERNAL MEDICINE

## 2025-06-24 PROCEDURE — 80053 COMPREHEN METABOLIC PANEL: CPT

## 2025-06-24 PROCEDURE — 85055 RETICULATED PLATELET ASSAY: CPT

## 2025-06-24 PROCEDURE — 99232 SBSQ HOSP IP/OBS MODERATE 35: CPT | Performed by: INTERNAL MEDICINE

## 2025-06-24 PROCEDURE — 6360000002 HC RX W HCPCS

## 2025-06-24 PROCEDURE — 2500000003 HC RX 250 WO HCPCS

## 2025-06-24 PROCEDURE — 36415 COLL VENOUS BLD VENIPUNCTURE: CPT

## 2025-06-24 PROCEDURE — 97530 THERAPEUTIC ACTIVITIES: CPT

## 2025-06-24 RX ORDER — LACTOBACILLUS RHAMNOSUS GG 10B CELL
1 CAPSULE ORAL DAILY
Qty: 14 CAPSULE | Refills: 0 | Status: SHIPPED | OUTPATIENT
Start: 2025-06-24 | End: 2025-07-08

## 2025-06-24 RX ORDER — AMOXICILLIN 500 MG/1
500 CAPSULE ORAL EVERY 8 HOURS SCHEDULED
Qty: 30 CAPSULE | Refills: 0 | Status: SHIPPED | OUTPATIENT
Start: 2025-06-24 | End: 2025-06-25

## 2025-06-24 RX ORDER — AMOXICILLIN 500 MG/1
500 CAPSULE ORAL EVERY 8 HOURS SCHEDULED
Status: DISCONTINUED | OUTPATIENT
Start: 2025-06-24 | End: 2025-06-28 | Stop reason: HOSPADM

## 2025-06-24 RX ORDER — MINERAL OIL/HYDROPHIL PETROLAT
OINTMENT (GRAM) TOPICAL
Qty: 99 G | Refills: 2 | Status: SHIPPED | OUTPATIENT
Start: 2025-06-24

## 2025-06-24 RX ADMIN — FENTANYL CITRATE 25 MCG: 50 INJECTION INTRAMUSCULAR; INTRAVENOUS at 14:18

## 2025-06-24 RX ADMIN — HEPARIN SODIUM 5000 UNITS: 5000 INJECTION INTRAVENOUS; SUBCUTANEOUS at 14:19

## 2025-06-24 RX ADMIN — AMOXICILLIN 500 MG: 500 CAPSULE ORAL at 14:19

## 2025-06-24 RX ADMIN — TRAZODONE HYDROCHLORIDE 50 MG: 50 TABLET ORAL at 21:13

## 2025-06-24 RX ADMIN — SERTRALINE HYDROCHLORIDE 100 MG: 50 TABLET ORAL at 09:11

## 2025-06-24 RX ADMIN — SODIUM CHLORIDE, PRESERVATIVE FREE 10 ML: 5 INJECTION INTRAVENOUS at 21:14

## 2025-06-24 RX ADMIN — SODIUM CHLORIDE, PRESERVATIVE FREE 10 ML: 5 INJECTION INTRAVENOUS at 09:24

## 2025-06-24 RX ADMIN — QUETIAPINE FUMARATE 200 MG: 200 TABLET ORAL at 21:13

## 2025-06-24 RX ADMIN — AMOXICILLIN 500 MG: 500 CAPSULE ORAL at 21:13

## 2025-06-24 RX ADMIN — HEPARIN SODIUM 5000 UNITS: 5000 INJECTION INTRAVENOUS; SUBCUTANEOUS at 06:33

## 2025-06-24 RX ADMIN — WHITE PETROLATUM: 1.75 OINTMENT TOPICAL at 09:11

## 2025-06-24 RX ADMIN — FENTANYL CITRATE 25 MCG: 50 INJECTION INTRAMUSCULAR; INTRAVENOUS at 09:35

## 2025-06-24 RX ADMIN — WHITE PETROLATUM: 1.75 OINTMENT TOPICAL at 21:14

## 2025-06-24 RX ADMIN — FENTANYL CITRATE 25 MCG: 50 INJECTION INTRAMUSCULAR; INTRAVENOUS at 21:15

## 2025-06-24 RX ADMIN — HEPARIN SODIUM 5000 UNITS: 5000 INJECTION INTRAVENOUS; SUBCUTANEOUS at 21:14

## 2025-06-24 ASSESSMENT — PAIN SCALES - GENERAL
PAINLEVEL_OUTOF10: 0
PAINLEVEL_OUTOF10: 9
PAINLEVEL_OUTOF10: 7
PAINLEVEL_OUTOF10: 7
PAINLEVEL_OUTOF10: 0

## 2025-06-24 ASSESSMENT — PAIN DESCRIPTION - DESCRIPTORS
DESCRIPTORS: THROBBING
DESCRIPTORS: ACHING;DISCOMFORT
DESCRIPTORS: THROBBING

## 2025-06-24 ASSESSMENT — PAIN DESCRIPTION - ORIENTATION
ORIENTATION: LEFT

## 2025-06-24 ASSESSMENT — PAIN DESCRIPTION - LOCATION
LOCATION: LEG

## 2025-06-24 ASSESSMENT — PAIN SCALES - WONG BAKER
WONGBAKER_NUMERICALRESPONSE: NO HURT
WONGBAKER_NUMERICALRESPONSE: NO HURT

## 2025-06-24 NOTE — PROGRESS NOTES
Occupational Therapy  Occupational Therapy Daily Treatment Note  Facility/Department: [address] [facility]   Patient Name: Vannesa Dasilva        MRN: [id number]    : 1985    Date of Service: 2025    Chief Complaint   Patient presents with    Leg Swelling     Past Medical History:  has a past medical history of History of heroin use.  Past Surgical History:  has a past surgical history that includes Tubal ligation.    Discharge Recommendations  Discharge Recommendations: Patient would benefit from continued therapy after discharge       Assessment  Performance deficits / Impairments: Decreased functional mobility ;Decreased ADL status;Decreased strength;Decreased safe awareness;Decreased high-level IADLs;Decreased balance;Decreased endurance  Assessment: Pt limited by above deficits impacting pts functional mobility/ADL performance. Pt is expected to require skilled OT services to increase safety and promote increased independence t/o ADL/IADLs and functional mobility tasks.  Prognosis: Fair  Activity Tolerance  Activity Tolerance: Patient limited by fatigue;Patient limited by pain  Safety Devices  Type of Devices: Call light within reach;Nurse notified;Gait belt;Left in chair;Patient at risk for falls;Heels elevated for pressure relief  Restraints  Restraints Initially in Place: No    AM-PAC  AM-[facility] Daily Activity - Inpatient   How much help is needed for putting on and taking off regular lower body clothing?: A Lot  How much help is needed for bathing (which includes washing, rinsing, drying)?: A Lot  How much help is needed for toileting (which includes using toilet, bedpan, or urinal)?: A Lot  How much help is needed for putting on and taking off regular upper body clothing?: A Little  How much help is needed for taking care of personal grooming?: A Little  How much help for eating meals?: None  AM-[facility] Inpatient Daily Activity Raw Score: 16  AM-PAC Inpatient ADL T-Scale Score : 35.96  ADL Inpatient CMS

## 2025-06-24 NOTE — CARE COORDINATION
Case Management   Daily Progress Note       Patient Name: Vannesa Dasilva                   YOB: 1985  Diagnosis: Shortness of breath [R06.02]  Hypokalemia [E87.6]  HOMA (acute kidney injury) [N17.9]  Sepsis (HCC) [A41.9]  Anemia, unspecified type [D64.9]  Sepsis, due to unspecified organism, unspecified whether acute organ dysfunction present (HCC) [A41.9]                       GMLOS: 3.5 days  Length of Stay: 8  days    Anticipated Discharge Date: To be determined    Readmission Risk (Low < 19, Mod (19-27), High > 27): Readmission Risk Score: 16.9        Current Transitional Plan    [x] Home Independently    [] Home with HC    [] Skilled Nursing Facility    [] Acute Rehabilitation    [] Long Term Acute Care (LTAC)    [] Other:     Plan for the Stay (Medical Management) :          Workflow Continuation (Additional Notes) :  Talked with patient about rehab-she is max assist X2. She is wanting to go home with her family.    1610 patient requested list for SNF's List given.      Priscilla Saab RN  June 24, 2025

## 2025-06-24 NOTE — PROGRESS NOTES
CLINICAL PHARMACY NOTE: MEDS TO BEDS    Total # of Prescriptions Filled: 4   The following medications were delivered to the patient:  AMOXICILLIN 500MG  ACIDOPHILUS   ELIQUIS 2.5MG   DERMAPHOR OINTMENT     Additional Documentation:

## 2025-06-24 NOTE — PROGRESS NOTES
[facility] - Progress Note    Today's Date and Time: 6/24/2025, 11:28 AM    Impression :   Fall at group home on 6/17/25  Down time 2 hours  Group A Strep (pyogenes) septicemia 2/2 on 6/16/25  Concern for LLE cellulitis  Low-grade fever  BLE edema  Rhabdomyolysis  HOMA  Hyponatremia  Hypokalemia  Lactic acidosis  Leukocytosis  Anemia  Elevated liver enzymes  Emesis  CHF  Obesity  Anxiety  Depression  Hx of heroin abuse-Patient denied IV use    Recommendations:   D/C IV ceftriaxone for strep pyogenes septicemia  Complete treatment with po Amoxicillin x 10 more days  Continue dry dressings and ACE wraps    Discontinued vancomycin and Zosyn on 6/17/25   Discontinued ampicillin on 6-19-25 to reduce fluid and Na loads as patient is on high volume fluids because of rhabdomyolysis    Medical Decision Making/Summary/Discussion:6/24/2025         Elements of Medical Decision Making:  Note: I have independently performed the steps listed below as part of the medical decision making and evaluation.   Examined and discussed with patient.  Severe Lt leg cellulitis  Strep pyogenes septicemia 6-16-25  Rhabdomyolysis  HOMA  Lactic acidosis  Somnolence  CHF  Obesity  Prior Hx of heroin use. Currently patient denies any use  Labs, medications, radiologic studies were reviewed with personal review of films  Radiologic studies  Lab work  Cultures  Blood: Strep pyogenes  Large amounts of data were reviewed  Please see the history of present illness and progress note  Patients with Strep pyogenes in blood usually have a difficult clinical course  Will plan IV ampicillin and if necessary add gentamicin for synergy  Patient currently somnolent but wakes up and answers appropriately. No signs of meningitis noted  Discussed with nursing Staff, Discharge planner  `Dr Syed  Infection Control and Prevention measures reviewed  Universal precaution  All prior entries were reviewed  IM notes  wheezes, rales, or rhonchi.  No dullness to percussion.   Cardiovascular: Regular rate and rhythm without murmurs, rubs, or gallops.   Abdomen: Soft, non tender. Bowel sounds normal. No organomegaly  All four Extremities: Bilateral lower extremity edema, worse on left side, small wound noted to left ankle.  Skin is dry  Neurologic: No gross sensory or motor deficits.  Skin: Warm and dry with good turgor.signs of peripheral arterial or venous insufficiency.  Small left ankle wound with no drainage.       I have personally reviewed the past medical history, past surgical history, medications, social history, and family history, and I have updated the database accordingly.    Past Medical History:     Past Medical History:   Diagnosis Date    History of heroin use 9/16/2020       Past Surgical  History:     Past Surgical History:   Procedure Laterality Date    TUBAL LIGATION         Medications:      cefTRIAXone (ROCEPHIN) IV  2,000 mg IntraVENous Q12H    potassium bicarb-citric acid  40 mEq Oral Once    mineral oil-hydrophilic petrolatum   Topical BID    sodium chloride flush  5-40 mL IntraVENous 2 times per day    QUEtiapine  200 mg Oral Nightly    sertraline  100 mg Oral Daily    traZODone  50 mg Oral Nightly    heparin (porcine)  5,000 Units SubCUTAneous 3 times per day       Social History:     Social History     Socioeconomic History    Marital status: Single     Spouse name: Not on file    Number of children: Not on file    Years of education: Not on file    Highest education level: Not on file   Occupational History    Not on file   Tobacco Use    Smoking status: Every Day     Current packs/day: 0.50     Types: Cigarettes    Smokeless tobacco: Never   Vaping Use    Vaping status: Never Used   Substance and Sexual Activity    Alcohol use: No    Drug use: No     Comment: on Methadone    Sexual activity: Not on file   Other Topics Concern    Not on file   Social History Narrative    Not on file     Social Drivers

## 2025-06-24 NOTE — PROGRESS NOTES
Physical Therapy  Facility/Department: [address]   Physical Therapy Daily Treatment Note    Patient Name: Vannesa Dasilva        MRN: [id number]    : 1985    Date of Service: 2025    Chief Complaint   Patient presents with    Leg Swelling     Past Medical History:  has a past medical history of History of heroin use.  Past Surgical History:  has a past surgical history that includes Tubal ligation.    Discharge Recommendations  Discharge Recommendations: Patient would benefit from continued therapy after discharge  PT Equipment Recommendations  Equipment Needed: No  Other: pt reported owning rollator    Assessment  Body Structures, Functions, Activity Limitations Requiring Skilled Therapeutic Intervention: Decreased functional mobility ;Decreased ADL status;Decreased endurance;Decreased safe awareness;Decreased strength;Decreased posture;Decreased balance;Increased pain  Assessment: Pt required maxAx2 to progress trunk and BLE during bed mobility. Pt completed STS transfers with maxA x2, progressing to modA x2. Pt amb 4' from EOB to chair with maxA x2 with RW. Pt most limited by decreased balance, LE weakness and pain. Pt is not currently at independent PLOF; would benefit from continued acute care physical therapy to progress functional mobility  Therapy Prognosis: Good  Decision Making: Medium Complexity  Activity Tolerance  Activity Tolerance: Patient limited by pain;Treatment limited secondary to medical complications;Patient limited by endurance  Activity Tolerance Comments: highest  BPM and highest resp rate 62 breaths/min; edu on proper breathing technique and HR <100 bpm and resp rate <20  Safety Devices  Type of Devices: Call light within reach;Nurse notified;Gait belt;Left in chair;Patient at risk for falls;Heels elevated for pressure relief  Restraints  Restraints Initially in Place: No    AM-PAC  AM-PAC Basic Mobility - Inpatient   How much help is needed turning from your back to  antalgic gait L LE, heavy UE reliance on RW, shuffling steps, wide RUDY, unstable with no complete LOB  Gait Deviations: Shuffles;Slow Regi;Decreased step length;Decreased step height;Increased RUDY  Distance: 4' bed to chair  Comments: Pt required mod cues for sequencing of RW, weight shifting and looking up with good-fair carryover. Extensive time and effort to complete  More Ambulation?: No    Stairs/Curb  Stairs?: No    Balance   Balance  Posture: Fair  Sitting - Static: Good  Sitting - Dynamic: Good;-  Standing - Static: Poor;+  Standing - Dynamic: Poor;-  Comments: seated balance assessed EOB/EOC; standing balance assessed with RW    Exercise  PT Exercises  Exercise Treatment: Supine Exercises: Ankle Pumps, Gluteal sets, Hamstring Sets, AAROM ABD/ADD. Reps: 10x  A/AROM Exercises: seated B LE: LAQ and mini marches x5    Patient Education  Patient Education  Education Given To: Patient  Education Provided: Role of Therapy;Plan of Care;Home Exercise Program;Energy Conservation;Transfer Training;Mobility Training  Education Provided Comments: supine LE ex packet reviewed, importance of mobility  Education Method: Demonstration;Verbal;Teach Back;Printed Information/Hand-outs  Barriers to Learning: None  Education Outcome: Verbalized understanding;Demonstrated understanding;Continued education needed    Plan  Physical Therapy Plan  General Plan:  (5-6x per week)  Current Treatment Recommendations: Strengthening, Balance training, Functional mobility training, Gait training, Transfer training, Neuromuscular re-education, Endurance training, Stair training, Home exercise program, Therapeutic activities, Safety education & training, Patient/Caregiver education & training, Equipment evaluation, education, & procurement    Goals  Short Term Goals  Time Frame for Short Term Goals: 14 visits  Short Term Goal 1: Pt will ambulate 100ft mod (i) and RW or least restrictive AD  Short Term Goal 2: Pt will complete all bed

## 2025-06-24 NOTE — PLAN OF CARE
Problem: Discharge Planning  Goal: Discharge to home or other facility with appropriate resources  Outcome: Progressing     Problem: Pain  Goal: Verbalizes/displays adequate comfort level or baseline comfort level  Outcome: Progressing  Flowsheets (Taken 6/24/2025 0340 by Bella Drew RN)  Verbalizes/displays adequate comfort level or baseline comfort level: Encourage patient to monitor pain and request assistance     Problem: Skin/Tissue Integrity  Goal: Skin integrity remains intact  Description: 1.  Monitor for areas of redness and/or skin breakdown  2.  Assess vascular access sites hourly  3.  Every 4-6 hours minimum:  Change oxygen saturation probe site  4.  Every 4-6 hours:  If on nasal continuous positive airway pressure, respiratory therapy assess nares and determine need for appliance change or resting period  Outcome: Progressing     Problem: Safety - Adult  Goal: Free from fall injury  Outcome: Progressing     Problem: Chronic Conditions and Co-morbidities  Goal: Patient's chronic conditions and co-morbidity symptoms are monitored and maintained or improved  Outcome: Progressing     Problem: Cardiovascular - Adult  Goal: Maintains optimal cardiac output and hemodynamic stability  Outcome: Progressing     Problem: Gastrointestinal - Adult  Goal: Minimal or absence of nausea and vomiting  Outcome: Progressing  Goal: Maintains or returns to baseline bowel function  Outcome: Progressing     Problem: ABCDS Injury Assessment  Goal: Absence of physical injury  Outcome: Progressing     Problem: Nutrition Deficit:  Goal: Optimize nutritional status  Outcome: Progressing

## 2025-06-24 NOTE — PLAN OF CARE
Vannesa Dasilva was evaluated today and a DME order was entered for a bariatric wheelchair because she requires this to successfully complete daily living tasks of personal cares, grooming, hygiene, meal preparation, and taking own medications.  A standard manual wheelchair is necessary due to patient's impaired ambulation and mobility restrictions and would be unable to resolve these daily living tasks using a cane or walker.  The patient is capable of using a standard wheelchair safely in their home and can maneuver within their home with adequate access.  There is a caregiver available to provide necessary assistance.  The need for this equipment was discussed with the patient and she understands, is in agreement, and has not expressed an unwillingness to use the wheelchair.

## 2025-06-24 NOTE — DISCHARGE INSTRUCTIONS
- continue to take amoxicillin for 10 more days at home for your left infection. We are giving you probiotics on discharge to prevent a gastrointestinal infection. If you develop severe diarrhea please come back to the hospital   - continue to change your dressing as directed to you by your nursing staff. You will be referred to wound nurse on discharge  - establish care with Dr. Leonard (infectious disease) on discharge for your infection   - please follow up with your primary care physician within one week of discharge   - we are prescribing you eliquis at home to prevent clots from forming in your leg. Please take this for the next month until your regain your mobility     -If you begin to experience any symptoms such as chest pain, shortness of breath, nausea, vomiting, dizziness, drowsiness, abdominal pain, loss of consciousness, or any other symptoms you find concerning please return to the ED for follow-up evaluation.  -If you have been given medication please take them as prescribed. Do not take more medication than recommended at any given time.   -Please feel free return to the hospital if your symptoms worsen or any new concerning symptoms develop.  Follow-up with your primary care physician as needed for all other the concerns.

## 2025-06-24 NOTE — PROGRESS NOTES
[facility]  Internal Medicine Teaching Residency Program  Inpatient Daily Progress Note  ______________________________________________________________________________    Patient: Vannesa Dasilva  YOB: 1985   MRN:[id number]    Acct: [id number]     Room: 0539/0539-01  Admit date: 6/16/2025  Today's date: 06/24/25  Number of days in the hospital: 8    SUBJECTIVE   Admitting Diagnosis: Sepsis (HCC)  CC: Left leg pain and swelling    Pt examined at bedside. Chart & results reviewed.   No acute events overnight    Patient is hemodynamically stable with blood pressure 123/74, afebrile currently on room air, saturation of 95%   urine output 3.7 L/24 hours    Morning labs reviewed  Sodium 131, potassium 4.2, creatinine 0.5, BUN 8  CBC pending  Blood culture 6/16/2025-2 /2 positive for Streptococcus pyogenes    MRI left lower extremity-extensive cellulitis.  Probable superficial fasciitis.  Concern for osteomyelitis of proximal tibia.  MRI left ankle-extensive cellulitis.  Negative for osteomyelitis in ankle  - CT left tibia-fibula 6/20/2025-concerning for abscess    Plan  - Continue ceftriaxone for as per ID  - CT left fibula, tibia concerning for abscess, general surgery and wound care on board will appreciate recommendation  - PT OT work up    BRIEF HISTORY     The patient is a pleasant 39 y.o. female with a PMHx significant for      Bilateral lower extremity lymphadenopathy  Anxiety/depression  Chronic iron deficiency anemia  Obesity hypoventilation syndrome     presents with a chief complaint of left leg pain and swelling.  According to the patient she was incarcerated yesterday and she states that she was in senior living and she fell down and was left in same position for more than 2 hours.  After that she was having problem while walking.    Patient does have bilateral lower extremity nonpitting edema.  Patient denies losing consciousness or hitting her head when  QUEtiapine  200 mg Oral Nightly    sertraline  100 mg Oral Daily    traZODone  50 mg Oral Nightly    heparin (porcine)  5,000 Units SubCUTAneous 3 times per day     Continuous Infusions:    sodium chloride      sodium chloride      sodium chloride Stopped (06/18/25 0456)     PRN MedicationsoxyCODONE-acetaminophen, 1 tablet, Q6H PRN  fentanNYL, 25 mcg, Q4H PRN  albuterol, 2.5 mg, Q6H PRN  sodium chloride, , PRN  ondansetron, 4 mg, Q4H PRN  midodrine, 10 mg, TID PRN  sodium chloride, , PRN  sodium chloride flush, 5-40 mL, PRN  sodium chloride, , PRN  potassium chloride, 40 mEq, PRN   Or  potassium alternative oral replacement, 40 mEq, PRN   Or  potassium chloride, 10 mEq, PRN  magnesium sulfate, 2,000 mg, PRN  ondansetron, 4 mg, Q6H PRN  polyethylene glycol, 17 g, Daily PRN  acetaminophen, 650 mg, Q6H PRN   Or  acetaminophen, 650 mg, Q6H PRN        Diagnostic Labs:  CBC:   Recent Labs     06/22/25  0536 06/23/25  0719   WBC 16.0* 11.2   RBC 4.05 3.78*   HGB 8.0* 7.5*   HCT 27.9* 27.6*   MCV 68.9* 73.0*   RDW 26.8* 27.7*   PLT See Reflexed IPF Result See Reflexed IPF Result     BMP:   Recent Labs     06/22/25  0536 06/23/25  0719 06/24/25  0350   * 132* 131*   K 4.1 4.1 4.2    101 100   CO2 21 19* 20   BUN 8 8 8   CREATININE 0.4* 0.4* 0.5*     BNP: No results for input(s): \"BNP\" in the last 72 hours.  PT/INR:   No results for input(s): \"PROTIME\", \"INR\" in the last 72 hours.    APTT: No results for input(s): \"APTT\" in the last 72 hours.  CARDIAC ENZYMES: No results for input(s): \"CKMB\", \"CKMBINDEX\", \"TROPONINI\" in the last 72 hours.    Invalid input(s): \"CKTOTAL;3\"  FASTING LIPID PANEL:  Lab Results   Component Value Date    CHOL 62 06/17/2025    HDL 10 (L) 06/17/2025    TRIG 252 (H) 06/17/2025     LIVER PROFILE:   Recent Labs     06/22/25  0536 06/23/25  0719 06/24/25  0350   * 140* 114*   * 137* 135*   BILITOT 0.5 0.6 0.6   ALKPHOS 111* 88 117*      MICROBIOLOGY:   Lab Results   Component

## 2025-06-25 LAB
ALBUMIN SERPL-MCNC: 2.2 G/DL (ref 3.5–5.2)
ALBUMIN/GLOB SERPL: 0.5 {RATIO} (ref 1–2.5)
ALP SERPL-CCNC: 112 U/L (ref 35–104)
ALT SERPL-CCNC: 115 U/L (ref 10–35)
ANION GAP SERPL CALCULATED.3IONS-SCNC: 11 MMOL/L (ref 9–16)
AST SERPL-CCNC: 93 U/L (ref 10–35)
BASOPHILS # BLD: 0 K/UL (ref 0–0.2)
BASOPHILS NFR BLD: 0 % (ref 0–2)
BILIRUB SERPL-MCNC: 0.7 MG/DL (ref 0–1.2)
BUN SERPL-MCNC: 8 MG/DL (ref 6–20)
CALCIUM SERPL-MCNC: 8 MG/DL (ref 8.6–10.4)
CHLORIDE SERPL-SCNC: 99 MMOL/L (ref 98–107)
CO2 SERPL-SCNC: 22 MMOL/L (ref 20–31)
CREAT SERPL-MCNC: 0.5 MG/DL (ref 0.6–0.9)
EOSINOPHIL # BLD: 0.09 K/UL (ref 0–0.4)
EOSINOPHILS RELATIVE PERCENT: 1 % (ref 1–4)
ERYTHROCYTE [DISTWIDTH] IN BLOOD BY AUTOMATED COUNT: 28.8 % (ref 11.8–14.4)
GFR, ESTIMATED: >90 ML/MIN/1.73M2
GLUCOSE BLD-MCNC: 103 MG/DL (ref 65–105)
GLUCOSE BLD-MCNC: 111 MG/DL (ref 65–105)
GLUCOSE SERPL-MCNC: 102 MG/DL (ref 74–99)
HCT VFR BLD AUTO: 26.2 % (ref 36.3–47.1)
HGB BLD-MCNC: 7.3 G/DL (ref 11.9–15.1)
IMM GRANULOCYTES # BLD AUTO: 0.56 K/UL (ref 0–0.3)
IMM GRANULOCYTES NFR BLD: 6 %
INTERVENTION: NORMAL
LYMPHOCYTES NFR BLD: 1.69 K/UL (ref 1–4.8)
LYMPHOCYTES RELATIVE PERCENT: 18 % (ref 24–44)
MCH RBC QN AUTO: 20.1 PG (ref 25.2–33.5)
MCHC RBC AUTO-ENTMCNC: 27.9 G/DL (ref 28.4–34.8)
MCV RBC AUTO: 72.2 FL (ref 82.6–102.9)
MONOCYTES NFR BLD: 0.19 K/UL (ref 0.1–0.8)
MONOCYTES NFR BLD: 2 % (ref 1–7)
MORPHOLOGY: ABNORMAL
NEUTROPHILS NFR BLD: 73 % (ref 36–66)
NEUTS SEG NFR BLD: 6.87 K/UL (ref 1.8–7.7)
NRBC BLD-RTO: 0 PER 100 WBC
PLATELET # BLD AUTO: ABNORMAL K/UL (ref 138–453)
PLATELET, FLUORESCENCE: 231 K/UL (ref 138–453)
PLATELETS.RETICULATED NFR BLD AUTO: 3.7 % (ref 1.1–10.3)
POTASSIUM SERPL-SCNC: 3.9 MMOL/L (ref 3.7–5.3)
PROT SERPL-MCNC: 6.5 G/DL (ref 6.6–8.7)
RBC # BLD AUTO: 3.63 M/UL (ref 3.95–5.11)
SODIUM SERPL-SCNC: 132 MMOL/L (ref 136–145)
WBC OTHER # BLD: 9.4 K/UL (ref 3.5–11.3)

## 2025-06-25 PROCEDURE — 6370000000 HC RX 637 (ALT 250 FOR IP): Performed by: HOSPITALIST

## 2025-06-25 PROCEDURE — 6370000000 HC RX 637 (ALT 250 FOR IP)

## 2025-06-25 PROCEDURE — 82947 ASSAY GLUCOSE BLOOD QUANT: CPT

## 2025-06-25 PROCEDURE — 6360000002 HC RX W HCPCS

## 2025-06-25 PROCEDURE — 99232 SBSQ HOSP IP/OBS MODERATE 35: CPT | Performed by: INTERNAL MEDICINE

## 2025-06-25 PROCEDURE — 36415 COLL VENOUS BLD VENIPUNCTURE: CPT

## 2025-06-25 PROCEDURE — 99233 SBSQ HOSP IP/OBS HIGH 50: CPT | Performed by: INTERNAL MEDICINE

## 2025-06-25 PROCEDURE — 2500000003 HC RX 250 WO HCPCS

## 2025-06-25 PROCEDURE — 6370000000 HC RX 637 (ALT 250 FOR IP): Performed by: INTERNAL MEDICINE

## 2025-06-25 PROCEDURE — 2060000000 HC ICU INTERMEDIATE R&B

## 2025-06-25 PROCEDURE — 85055 RETICULATED PLATELET ASSAY: CPT

## 2025-06-25 PROCEDURE — 80053 COMPREHEN METABOLIC PANEL: CPT

## 2025-06-25 PROCEDURE — 85025 COMPLETE CBC W/AUTO DIFF WBC: CPT

## 2025-06-25 PROCEDURE — APPSS30 APP SPLIT SHARED TIME 16-30 MINUTES: Performed by: NURSE PRACTITIONER

## 2025-06-25 RX ORDER — AMOXICILLIN 500 MG/1
500 CAPSULE ORAL EVERY 8 HOURS SCHEDULED
DISCHARGE
Start: 2025-06-25 | End: 2025-06-27

## 2025-06-25 RX ADMIN — WHITE PETROLATUM: 1.75 OINTMENT TOPICAL at 08:33

## 2025-06-25 RX ADMIN — SODIUM CHLORIDE, PRESERVATIVE FREE 10 ML: 5 INJECTION INTRAVENOUS at 08:34

## 2025-06-25 RX ADMIN — HEPARIN SODIUM 5000 UNITS: 5000 INJECTION INTRAVENOUS; SUBCUTANEOUS at 06:34

## 2025-06-25 RX ADMIN — FENTANYL CITRATE 25 MCG: 50 INJECTION INTRAMUSCULAR; INTRAVENOUS at 20:58

## 2025-06-25 RX ADMIN — SERTRALINE HYDROCHLORIDE 100 MG: 50 TABLET ORAL at 08:33

## 2025-06-25 RX ADMIN — POLYETHYLENE GLYCOL 3350 17 G: 17 POWDER, FOR SOLUTION ORAL at 11:10

## 2025-06-25 RX ADMIN — HEPARIN SODIUM 5000 UNITS: 5000 INJECTION INTRAVENOUS; SUBCUTANEOUS at 20:59

## 2025-06-25 RX ADMIN — TRAZODONE HYDROCHLORIDE 50 MG: 50 TABLET ORAL at 20:59

## 2025-06-25 RX ADMIN — AMOXICILLIN 500 MG: 500 CAPSULE ORAL at 20:59

## 2025-06-25 RX ADMIN — AMOXICILLIN 500 MG: 500 CAPSULE ORAL at 06:34

## 2025-06-25 RX ADMIN — HEPARIN SODIUM 5000 UNITS: 5000 INJECTION INTRAVENOUS; SUBCUTANEOUS at 14:37

## 2025-06-25 RX ADMIN — WHITE PETROLATUM: 1.75 OINTMENT TOPICAL at 20:58

## 2025-06-25 RX ADMIN — AMOXICILLIN 500 MG: 500 CAPSULE ORAL at 14:37

## 2025-06-25 RX ADMIN — QUETIAPINE FUMARATE 200 MG: 200 TABLET ORAL at 21:00

## 2025-06-25 RX ADMIN — SODIUM CHLORIDE, PRESERVATIVE FREE 10 ML: 5 INJECTION INTRAVENOUS at 21:00

## 2025-06-25 ASSESSMENT — PAIN DESCRIPTION - DESCRIPTORS: DESCRIPTORS: ACHING;DISCOMFORT

## 2025-06-25 ASSESSMENT — PAIN DESCRIPTION - LOCATION: LOCATION: LEG

## 2025-06-25 ASSESSMENT — PAIN SCALES - WONG BAKER: WONGBAKER_NUMERICALRESPONSE: NO HURT

## 2025-06-25 ASSESSMENT — PAIN DESCRIPTION - ORIENTATION: ORIENTATION: LEFT

## 2025-06-25 ASSESSMENT — PAIN SCALES - GENERAL
PAINLEVEL_OUTOF10: 8
PAINLEVEL_OUTOF10: 0

## 2025-06-25 NOTE — PROGRESS NOTES
[facility]  Internal Medicine Teaching Residency Program  Inpatient Daily Progress Note  ______________________________________________________________________________    Patient: Vannesa Dasilva  YOB: 1985   MRN:[id number]    Acct: [id number]     Room: 0539/0539-01  Admit date: 6/16/2025  Today's date: 06/25/25  Number of days in the hospital: 9    SUBJECTIVE   Admitting Diagnosis: Sepsis (HCC)  CC: Left leg pain and swelling    Pt examined at bedside. Chart & results reviewed.   No acute events overnight    Patient is hemodynamically stable with blood pressure 121/58, afebrile currently on room air, saturation of 95%       Morning labs reviewed  Pending  Blood culture 6/16/2025-2 /2 positive for Streptococcus pyogenes    MRI left lower extremity-extensive cellulitis.  Probable superficial fasciitis.  Concern for osteomyelitis of proximal tibia.  MRI left ankle-extensive cellulitis.  Negative for osteomyelitis in ankle  - CT left tibia-fibula 6/20/2025-concerning for abscess    Plan  Continue amoxicillin 500 Mg every 8 hours.  Stop date 7/4/2025  Continue dry dressing and Ace wrapping of left leg  Medically discharge pending placement    BRIEF HISTORY     The patient is a pleasant 39 y.o. female with a PMHx significant for      Bilateral lower extremity lymphadenopathy  Anxiety/depression  Chronic iron deficiency anemia  Obesity hypoventilation syndrome     presents with a chief complaint of left leg pain and swelling.  According to the patient she was incarcerated yesterday and she states that she was in shelter and she fell down and was left in same position for more than 2 hours.  After that she was having problem while walking.    Patient does have bilateral lower extremity nonpitting edema.  Patient denies losing consciousness or hitting her head when she fell.  Patient is not on any anticoagulation.  She denies chest pain, shortness of breath, fever,

## 2025-06-25 NOTE — PLAN OF CARE
Problem: Discharge Planning  Goal: Discharge to home or other facility with appropriate resources  Outcome: Progressing     Problem: Pain  Goal: Verbalizes/displays adequate comfort level or baseline comfort level  Outcome: Progressing     Problem: Skin/Tissue Integrity  Goal: Skin integrity remains intact  Description: 1.  Monitor for areas of redness and/or skin breakdown  2.  Assess vascular access sites hourly  3.  Every 4-6 hours minimum:  Change oxygen saturation probe site  4.  Every 4-6 hours:  If on nasal continuous positive airway pressure, respiratory therapy assess nares and determine need for appliance change or resting period  Outcome: Progressing     Problem: Safety - Adult  Goal: Free from fall injury  Outcome: Progressing     Problem: Chronic Conditions and Co-morbidities  Goal: Patient's chronic conditions and co-morbidity symptoms are monitored and maintained or improved  Outcome: Progressing     Problem: Cardiovascular - Adult  Goal: Maintains optimal cardiac output and hemodynamic stability  Outcome: Progressing     Problem: Gastrointestinal - Adult  Goal: Minimal or absence of nausea and vomiting  Outcome: Progressing  Goal: Maintains or returns to baseline bowel function  Outcome: Progressing     Problem: ABCDS Injury Assessment  Goal: Absence of physical injury  Outcome: Progressing     Problem: Nutrition Deficit:  Goal: Optimize nutritional status  Outcome: Progressing

## 2025-06-25 NOTE — CARE COORDINATION
Case Management   Daily Progress Note       Patient Name: Vannesa Dasilva                   YOB: 1985  Diagnosis: Shortness of breath [R06.02]  Hypokalemia [E87.6]  HOMA (acute kidney injury) [N17.9]  Sepsis (HCC) [A41.9]  Anemia, unspecified type [D64.9]  Sepsis, due to unspecified organism, unspecified whether acute organ dysfunction present (HCC) [A41.9]                       GMLOS: 3.5 days  Length of Stay: 9  days    Anticipated Discharge Date: Ready for discharge    Readmission Risk (Low < 19, Mod (19-27), High > 27): Readmission Risk Score: 17.4        Current Transitional Plan    [] Home Independently    [] Home with HC    [x] Skilled Nursing Facility    [] Acute Rehabilitation    [] Long Term Acute Care (LTAC)    [] Other:     Plan for the Stay (Medical Management) :          Workflow Continuation (Additional Notes) :  Talked with patient-chose [facility]- referral sent to [facility]. 1001 message from Ria at [facility]-unable to accept due to drug use. 1300 SNF list that takes patient's with drub history given to patient. She chose [facility]. 1303 referral sent to [facility].    Response from [facility]-Rachel and Khushi-can accept, needs HENS. Will start precert.    PS Dr. Diane Wallace to fill out DOUG and have Dr. Mcgregor sign it. Need DOUG signed correctly before HENS can be done.    1720 HENS complete and put in envelope Rachel from [facility] will pull and submit HENS.      Priscilla Saab, RN  June 25, 2025

## 2025-06-25 NOTE — PLAN OF CARE
Problem: Discharge Planning  Goal: Discharge to home or other facility with appropriate resources  6/24/2025 2013 by Geremias Phillips RN  Outcome: Progressing  Flowsheets (Taken 6/24/2025 2000)  Discharge to home or other facility with appropriate resources:   Identify barriers to discharge with patient and caregiver   Arrange for needed discharge resources and transportation as appropriate   Identify discharge learning needs (meds, wound care, etc)  6/24/2025 1026 by Kiana Larios RN  Outcome: Progressing     Problem: Pain  Goal: Verbalizes/displays adequate comfort level or baseline comfort level  6/24/2025 2013 by Geremias Phillips RN  Outcome: Progressing  6/24/2025 1026 by Kiana Larios RN  Outcome: Progressing  Flowsheets (Taken 6/24/2025 0340 by Bella Drew RN)  Verbalizes/displays adequate comfort level or baseline comfort level: Encourage patient to monitor pain and request assistance     Problem: Skin/Tissue Integrity  Goal: Skin integrity remains intact  Description: 1.  Monitor for areas of redness and/or skin breakdown  2.  Assess vascular access sites hourly  3.  Every 4-6 hours minimum:  Change oxygen saturation probe site  4.  Every 4-6 hours:  If on nasal continuous positive airway pressure, respiratory therapy assess nares and determine need for appliance change or resting period  6/24/2025 2013 by Geremias Phillips RN  Outcome: Progressing  Flowsheets (Taken 6/24/2025 2000)  Skin Integrity Remains Intact:   Monitor for areas of redness and/or skin breakdown   Every 4-6 hours minimum:  Change oxygen saturation probe site   Assess vascular access sites hourly   Every 4-6 hours:  If on nasal continuous positive airway pressure, assess nares and determine need for appliance change or resting period  6/24/2025 1026 by Kiana Larios RN  Outcome: Progressing     Problem: Safety - Adult  Goal: Free from fall injury  6/24/2025 2013 by Geremias Phillips RN  Outcome: Progressing  6/24/2025 1026 by

## 2025-06-25 NOTE — DISCHARGE INSTR - COC
Continuity of Care Form    Patient Name: Vannesa Dasilva   :  1985  MRN:  [id number]    Admit date:  2025  Discharge date:  2025      Code Status Order: Full Code   Advance Directives:     Admitting Physician:  Michael Syed MD  PCP: Kasandra Gaona MD    Discharging Nurse: erna  Discharging Hospital Unit/Room#: 0539/0539-01  Discharging Unit Phone Number: [phone]    Emergency Contact:   Extended Emergency Contact Information  Primary Emergency Contact: Concepcion *[email or web address]  Address: [address] [place] [address]  Home Phone: [phone]  Mobile Phone: [phone]  Relation: Other    Past Surgical History:  Past Surgical History:   Procedure Laterality Date    TUBAL LIGATION         Immunization History:   Immunization History   Administered Date(s) Administered    COVID-19, PFIZER PURPLE top, DILUTE for use, (age 12 y+), 30mcg/0.3mL 2021, 2021    Influenza, FLUARIX, FLULAVAL, FLUZONE (age 6 mo+) and AFLURIA, (age 3 y+), Quadv PF, 0.5mL 2020    Influenza, FLUCELVAX, (age 6 mo+), MDCK, Quadv MDV, 0.5mL 2021    Pneumococcal, PPSV23, PNEUMOVAX 23, (age 2y+), SC/IM, 0.5mL 2020       Active Problems:  Patient Active Problem List   Diagnosis Code    Closed comminuted fracture of waist of scaphoid bone of right wrist S62.021A    Right wrist pain M25.531    Anemia D64.9    Opioid use, unspecified with other opioid-induced disorder (HCC) F11.988    Morbid obesity with BMI of 60.0-69.9, adult (HCC) E66.01, Z68.44    History of heroin use F11.91    Symptomatic anemia D64.9    Shortness of breath R06.02    Left leg swelling M79.89    Non-traumatic rhabdomyolysis M62.82    Iron deficiency anemia secondary to inadequate dietary iron intake D50.8    Iron malabsorption K90.9    Facial swelling R22.0    Swelling of right side of face R22.0    Recurrent major depressive disorder, in full remission F33.42    Sepsis (HCC) A41.9

## 2025-06-25 NOTE — PROGRESS NOTES
THE ABDOMEN AND PELVIS WITH CONTRAST 6/17/2025 1:24 am TECHNIQUE: CTA of the chest was performed after the administration of intravenous contrast.  Multiplanar reformatted images are provided for review.  MIP images are provided for review. Automated exposure control, iterative reconstruction, and/or weight based adjustment of the mA/kV was utilized to reduce the radiation dose to as low as reasonably achievable.; CT of the abdomen and pelvis was performed with the administration of intravenous contrast. Multiplanar reformatted images are provided for review. Automated exposure control, iterative reconstruction, and/or weight based adjustment of the mA/kV was utilized to reduce the radiation dose to as low as reasonably achievable. COMPARISON: CT abdomen pelvis 08/21/2022. HISTORY: ORDERING SYSTEM PROVIDED HISTORY: sob TECHNOLOGIST PROVIDED HISTORY: sob Additional Contrast?->1 Reason for Exam: SOB; ORDERING SYSTEM PROVIDED HISTORY: sob, sepsis TECHNOLOGIST PROVIDED HISTORY: sob, sepsis Reason for Exam: sob sepsis FINDINGS: CHEST CT: Pulmonary Arteries: 2 imaging attempts noted.  There is poor opacification of the pulmonary arteries limiting lobar and segmental branch evaluation.  No evidence for large central embolism. Mediastinum: No evidence of mediastinal lymphadenopathy.  The heart and pericardium demonstrate no acute abnormality.  There is no acute abnormality of the thoracic aorta. Lungs/pleura: Minimal subsegmental atelectasis or scarring in the medial lung bases.  No focal consolidation or pulmonary edema.  No evidence of pleural effusion or pneumothorax. Soft Tissues/Bones: No acute bone or soft tissue abnormality. ABDOMEN PELVIS: Organs: Mild hepatosplenomegaly measuring 20 and 14 cm respectively.  Mild hepatic steatosis.  The gallbladder, pancreas, adrenals and kidneys reveal no acute findings. GI/Bowel: There is no bowel dilatation or wall thickening identified.  Mild amount of liquid stool burden in the  06/24/25  0350 06/25/25  0703   * 132*   K 4.2 3.9    99   CO2 20 22   BUN 8 8   CREATININE 0.5* 0.5*     Hepatic Function Panel:   Recent Labs     06/24/25  0350 06/25/25  0703   BILITOT 0.6 0.7   ALKPHOS 117* 112*   * 115*   * 93*     No results found for: \"VANCOTROUGH\"     Medications:      amoxicillin  500 mg Oral 3 times per day    potassium bicarb-citric acid  40 mEq Oral Once    mineral oil-hydrophilic petrolatum   Topical BID    sodium chloride flush  5-40 mL IntraVENous 2 times per day    QUEtiapine  200 mg Oral Nightly    sertraline  100 mg Oral Daily    traZODone  50 mg Oral Nightly    heparin (porcine)  5,000 Units SubCUTAneous 3 times per day       Thank you for allowing us to participate in the care of this patient. Please call with questions.    MIKE Camara  Pager: [phone]  - Office: [phone]

## 2025-06-26 LAB
ALBUMIN SERPL-MCNC: 2.2 G/DL (ref 3.5–5.2)
ALBUMIN/GLOB SERPL: 0.5 {RATIO} (ref 1–2.5)
ALP SERPL-CCNC: 82 U/L (ref 35–104)
ALT SERPL-CCNC: 96 U/L (ref 10–35)
ANION GAP SERPL CALCULATED.3IONS-SCNC: 12 MMOL/L (ref 9–16)
AST SERPL-CCNC: 74 U/L (ref 10–35)
BASOPHILS # BLD: 0 K/UL (ref 0–0.2)
BASOPHILS NFR BLD: 0 % (ref 0–2)
BILIRUB SERPL-MCNC: 0.6 MG/DL (ref 0–1.2)
BUN SERPL-MCNC: 9 MG/DL (ref 6–20)
CALCIUM SERPL-MCNC: 8.3 MG/DL (ref 8.6–10.4)
CHLORIDE SERPL-SCNC: 100 MMOL/L (ref 98–107)
CO2 SERPL-SCNC: 21 MMOL/L (ref 20–31)
CREAT SERPL-MCNC: 0.4 MG/DL (ref 0.6–0.9)
EDDP, EDDP: 15 NG/ML
EOSINOPHIL # BLD: 0.08 K/UL (ref 0–0.44)
EOSINOPHILS RELATIVE PERCENT: 1 % (ref 1–4)
ERYTHROCYTE [DISTWIDTH] IN BLOOD BY AUTOMATED COUNT: 29.6 % (ref 11.8–14.4)
GFR, ESTIMATED: >90 ML/MIN/1.73M2
GLUCOSE SERPL-MCNC: 87 MG/DL (ref 74–99)
HCT VFR BLD AUTO: 27.7 % (ref 36.3–47.1)
HGB BLD-MCNC: 7.4 G/DL (ref 11.9–15.1)
IMM GRANULOCYTES # BLD AUTO: 0.17 K/UL (ref 0–0.3)
IMM GRANULOCYTES NFR BLD: 2 %
LYMPHOCYTES NFR BLD: 1.09 K/UL (ref 1.1–3.7)
LYMPHOCYTES RELATIVE PERCENT: 13 % (ref 24–43)
MCH RBC QN AUTO: 20.2 PG (ref 25.2–33.5)
MCHC RBC AUTO-ENTMCNC: 26.7 G/DL (ref 28.4–34.8)
MCV RBC AUTO: 75.7 FL (ref 82.6–102.9)
METHADONE: 306 NG/ML
MONOCYTES NFR BLD: 0.42 K/UL (ref 0.1–1.2)
MONOCYTES NFR BLD: 5 % (ref 3–12)
MORPHOLOGY: ABNORMAL
NEUTROPHILS NFR BLD: 79 % (ref 36–65)
NEUTS SEG NFR BLD: 6.64 K/UL (ref 1.5–8.1)
NRBC BLD-RTO: 0 PER 100 WBC
PLATELET # BLD AUTO: 214 K/UL (ref 138–453)
PMV BLD AUTO: 9.3 FL (ref 8.1–13.5)
POTASSIUM SERPL-SCNC: 4.3 MMOL/L (ref 3.7–5.3)
PROT SERPL-MCNC: 6.8 G/DL (ref 6.6–8.7)
RBC # BLD AUTO: 3.66 M/UL (ref 3.95–5.11)
SODIUM SERPL-SCNC: 133 MMOL/L (ref 136–145)
WBC OTHER # BLD: 8.4 K/UL (ref 3.5–11.3)

## 2025-06-26 PROCEDURE — 36415 COLL VENOUS BLD VENIPUNCTURE: CPT

## 2025-06-26 PROCEDURE — APPSS30 APP SPLIT SHARED TIME 16-30 MINUTES: Performed by: NURSE PRACTITIONER

## 2025-06-26 PROCEDURE — 85025 COMPLETE CBC W/AUTO DIFF WBC: CPT

## 2025-06-26 PROCEDURE — 2580000003 HC RX 258

## 2025-06-26 PROCEDURE — 2060000000 HC ICU INTERMEDIATE R&B

## 2025-06-26 PROCEDURE — 99233 SBSQ HOSP IP/OBS HIGH 50: CPT | Performed by: INTERNAL MEDICINE

## 2025-06-26 PROCEDURE — 6360000002 HC RX W HCPCS

## 2025-06-26 PROCEDURE — 6370000000 HC RX 637 (ALT 250 FOR IP): Performed by: INTERNAL MEDICINE

## 2025-06-26 PROCEDURE — 99232 SBSQ HOSP IP/OBS MODERATE 35: CPT | Performed by: INTERNAL MEDICINE

## 2025-06-26 PROCEDURE — 6370000000 HC RX 637 (ALT 250 FOR IP)

## 2025-06-26 PROCEDURE — 6370000000 HC RX 637 (ALT 250 FOR IP): Performed by: HOSPITALIST

## 2025-06-26 PROCEDURE — 2500000003 HC RX 250 WO HCPCS

## 2025-06-26 PROCEDURE — 80053 COMPREHEN METABOLIC PANEL: CPT

## 2025-06-26 PROCEDURE — 94760 N-INVAS EAR/PLS OXIMETRY 1: CPT

## 2025-06-26 RX ORDER — QUETIAPINE FUMARATE 25 MG/1
50 TABLET, FILM COATED ORAL NIGHTLY
Status: DISCONTINUED | OUTPATIENT
Start: 2025-06-26 | End: 2025-06-28 | Stop reason: HOSPADM

## 2025-06-26 RX ORDER — QUETIAPINE FUMARATE 50 MG/1
50 TABLET, FILM COATED ORAL NIGHTLY
Qty: 60 TABLET | Refills: 3 | Status: SHIPPED | OUTPATIENT
Start: 2025-06-26

## 2025-06-26 RX ORDER — FERROUS SULFATE 325(65) MG
325 TABLET, DELAYED RELEASE (ENTERIC COATED) ORAL
Status: DISCONTINUED | OUTPATIENT
Start: 2025-06-29 | End: 2025-06-28 | Stop reason: HOSPADM

## 2025-06-26 RX ORDER — DOXYCYCLINE HYCLATE 100 MG
100 TABLET ORAL 2 TIMES DAILY
Qty: 20 TABLET | Refills: 0 | Status: SHIPPED | OUTPATIENT
Start: 2025-07-04 | End: 2025-07-14

## 2025-06-26 RX ADMIN — HEPARIN SODIUM 5000 UNITS: 5000 INJECTION INTRAVENOUS; SUBCUTANEOUS at 12:57

## 2025-06-26 RX ADMIN — HEPARIN SODIUM 5000 UNITS: 5000 INJECTION INTRAVENOUS; SUBCUTANEOUS at 06:04

## 2025-06-26 RX ADMIN — SERTRALINE HYDROCHLORIDE 100 MG: 50 TABLET ORAL at 08:05

## 2025-06-26 RX ADMIN — IRON SUCROSE 300 MG: 20 INJECTION, SOLUTION INTRAVENOUS at 11:54

## 2025-06-26 RX ADMIN — QUETIAPINE FUMARATE 50 MG: 25 TABLET ORAL at 21:21

## 2025-06-26 RX ADMIN — SODIUM CHLORIDE, PRESERVATIVE FREE 10 ML: 5 INJECTION INTRAVENOUS at 21:22

## 2025-06-26 RX ADMIN — TRAZODONE HYDROCHLORIDE 50 MG: 50 TABLET ORAL at 21:21

## 2025-06-26 RX ADMIN — WHITE PETROLATUM: 1.75 OINTMENT TOPICAL at 08:05

## 2025-06-26 RX ADMIN — AMOXICILLIN 500 MG: 500 CAPSULE ORAL at 12:57

## 2025-06-26 RX ADMIN — SODIUM CHLORIDE, PRESERVATIVE FREE 10 ML: 5 INJECTION INTRAVENOUS at 08:05

## 2025-06-26 RX ADMIN — FENTANYL CITRATE 25 MCG: 50 INJECTION INTRAMUSCULAR; INTRAVENOUS at 23:02

## 2025-06-26 RX ADMIN — AMOXICILLIN 500 MG: 500 CAPSULE ORAL at 06:04

## 2025-06-26 RX ADMIN — HEPARIN SODIUM 5000 UNITS: 5000 INJECTION INTRAVENOUS; SUBCUTANEOUS at 21:21

## 2025-06-26 RX ADMIN — AMOXICILLIN 500 MG: 500 CAPSULE ORAL at 21:22

## 2025-06-26 RX ADMIN — WHITE PETROLATUM: 1.75 OINTMENT TOPICAL at 21:22

## 2025-06-26 RX ADMIN — OXYCODONE HYDROCHLORIDE AND ACETAMINOPHEN 1 TABLET: 5; 325 TABLET ORAL at 21:21

## 2025-06-26 ASSESSMENT — PAIN SCALES - GENERAL
PAINLEVEL_OUTOF10: 8
PAINLEVEL_OUTOF10: 6
PAINLEVEL_OUTOF10: 5
PAINLEVEL_OUTOF10: 8

## 2025-06-26 NOTE — PROGRESS NOTES
[facility]  Internal Medicine Teaching Residency Program  Inpatient Daily Progress Note  ______________________________________________________________________________    Patient: Vannesa Dasilva  YOB: 1985   MRN:[id number]    Acct: [id number]     Room: 0539/0539-01  Admit date: 6/16/2025  Today's date: 06/26/25  Number of days in the hospital: 10    SUBJECTIVE   Admitting Diagnosis: Sepsis (HCC)  CC: Left leg pain and swelling    Pt examined at bedside. Chart & results reviewed.   No acute events overnight  Likely discharge today if precert is accepted  Start IV venofer for iron deficiency   Pain has lessened since discharge       BRIEF HISTORY     The patient is a pleasant 39 y.o. female with a PMHx significant for      Bilateral lower extremity lymphadenopathy  Anxiety/depression  Chronic iron deficiency anemia  Obesity hypoventilation syndrome     presents with a chief complaint of left leg pain and swelling.  According to the patient she was incarcerated yesterday and she states that she was in custodial and she fell down and was left in same position for more than 2 hours.  After that she was having problem while walking.    Patient does have bilateral lower extremity nonpitting edema.  Patient denies losing consciousness or hitting her head when she fell.  Patient is not on any anticoagulation.  She denies chest pain, shortness of breath, fever, chills, rigors, cough, congestion, abdominal pain, nausea, vomiting, diarrhea, constipation.     On arrival to ED, patient was very tachycardic heart rate 140s and blood pressure 130/62.  She does have 1 episodes of fever maximum temperature recorded 101.  Initial lab was concerning for leukocytosis WBC 14.1, hemoglobin 7.2, platelets 192.  Hyponatremia sodium 129, potassium 2.7, creatinine 1.3, lactic acid 7.7, procalcitonin 4.36, mildly elevated proBNP 681, troponin 19.  X-ray of left foot unremarkable.  Also  suspicion for acute embolism, consider V/Q scan or repeat CT imaging. 2. No acute airspace disease identified. 3. No acute inflammatory process identified in the abdomen or pelvis. 4. Mild hepatosplenomegaly. Mild hepatic steatosis.     CT ABDOMEN PELVIS W IV CONTRAST Additional Contrast? None  Result Date: 6/17/2025  1. Limited evaluation of the pulmonary arteries due to poor opacification. No evidence for large central embolism.  If there remains high clinical suspicion for acute embolism, consider V/Q scan or repeat CT imaging. 2. No acute airspace disease identified. 3. No acute inflammatory process identified in the abdomen or pelvis. 4. Mild hepatosplenomegaly. Mild hepatic steatosis.     XR CHEST PORTABLE  Result Date: 6/16/2025  No acute cardiopulmonary findings     XR KNEE LEFT (3 VIEWS)  Result Date: 6/16/2025  No acute bony or joint abnormality     XR TIBIA FIBULA LEFT (2 VIEWS)  Result Date: 6/16/2025  No acute bony or joint abnormality       ASSESSMENT & PLAN     Assessment and Plan:    Principal Problem:    Sepsis (HCC)  Active Problems:    Anemia    History of heroin use    Shortness of breath    Left leg swelling    Electrolyte imbalance    Elevated lactic acid level    Anxiety    Depression    Chronic acquired lymphedema    HOMA (acute kidney injury)    Hypokalemia    Sepsis due to Streptococcus pyogenes (HCC)    Traumatic rhabdomyolysis    Nausea and vomiting    Pain of left calf  Resolved Problems:    * No resolved hospital problems. *    #Left Leg cellulitis   # Streptococcus pyogenes bacteremia  - Presented with left leg pain and swelling, tachycardic and had an episode of 101 fever  - Elevated lactic acid up to 7.7, downtrending  -Blood culture 6/16/2025-2/2 positive-gram-positive cocci in pairs (strep pyogenes)  - CRP - 321 - 114  - CT PE-negative  - MRI of left lower extremity concerning for tibial osteomyelitis and severe cellulitis.  Ankle MRI no concern for osteomyelitis.    - good pulses

## 2025-06-26 NOTE — CARE COORDINATION
Case Management   Daily Progress Note       Patient Name: Vannesa Dasilva                   YOB: 1985  Diagnosis: Shortness of breath [R06.02]  Hypokalemia [E87.6]  HOMA (acute kidney injury) [N17.9]  Sepsis (HCC) [A41.9]  Anemia, unspecified type [D64.9]  Sepsis, due to unspecified organism, unspecified whether acute organ dysfunction present (HCC) [A41.9]                       GMLOS: 3.5 days  Length of Stay: 10  days    Anticipated Discharge Date: One day until discharge    Readmission Risk (Low < 19, Mod (19-27), High > 27): Readmission Risk Score: 17.3        Current Transitional Plan    [] Home Independently    [] Home with HC    [x] Skilled Nursing Facility    [] Acute Rehabilitation    [] Long Term Acute Care (LTAC)    [] Other:     Plan for the Stay (Medical Management) :          Workflow Continuation (Additional Notes) : Received a message from Rachel Smith, states the auth has been submitted.        MOISES STYLES  June 26, 2025

## 2025-06-26 NOTE — PLAN OF CARE
Problem: Discharge Planning  Goal: Discharge to home or other facility with appropriate resources  6/25/2025 2028 by Geremias Phillips RN  Outcome: Progressing  Flowsheets (Taken 6/25/2025 2000)  Discharge to home or other facility with appropriate resources:   Identify barriers to discharge with patient and caregiver   Identify discharge learning needs (meds, wound care, etc)   Arrange for needed discharge resources and transportation as appropriate  6/25/2025 1717 by Jessica Chavarria RN  Outcome: Progressing     Problem: Pain  Goal: Verbalizes/displays adequate comfort level or baseline comfort level  6/25/2025 2028 by Geremias Phillips RN  Outcome: Progressing  6/25/2025 1717 by Jessica Chavarria RN  Outcome: Progressing     Problem: Skin/Tissue Integrity  Goal: Skin integrity remains intact  Description: 1.  Monitor for areas of redness and/or skin breakdown  2.  Assess vascular access sites hourly  3.  Every 4-6 hours minimum:  Change oxygen saturation probe site  4.  Every 4-6 hours:  If on nasal continuous positive airway pressure, respiratory therapy assess nares and determine need for appliance change or resting period  6/25/2025 2028 by Geremias Phillips RN  Outcome: Progressing  Flowsheets (Taken 6/25/2025 2000)  Skin Integrity Remains Intact:   Monitor for areas of redness and/or skin breakdown   Every 4-6 hours minimum:  Change oxygen saturation probe site   Assess vascular access sites hourly  6/25/2025 1717 by Jessica Chavarria RN  Outcome: Progressing     Problem: Safety - Adult  Goal: Free from fall injury  6/25/2025 2028 by Geremias Phillips RN  Outcome: Progressing  6/25/2025 1717 by Jessica Chavarria RN  Outcome: Progressing     Problem: Chronic Conditions and Co-morbidities  Goal: Patient's chronic conditions and co-morbidity symptoms are monitored and maintained or improved  6/25/2025 2028 by Geremias Phillips RN  Outcome: Progressing  Flowsheets (Taken 6/25/2025 2000)  Care Plan - Patient's Chronic Conditions and

## 2025-06-26 NOTE — PROGRESS NOTES
.[facility] - Progress Note    Today's Date and Time: 6/26/2025, 3:04 PM    Impression :   Fall at group home on 6/17/25  Down time 2 hours  Group A Strep (pyogenes) septicemia 2/2 on 6/16/25  Concern for LLE cellulitis  Low-grade fever  BLE edema  Rhabdomyolysis  HOMA  Hyponatremia  Hypokalemia  Lactic acidosis  Leukocytosis  Anemia  Elevated liver enzymes  Emesis  CHF  Obesity  Anxiety  Depression  Hx of heroin abuse-Patient denied IV use    Recommendations:   D/C IV ceftriaxone for strep pyogenes septicemia  Plan to Complete treatment with po Amoxicillin x 10 more days. Stop date 7-4-25  Continue dry dressings and ACE wraps  Transfer plans to [place], [place] in progress    Discontinued vancomycin and Zosyn on 6/17/25   Discontinued ampicillin on 6-19-25 to reduce fluid and Na loads as patient is on high volume fluids because of rhabdomyolysis    Medical Decision Making/Summary/Discussion:6/26/2025         Elements of Medical Decision Making:  Note: I have independently performed the steps listed below as part of the medical decision making and evaluation.   Examined and discussed with patient.  Severe Lt leg cellulitis  Strep pyogenes septicemia 6-16-25  Rhabdomyolysis  HOMA  Lactic acidosis  Somnolence  CHF  Obesity  Prior Hx of heroin use. Currently patient denies any use  Labs, medications, radiologic studies were reviewed with personal review of films  Radiologic studies  Lab work  Cultures  Blood: Strep pyogenes  Large amounts of data were reviewed  Please see the history of present illness and progress note  Patients with Strep pyogenes in blood usually have a difficult clinical course  Will plan IV ampicillin and if necessary add gentamicin for synergy  Patient currently somnolent but wakes up and answers appropriately. No signs of meningitis noted  Discussed with nursing Staff, Discharge planner  `Dr Syed  Infection Control and Prevention measures reviewed  Universal  pelvis. 4. Mild hepatosplenomegaly. Mild hepatic steatosis.     CT ABDOMEN PELVIS W IV CONTRAST Additional Contrast? None  Result Date: 6/17/2025  EXAMINATION: CTA OF THE CHEST; CT OF THE ABDOMEN AND PELVIS WITH CONTRAST 6/17/2025 1:24 am TECHNIQUE: CTA of the chest was performed after the administration of intravenous contrast.  Multiplanar reformatted images are provided for review.  MIP images are provided for review. Automated exposure control, iterative reconstruction, and/or weight based adjustment of the mA/kV was utilized to reduce the radiation dose to as low as reasonably achievable.; CT of the abdomen and pelvis was performed with the administration of intravenous contrast. Multiplanar reformatted images are provided for review. Automated exposure control, iterative reconstruction, and/or weight based adjustment of the mA/kV was utilized to reduce the radiation dose to as low as reasonably achievable. COMPARISON: CT abdomen pelvis 08/21/2022. HISTORY: ORDERING SYSTEM PROVIDED HISTORY: sob TECHNOLOGIST PROVIDED HISTORY: sob Additional Contrast?->1 Reason for Exam: SOB; ORDERING SYSTEM PROVIDED HISTORY: sob, sepsis TECHNOLOGIST PROVIDED HISTORY: sob, sepsis Reason for Exam: sob sepsis FINDINGS: CHEST CT: Pulmonary Arteries: 2 imaging attempts noted.  There is poor opacification of the pulmonary arteries limiting lobar and segmental branch evaluation.  No evidence for large central embolism. Mediastinum: No evidence of mediastinal lymphadenopathy.  The heart and pericardium demonstrate no acute abnormality.  There is no acute abnormality of the thoracic aorta. Lungs/pleura: Minimal subsegmental atelectasis or scarring in the medial lung bases.  No focal consolidation or pulmonary edema.  No evidence of pleural effusion or pneumothorax. Soft Tissues/Bones: No acute bone or soft tissue abnormality. ABDOMEN PELVIS: Organs: Mild hepatosplenomegaly measuring 20 and 14 cm respectively.  Mild hepatic steatosis.

## 2025-06-27 ENCOUNTER — APPOINTMENT (OUTPATIENT)
Dept: GENERAL RADIOLOGY | Age: 40
End: 2025-06-27
Payer: MEDICAID

## 2025-06-27 LAB
ALBUMIN SERPL-MCNC: 2.3 G/DL (ref 3.5–5.2)
ALBUMIN/GLOB SERPL: 0.5 {RATIO} (ref 1–2.5)
ALP SERPL-CCNC: 82 U/L (ref 35–104)
ALT SERPL-CCNC: 81 U/L (ref 10–35)
ANION GAP SERPL CALCULATED.3IONS-SCNC: 13 MMOL/L (ref 9–16)
AST SERPL-CCNC: 65 U/L (ref 10–35)
BASOPHILS # BLD: 0 K/UL (ref 0–0.2)
BASOPHILS NFR BLD: 0 % (ref 0–2)
BILIRUB SERPL-MCNC: 0.6 MG/DL (ref 0–1.2)
BUN SERPL-MCNC: 9 MG/DL (ref 6–20)
CALCIUM SERPL-MCNC: 8.3 MG/DL (ref 8.6–10.4)
CHLORIDE SERPL-SCNC: 99 MMOL/L (ref 98–107)
CO2 SERPL-SCNC: 20 MMOL/L (ref 20–31)
CREAT SERPL-MCNC: 0.4 MG/DL (ref 0.6–0.9)
EOSINOPHIL # BLD: 0.09 K/UL (ref 0–0.44)
EOSINOPHILS RELATIVE PERCENT: 1 % (ref 1–4)
ERYTHROCYTE [DISTWIDTH] IN BLOOD BY AUTOMATED COUNT: 29.4 % (ref 11.8–14.4)
GFR, ESTIMATED: >90 ML/MIN/1.73M2
GLUCOSE BLD-MCNC: 102 MG/DL (ref 65–105)
GLUCOSE SERPL-MCNC: 100 MG/DL (ref 74–99)
HCT VFR BLD AUTO: 27.6 % (ref 36.3–47.1)
HGB BLD-MCNC: 7.5 G/DL (ref 11.9–15.1)
IMM GRANULOCYTES # BLD AUTO: 0.17 K/UL (ref 0–0.3)
IMM GRANULOCYTES NFR BLD: 2 %
LYMPHOCYTES NFR BLD: 1.04 K/UL (ref 1.1–3.7)
LYMPHOCYTES RELATIVE PERCENT: 12 % (ref 24–43)
MCH RBC QN AUTO: 20.7 PG (ref 25.2–33.5)
MCHC RBC AUTO-ENTMCNC: 27.2 G/DL (ref 28.4–34.8)
MCV RBC AUTO: 76.2 FL (ref 82.6–102.9)
MONOCYTES NFR BLD: 0.44 K/UL (ref 0.1–1.2)
MONOCYTES NFR BLD: 5 % (ref 3–12)
MORPHOLOGY: ABNORMAL
NEUTROPHILS NFR BLD: 80 % (ref 36–65)
NEUTS SEG NFR BLD: 6.96 K/UL (ref 1.5–8.1)
NRBC BLD-RTO: 0 PER 100 WBC
PLATELET # BLD AUTO: 257 K/UL (ref 138–453)
PMV BLD AUTO: 9.8 FL (ref 8.1–13.5)
POTASSIUM SERPL-SCNC: 3.7 MMOL/L (ref 3.7–5.3)
PROT SERPL-MCNC: 6.9 G/DL (ref 6.6–8.7)
RBC # BLD AUTO: 3.62 M/UL (ref 3.95–5.11)
SODIUM SERPL-SCNC: 132 MMOL/L (ref 136–145)
WBC OTHER # BLD: 8.7 K/UL (ref 3.5–11.3)

## 2025-06-27 PROCEDURE — 2580000003 HC RX 258

## 2025-06-27 PROCEDURE — 97530 THERAPEUTIC ACTIVITIES: CPT

## 2025-06-27 PROCEDURE — 74018 RADEX ABDOMEN 1 VIEW: CPT

## 2025-06-27 PROCEDURE — 97535 SELF CARE MNGMENT TRAINING: CPT

## 2025-06-27 PROCEDURE — APPSS30 APP SPLIT SHARED TIME 16-30 MINUTES: Performed by: NURSE PRACTITIONER

## 2025-06-27 PROCEDURE — 6370000000 HC RX 637 (ALT 250 FOR IP)

## 2025-06-27 PROCEDURE — 85025 COMPLETE CBC W/AUTO DIFF WBC: CPT

## 2025-06-27 PROCEDURE — 82947 ASSAY GLUCOSE BLOOD QUANT: CPT

## 2025-06-27 PROCEDURE — 80053 COMPREHEN METABOLIC PANEL: CPT

## 2025-06-27 PROCEDURE — 97116 GAIT TRAINING THERAPY: CPT

## 2025-06-27 PROCEDURE — 99233 SBSQ HOSP IP/OBS HIGH 50: CPT | Performed by: INTERNAL MEDICINE

## 2025-06-27 PROCEDURE — 99232 SBSQ HOSP IP/OBS MODERATE 35: CPT | Performed by: INTERNAL MEDICINE

## 2025-06-27 PROCEDURE — 2060000000 HC ICU INTERMEDIATE R&B

## 2025-06-27 PROCEDURE — 6360000002 HC RX W HCPCS

## 2025-06-27 PROCEDURE — 6370000000 HC RX 637 (ALT 250 FOR IP): Performed by: HOSPITALIST

## 2025-06-27 PROCEDURE — 36415 COLL VENOUS BLD VENIPUNCTURE: CPT

## 2025-06-27 PROCEDURE — 97110 THERAPEUTIC EXERCISES: CPT

## 2025-06-27 PROCEDURE — 2500000003 HC RX 250 WO HCPCS

## 2025-06-27 PROCEDURE — 6370000000 HC RX 637 (ALT 250 FOR IP): Performed by: INTERNAL MEDICINE

## 2025-06-27 RX ORDER — PANTOPRAZOLE SODIUM 40 MG/1
40 TABLET, DELAYED RELEASE ORAL
Status: DISCONTINUED | OUTPATIENT
Start: 2025-06-28 | End: 2025-06-28 | Stop reason: HOSPADM

## 2025-06-27 RX ORDER — AMOXICILLIN 500 MG/1
500 CAPSULE ORAL EVERY 8 HOURS SCHEDULED
DISCHARGE
Start: 2025-06-27 | End: 2025-07-04

## 2025-06-27 RX ORDER — SODIUM CHLORIDE, SODIUM LACTATE, POTASSIUM CHLORIDE, CALCIUM CHLORIDE 600; 310; 30; 20 MG/100ML; MG/100ML; MG/100ML; MG/100ML
INJECTION, SOLUTION INTRAVENOUS CONTINUOUS
Status: ACTIVE | OUTPATIENT
Start: 2025-06-27 | End: 2025-06-28

## 2025-06-27 RX ORDER — CALCIUM CARBONATE 500 MG/1
500 TABLET, CHEWABLE ORAL 3 TIMES DAILY PRN
Status: DISCONTINUED | OUTPATIENT
Start: 2025-06-27 | End: 2025-06-28 | Stop reason: HOSPADM

## 2025-06-27 RX ORDER — SENNOSIDES 8.6 MG/1
1 TABLET ORAL NIGHTLY
Status: DISCONTINUED | OUTPATIENT
Start: 2025-06-27 | End: 2025-06-28 | Stop reason: HOSPADM

## 2025-06-27 RX ADMIN — AMOXICILLIN 500 MG: 500 CAPSULE ORAL at 21:04

## 2025-06-27 RX ADMIN — SENNOSIDES 8.6 MG: 8.6 TABLET, FILM COATED ORAL at 21:03

## 2025-06-27 RX ADMIN — SODIUM CHLORIDE, SODIUM LACTATE, POTASSIUM CHLORIDE, AND CALCIUM CHLORIDE 75 ML/HR: .6; .31; .03; .02 INJECTION, SOLUTION INTRAVENOUS at 16:26

## 2025-06-27 RX ADMIN — WHITE PETROLATUM: 1.75 OINTMENT TOPICAL at 08:43

## 2025-06-27 RX ADMIN — AMOXICILLIN 500 MG: 500 CAPSULE ORAL at 16:26

## 2025-06-27 RX ADMIN — SERTRALINE HYDROCHLORIDE 100 MG: 50 TABLET ORAL at 08:42

## 2025-06-27 RX ADMIN — ANTACID TABLETS 500 MG: 500 TABLET, CHEWABLE ORAL at 12:15

## 2025-06-27 RX ADMIN — HEPARIN SODIUM 5000 UNITS: 5000 INJECTION INTRAVENOUS; SUBCUTANEOUS at 06:10

## 2025-06-27 RX ADMIN — OXYCODONE HYDROCHLORIDE AND ACETAMINOPHEN 1 TABLET: 5; 325 TABLET ORAL at 21:03

## 2025-06-27 RX ADMIN — WHITE PETROLATUM: 1.75 OINTMENT TOPICAL at 21:08

## 2025-06-27 RX ADMIN — TRAZODONE HYDROCHLORIDE 50 MG: 50 TABLET ORAL at 21:03

## 2025-06-27 RX ADMIN — FENTANYL CITRATE 25 MCG: 50 INJECTION INTRAMUSCULAR; INTRAVENOUS at 08:42

## 2025-06-27 RX ADMIN — HEPARIN SODIUM 5000 UNITS: 5000 INJECTION INTRAVENOUS; SUBCUTANEOUS at 21:04

## 2025-06-27 RX ADMIN — AMOXICILLIN 500 MG: 500 CAPSULE ORAL at 06:10

## 2025-06-27 RX ADMIN — QUETIAPINE FUMARATE 50 MG: 25 TABLET ORAL at 21:03

## 2025-06-27 RX ADMIN — SODIUM CHLORIDE, PRESERVATIVE FREE 10 ML: 5 INJECTION INTRAVENOUS at 08:43

## 2025-06-27 RX ADMIN — IRON SUCROSE 300 MG: 20 INJECTION, SOLUTION INTRAVENOUS at 11:06

## 2025-06-27 ASSESSMENT — PAIN SCALES - GENERAL
PAINLEVEL_OUTOF10: 6
PAINLEVEL_OUTOF10: 9
PAINLEVEL_OUTOF10: 8
PAINLEVEL_OUTOF10: 6
PAINLEVEL_OUTOF10: 7

## 2025-06-27 ASSESSMENT — PAIN DESCRIPTION - LOCATION
LOCATION: LEG
LOCATION: ABDOMEN

## 2025-06-27 ASSESSMENT — PAIN DESCRIPTION - DESCRIPTORS: DESCRIPTORS: ACHING;DISCOMFORT;THROBBING

## 2025-06-27 ASSESSMENT — PAIN DESCRIPTION - ORIENTATION: ORIENTATION: LEFT

## 2025-06-27 NOTE — PROGRESS NOTES
Physical Therapy  Facility/Department: [address] STEPDOWN   Physical Therapy Daily Treatment Note    Patient Name: Vannesa Dasilva        MRN: [id number]    : 1985    Date of Service: 2025    Chief Complaint   Patient presents with    Leg Swelling     Past Medical History:  has a past medical history of History of heroin use.  Past Surgical History:  has a past surgical history that includes Tubal ligation.    Discharge Recommendations  Discharge Recommendations: Patient would benefit from continued therapy after discharge  PT Equipment Recommendations  Equipment Needed: No  Other: pt reported owning rollator    Assessment  Body Structures, Functions, Activity Limitations Requiring Skilled Therapeutic Intervention: Decreased functional mobility ;Decreased ADL status;Decreased endurance;Decreased safe awareness;Decreased strength;Decreased posture;Decreased balance;Increased pain  Assessment: Pt required maxAx2 to progress trunk and BLE during bed mobility. Pt completed STS transfers with maxA x2, progressing to modA x2. Pt amb 4' x2 from EOB<>commode with modA x2 with RW. Pt most limited by decreased balance, LE weakness and pain. Pt is not currently at independent PLOF; would benefit from continued acute care physical therapy to progress functional mobility  Therapy Prognosis: Good  Decision Making: Medium Complexity  Activity Tolerance  Activity Tolerance: Patient limited by pain;Treatment limited secondary to medical complications;Patient limited by endurance  Activity Tolerance Comments: highest 's BPM and highest resp rate 44 breaths/min; edu on proper breathing technique and HR <100 bpm and resp rate <20  Safety Devices  Type of Devices: Call light within reach;Nurse notified;Gait belt;Patient at risk for falls;Heels elevated for pressure relief;Left in bed;Bed alarm in place;All fall risk precautions in place  Restraints  Restraints Initially in Place: No    AM-PAC  AM-PAC Basic Mobility -

## 2025-06-27 NOTE — PROGRESS NOTES
Occupational Therapy  Occupational Therapy Daily Treatment Note  Facility/Department: [address] STE[address]   Patient Name: Vannesa Dasilva        MRN: [id number]    : 1985    Date of Service: 2025    Chief Complaint   Patient presents with    Leg Swelling     Past Medical History:  has a past medical history of History of heroin use.  Past Surgical History:  has a past surgical history that includes Tubal ligation.    Discharge Recommendations  Discharge Recommendations: Patient would benefit from continued therapy after discharge       Assessment  Performance deficits / Impairments: Decreased functional mobility ;Decreased ADL status;Decreased strength;Decreased safe awareness;Decreased high-level IADLs;Decreased balance;Decreased endurance  Assessment: Pt limited by above deficits impacting pts functional mobility/ADL performance. Pt is expected to require skilled OT services to increase safety and promote increased independence t/o ADL/IADLs and functional mobility tasks.  Prognosis: Fair  Activity Tolerance  Activity Tolerance: Patient limited by fatigue;Patient limited by pain  Safety Devices  Type of Devices: Call light within reach;Nurse notified;Gait belt;Patient at risk for falls;Heels elevated for pressure relief;Left in bed;Bed alarm in place  Restraints  Restraints Initially in Place: No    AM-PAC  AM-[facility] Daily Activity - Inpatient   How much help is needed for putting on and taking off regular lower body clothing?: A Lot  How much help is needed for bathing (which includes washing, rinsing, drying)?: A Lot  How much help is needed for toileting (which includes using toilet, bedpan, or urinal)?: A Lot  How much help is needed for putting on and taking off regular upper body clothing?: A Little  How much help is needed for taking care of personal grooming?: A Little  How much help for eating meals?: None  AM-[facility] Inpatient Daily Activity Raw Score: 16  AM-PAC Inpatient ADL T-Scale Score : 35.96  ADL

## 2025-06-27 NOTE — CARE COORDINATION
Case Management   Daily Progress Note       Patient Name: Vannesa Dasilva                   YOB: 1985  Diagnosis: Shortness of breath [R06.02]  Hypokalemia [E87.6]  HOMA (acute kidney injury) [N17.9]  Sepsis (HCC) [A41.9]  Anemia, unspecified type [D64.9]  Sepsis, due to unspecified organism, unspecified whether acute organ dysfunction present (HCC) [A41.9]                       GMLOS: 3.5 days  Length of Stay: 11  days    Anticipated Discharge Date: One day until discharge    Readmission Risk (Low < 19, Mod (19-27), High > 27): Readmission Risk Score: 17.4        Current Transitional Plan    [] Home Independently    [] Home with HC    [x] Skilled Nursing Facility    [] Acute Rehabilitation    [] Long Term Acute Care (LTAC)    [] Other:     Plan for the Stay (Medical Management) :          Workflow Continuation (Additional Notes) :Requested auth status update from [facility] via [employer]     0818 Faxed transportation request to [facility] with 1600 .    0906 Received a call from Kiana with [facility]. Transportation has been scheduled for 1600 today.    1535 Received a message from Rachel, the prior auth has been approved. Per the patients nurse, the discharge has been canceled due to the patient having an ileus.    1540 Spoke with Rosalind with [facility], canceled transportation. Messaged Rachel through [employer] and notified her of the canceled discharge.    1617 Per Rachel, the auth is good through 07/09/2025.        MOISES STYLES  June 27, 2025

## 2025-06-27 NOTE — PROGRESS NOTES
.[facility] - Progress Note    Today's Date and Time: 6/27/2025, 1:41 PM    Impression :   [facility]  Down time 2 hours  Group A Strep (pyogenes) septicemia 2/2 on 6/16/25  Concern for LLE cellulitis  Low-grade fever  BLE edema  Rhabdomyolysis  HOMA  Hyponatremia  Hypokalemia  Lactic acidosis  Leukocytosis  Anemia  Elevated liver enzymes  Emesis  CHF  Obesity  Anxiety  Depression  Hx of heroin abuse-Patient denied IV use    Recommendations:   D/C IV ceftriaxone for strep pyogenes septicemia  Plan to Complete treatment with po Amoxicillin x 10 more days. Stop date 7-4-25  Continue dry dressings and ACE wraps  Transfer plans to [place], [place] in progress    Discontinued vancomycin and Zosyn on 6/17/25   Discontinued ampicillin on 6-19-25 to reduce fluid and Na loads as patient is on high volume fluids because of rhabdomyolysis    Medical Decision Making/Summary/Discussion:6/27/2025         Elements of Medical Decision Making:  Note: I have independently performed the steps listed below as part of the medical decision making and evaluation.   Examined and discussed with patient.  Severe Lt leg cellulitis  Strep pyogenes septicemia 6-16-25  Rhabdomyolysis  HOMA  Lactic acidosis  Somnolence  CHF  Obesity  Prior Hx of heroin use. Currently patient denies any use  Labs, medications, radiologic studies were reviewed with personal review of films  Radiologic studies  Lab work  Cultures  Blood: Strep pyogenes  Large amounts of data were reviewed  Please see the history of present illness and progress note  Patients with Strep pyogenes in blood usually have a difficult clinical course  Will plan IV ampicillin and if necessary add gentamicin for synergy  Patient currently somnolent but wakes up and answers appropriately. No signs of meningitis noted  Discussed with nursing Staff, Discharge planner  `Dr Syed  Infection Control and Prevention measures reviewed  Universal

## 2025-06-27 NOTE — PLAN OF CARE
Patient wants to go home instead of SNF. Denies any need for HHC. States she has a good support system and she will be able to follow with her doctors outpatient. Updated CM an RN. Ok to be discharged to home    Suzy Wallace MD  PGY-2, Internal Medicine Resident  [facility], [place]         6/27/2025, 1:22 PM

## 2025-06-27 NOTE — PROGRESS NOTES
[facility]  Internal Medicine Teaching Residency Program  Inpatient Daily Progress Note  ______________________________________________________________________________    Patient: Vannesa Dasilva  YOB: 1985   MRN:[id number]    Acct: [id number]     Room: 0539/0539-01  Admit date: 6/16/2025  Today's date: 06/27/25  Number of days in the hospital: 11    SUBJECTIVE   Admitting Diagnosis: Sepsis (HCC)  CC: Left leg pain and swelling    Pt examined at bedside. Chart & results reviewed.   No acute events overnight    Patient is hemodynamically stable with blood pressure 133/81, afebrile currently on room air, saturation of 95%       Morning labs   WBC 8.7, hemoglobin 7.5, platelet 257  Sodium 132, potassium 3.7, creatinine 0.4, BUN 9    Blood culture 6/16/2025-2 /2 positive for Streptococcus pyogenes    MRI left lower extremity-extensive cellulitis.  Probable superficial fasciitis.  Concern for osteomyelitis of proximal tibia.  MRI left ankle-extensive cellulitis.  Negative for osteomyelitis in ankle  - CT left tibia-fibula 6/20/2025-concerning for abscess    Plan  Continue amoxicillin 500 Mg every 8 hours.  Stop date 7/4/2025  -Start IV Venofer for iron deficiency  Continue dry dressing and Ace wrapping of left leg  Medically discharge pending placement    BRIEF HISTORY     The patient is a pleasant 39 y.o. female with a PMHx significant for      Bilateral lower extremity lymphadenopathy  Anxiety/depression  Chronic iron deficiency anemia  Obesity hypoventilation syndrome     presents with a chief complaint of left leg pain and swelling.  According to the patient she was incarcerated yesterday and she states that she was in penitentiary and she fell down and was left in same position for more than 2 hours.  After that she was having problem while walking.    Patient does have bilateral lower extremity nonpitting edema.  Patient denies losing consciousness or hitting her  acid  40 mEq Oral Once    mineral oil-hydrophilic petrolatum   Topical BID    sodium chloride flush  5-40 mL IntraVENous 2 times per day    sertraline  100 mg Oral Daily    traZODone  50 mg Oral Nightly    heparin (porcine)  5,000 Units SubCUTAneous 3 times per day     Continuous Infusions:    sodium chloride      sodium chloride      sodium chloride Stopped (06/18/25 0456)     PRN MedicationsoxyCODONE-acetaminophen, 1 tablet, Q6H PRN  fentanNYL, 25 mcg, Q4H PRN  albuterol, 2.5 mg, Q6H PRN  sodium chloride, , PRN  midodrine, 10 mg, TID PRN  sodium chloride, , PRN  sodium chloride flush, 5-40 mL, PRN  sodium chloride, , PRN  potassium chloride, 40 mEq, PRN   Or  potassium alternative oral replacement, 40 mEq, PRN   Or  potassium chloride, 10 mEq, PRN  magnesium sulfate, 2,000 mg, PRN  polyethylene glycol, 17 g, Daily PRN  acetaminophen, 650 mg, Q6H PRN   Or  acetaminophen, 650 mg, Q6H PRN        Diagnostic Labs:  CBC:   Recent Labs     06/25/25  0703 06/26/25  0647   WBC 9.4 8.4   RBC 3.63* 3.66*   HGB 7.3* 7.4*   HCT 26.2* 27.7*   MCV 72.2* 75.7*   RDW 28.8* 29.6*   PLT See Reflexed IPF Result 214     BMP:   Recent Labs     06/25/25  0703 06/26/25  0647   * 133*   K 3.9 4.3   CL 99 100   CO2 22 21   BUN 8 9   CREATININE 0.5* 0.4*     BNP: No results for input(s): \"BNP\" in the last 72 hours.  PT/INR:   No results for input(s): \"PROTIME\", \"INR\" in the last 72 hours.    APTT: No results for input(s): \"APTT\" in the last 72 hours.  CARDIAC ENZYMES: No results for input(s): \"CKMB\", \"CKMBINDEX\", \"TROPONINI\" in the last 72 hours.    Invalid input(s): \"CKTOTAL;3\"  FASTING LIPID PANEL:  Lab Results   Component Value Date    CHOL 62 06/17/2025    HDL 10 (L) 06/17/2025    TRIG 252 (H) 06/17/2025     LIVER PROFILE:   Recent Labs     06/25/25  0703 06/26/25  0647   AST 93* 74*   * 96*   BILITOT 0.7 0.6   ALKPHOS 112* 82      MICROBIOLOGY:   Lab Results   Component Value Date/Time    CULTURE NO GROWTH 5 DAYS

## 2025-06-27 NOTE — PLAN OF CARE
Problem: Discharge Planning  Goal: Discharge to home or other facility with appropriate resources  Outcome: Progressing     Problem: Pain  Goal: Verbalizes/displays adequate comfort level or baseline comfort level  Outcome: Progressing     Problem: Skin/Tissue Integrity  Goal: Skin integrity remains intact  Description: 1.  Monitor for areas of redness and/or skin breakdown  2.  Assess vascular access sites hourly  3.  Every 4-6 hours minimum:  Change oxygen saturation probe site  4.  Every 4-6 hours:  If on nasal continuous positive airway pressure, respiratory therapy assess nares and determine need for appliance change or resting period  Outcome: Progressing     Problem: Safety - Adult  Goal: Free from fall injury  Outcome: Progressing     Problem: Chronic Conditions and Co-morbidities  Goal: Patient's chronic conditions and co-morbidity symptoms are monitored and maintained or improved  Outcome: Progressing     Problem: Cardiovascular - Adult  Goal: Maintains optimal cardiac output and hemodynamic stability  Outcome: Progressing     Problem: Gastrointestinal - Adult  Goal: Minimal or absence of nausea and vomiting  Outcome: Progressing  Goal: Maintains or returns to baseline bowel function  Outcome: Progressing     Problem: ABCDS Injury Assessment  Goal: Absence of physical injury  Outcome: Progressing     Problem: Nutrition Deficit:  Goal: Optimize nutritional status  Outcome: Progressing     Problem: Neurosensory - Adult  Goal: Achieves stable or improved neurological status  Outcome: Progressing  Goal: Absence of seizures  Outcome: Progressing  Goal: Achieves maximal functionality and self care  Outcome: Progressing     Problem: Skin/Tissue Integrity - Adult  Goal: Skin integrity remains intact  Description: 1.  Monitor for areas of redness and/or skin breakdown  2.  Assess vascular access sites hourly  3.  Every 4-6 hours minimum:  Change oxygen saturation probe site  4.  Every 4-6 hours:  If on nasal

## 2025-06-28 VITALS
HEIGHT: 60 IN | DIASTOLIC BLOOD PRESSURE: 72 MMHG | TEMPERATURE: 98.2 F | SYSTOLIC BLOOD PRESSURE: 132 MMHG | HEART RATE: 90 BPM | WEIGHT: 293 LBS | BODY MASS INDEX: 57.52 KG/M2 | OXYGEN SATURATION: 96 % | RESPIRATION RATE: 28 BRPM

## 2025-06-28 PROCEDURE — 6370000000 HC RX 637 (ALT 250 FOR IP)

## 2025-06-28 PROCEDURE — 6370000000 HC RX 637 (ALT 250 FOR IP): Performed by: INTERNAL MEDICINE

## 2025-06-28 PROCEDURE — 99233 SBSQ HOSP IP/OBS HIGH 50: CPT | Performed by: INTERNAL MEDICINE

## 2025-06-28 PROCEDURE — 6360000002 HC RX W HCPCS

## 2025-06-28 PROCEDURE — 6370000000 HC RX 637 (ALT 250 FOR IP): Performed by: HOSPITALIST

## 2025-06-28 RX ADMIN — OXYCODONE HYDROCHLORIDE AND ACETAMINOPHEN 1 TABLET: 5; 325 TABLET ORAL at 04:05

## 2025-06-28 RX ADMIN — FENTANYL CITRATE 25 MCG: 50 INJECTION INTRAMUSCULAR; INTRAVENOUS at 05:23

## 2025-06-28 RX ADMIN — WHITE PETROLATUM: 1.75 OINTMENT TOPICAL at 09:12

## 2025-06-28 RX ADMIN — SERTRALINE HYDROCHLORIDE 100 MG: 50 TABLET ORAL at 09:12

## 2025-06-28 RX ADMIN — PANTOPRAZOLE SODIUM 40 MG: 40 TABLET, DELAYED RELEASE ORAL at 05:23

## 2025-06-28 RX ADMIN — HEPARIN SODIUM 5000 UNITS: 5000 INJECTION INTRAVENOUS; SUBCUTANEOUS at 05:23

## 2025-06-28 RX ADMIN — AMOXICILLIN 500 MG: 500 CAPSULE ORAL at 05:23

## 2025-06-28 ASSESSMENT — PAIN SCALES - GENERAL
PAINLEVEL_OUTOF10: 10
PAINLEVEL_OUTOF10: 9
PAINLEVEL_OUTOF10: 10
PAINLEVEL_OUTOF10: 7

## 2025-06-28 ASSESSMENT — PAIN DESCRIPTION - ORIENTATION: ORIENTATION: LEFT

## 2025-06-28 ASSESSMENT — PAIN DESCRIPTION - DESCRIPTORS: DESCRIPTORS: ACHING;DISCOMFORT;THROBBING

## 2025-06-28 ASSESSMENT — PAIN DESCRIPTION - LOCATION: LOCATION: ABDOMEN;LEG

## 2025-06-28 NOTE — PROGRESS NOTES
[facility]  Internal Medicine Teaching Residency Program  Inpatient Daily Progress Note  ______________________________________________________________________________    Patient: Vannesa Dasilva  YOB: 1985   MRN:[id number]    Acct: [id number]     Room: 0539/0539-01  Admit date: 6/16/2025  Today's date: 06/28/25  Number of days in the hospital: 12    SUBJECTIVE   Admitting Diagnosis: Sepsis (HCC)  CC: Left leg pain and swelling    Pt examined at bedside. Chart & results reviewed.   No acute events overnight    Yesterday patient was complaining of abdominal pain and x-ray KUB was done which showed colonic ileus or distal colonic obstruction.  General surgery was consulted, will appreciate recommendations.  However patient had 3 bowel movements overnight.  Denies any abdominal pain this morning.    Otherwise patient is hemodynamically stable with blood pressure 149/59, afebrile currently on room air.    Morning labs pending    Blood culture 6/16/2025-2 /2 positive for Streptococcus pyogenes    MRI left lower extremity-extensive cellulitis.  Probable superficial fasciitis.  Concern for osteomyelitis of proximal tibia.  MRI left ankle-extensive cellulitis.  Negative for osteomyelitis in ankle  - CT left tibia-fibula 6/20/2025-concerning for abscess    Plan  Continue amoxicillin 500 Mg every 8 hours.  Stop date 7/4/2025  -Start IV Venofer for iron deficiency, will switch to oral iron pills on discharge  Continue dry dressing and Ace wrapping of left leg  -Patient had a bowel movement, no concern for bowel obstruction at this time.  -Will discharge patient to [facility]  BRIEF HISTORY     The patient is a pleasant 39 y.o. female with a PMHx significant for      Bilateral lower extremity lymphadenopathy  Anxiety/depression  Chronic iron deficiency anemia  Obesity hypoventilation syndrome     presents with a chief complaint of left leg pain and swelling.  According to  \"CKMB\", \"CKMBINDEX\", \"TROPONINI\" in the last 72 hours.    Invalid input(s): \"CKTOTAL;3\"  FASTING LIPID PANEL:  Lab Results   Component Value Date    CHOL 62 06/17/2025    HDL 10 (L) 06/17/2025    TRIG 252 (H) 06/17/2025     LIVER PROFILE:   Recent Labs     06/26/25  0647 06/27/25  0657   AST 74* 65*   ALT 96* 81*   BILITOT 0.6 0.6   ALKPHOS 82 82      MICROBIOLOGY:   Lab Results   Component Value Date/Time    CULTURE NO GROWTH 5 DAYS 06/19/2025 06:19 AM       Imaging:    CT CHEST PULMONARY EMBOLISM W CONTRAST  Result Date: 6/17/2025  1. Limited evaluation of the pulmonary arteries due to poor opacification. No evidence for large central embolism.  If there remains high clinical suspicion for acute embolism, consider V/Q scan or repeat CT imaging. 2. No acute airspace disease identified. 3. No acute inflammatory process identified in the abdomen or pelvis. 4. Mild hepatosplenomegaly. Mild hepatic steatosis.     CT ABDOMEN PELVIS W IV CONTRAST Additional Contrast? None  Result Date: 6/17/2025  1. Limited evaluation of the pulmonary arteries due to poor opacification. No evidence for large central embolism.  If there remains high clinical suspicion for acute embolism, consider V/Q scan or repeat CT imaging. 2. No acute airspace disease identified. 3. No acute inflammatory process identified in the abdomen or pelvis. 4. Mild hepatosplenomegaly. Mild hepatic steatosis.     XR CHEST PORTABLE  Result Date: 6/16/2025  No acute cardiopulmonary findings     XR KNEE LEFT (3 VIEWS)  Result Date: 6/16/2025  No acute bony or joint abnormality     XR TIBIA FIBULA LEFT (2 VIEWS)  Result Date: 6/16/2025  No acute bony or joint abnormality       ASSESSMENT & PLAN     Assessment and Plan:    Principal Problem:    Sepsis (HCC)  Active Problems:    Anemia    History of heroin use    Shortness of breath    Left leg swelling    Electrolyte imbalance    Elevated lactic acid level    Anxiety    Depression    Chronic acquired lymphedema

## 2025-06-28 NOTE — PLAN OF CARE
Problem: Discharge Planning  Goal: Discharge to home or other facility with appropriate resources  Outcome: Completed     Problem: Pain  Goal: Verbalizes/displays adequate comfort level or baseline comfort level  Outcome: Completed     Problem: Skin/Tissue Integrity  Goal: Skin integrity remains intact  Description: 1.  Monitor for areas of redness and/or skin breakdown  2.  Assess vascular access sites hourly  3.  Every 4-6 hours minimum:  Change oxygen saturation probe site  4.  Every 4-6 hours:  If on nasal continuous positive airway pressure, respiratory therapy assess nares and determine need for appliance change or resting period  Outcome: Completed     Problem: Safety - Adult  Goal: Free from fall injury  Outcome: Completed     Problem: Chronic Conditions and Co-morbidities  Goal: Patient's chronic conditions and co-morbidity symptoms are monitored and maintained or improved  Outcome: Completed     Problem: Cardiovascular - Adult  Goal: Maintains optimal cardiac output and hemodynamic stability  Outcome: Completed     Problem: Gastrointestinal - Adult  Goal: Minimal or absence of nausea and vomiting  Outcome: Completed  Goal: Maintains or returns to baseline bowel function  Outcome: Completed     Problem: ABCDS Injury Assessment  Goal: Absence of physical injury  Outcome: Completed     Problem: Nutrition Deficit:  Goal: Optimize nutritional status  Outcome: Completed     Problem: Neurosensory - Adult  Goal: Achieves stable or improved neurological status  Outcome: Completed  Goal: Absence of seizures  Outcome: Completed  Goal: Achieves maximal functionality and self care  Outcome: Completed     Problem: Skin/Tissue Integrity - Adult  Goal: Skin integrity remains intact  Description: 1.  Monitor for areas of redness and/or skin breakdown  2.  Assess vascular access sites hourly  3.  Every 4-6 hours minimum:  Change oxygen saturation probe site  4.  Every 4-6 hours:  If on nasal continuous positive airway

## 2025-06-28 NOTE — PLAN OF CARE
Problem: Discharge Planning  Goal: Discharge to home or other facility with appropriate resources  6/27/2025 2207 by Bonnie Carlisle RN  Outcome: Progressing  6/27/2025 1556 by Laura Perez RN  Outcome: Progressing     Problem: Pain  Goal: Verbalizes/displays adequate comfort level or baseline comfort level  6/27/2025 2207 by Bonnie Carlisle RN  Outcome: Progressing  6/27/2025 1556 by Laura Perez RN  Outcome: Progressing     Problem: Skin/Tissue Integrity  Goal: Skin integrity remains intact  Description: 1.  Monitor for areas of redness and/or skin breakdown  2.  Assess vascular access sites hourly  3.  Every 4-6 hours minimum:  Change oxygen saturation probe site  4.  Every 4-6 hours:  If on nasal continuous positive airway pressure, respiratory therapy assess nares and determine need for appliance change or resting period  6/27/2025 2207 by Bonnie Carlisle RN  Outcome: Progressing  6/27/2025 1556 by Laura Perez RN  Outcome: Progressing     Problem: Safety - Adult  Goal: Free from fall injury  6/27/2025 2207 by Bonnie Carlisle RN  Outcome: Progressing  6/27/2025 1556 by Laura Perez RN  Outcome: Progressing     Problem: Chronic Conditions and Co-morbidities  Goal: Patient's chronic conditions and co-morbidity symptoms are monitored and maintained or improved  6/27/2025 2207 by Bonnie Carlisle RN  Outcome: Progressing  6/27/2025 1556 by Laura Perez RN  Outcome: Progressing     Problem: Cardiovascular - Adult  Goal: Maintains optimal cardiac output and hemodynamic stability  6/27/2025 2207 by Bonnie Carlisle RN  Outcome: Progressing  6/27/2025 1556 by Laura Perez RN  Outcome: Progressing     Problem: Gastrointestinal - Adult  Goal: Minimal or absence of nausea and vomiting  6/27/2025 2207 by Bonnie Carlisle RN  Outcome: Progressing  6/27/2025 1556 by Laura Perez RN  Outcome: Progressing  Goal: Maintains or returns to baseline bowel function  6/27/2025 2207 by Bonnie Carlisle  RN  Outcome: Progressing  6/27/2025 1556 by Laura Perez RN  Outcome: Progressing     Problem: ABCDS Injury Assessment  Goal: Absence of physical injury  6/27/2025 2207 by Bonnie Carlisle RN  Outcome: Progressing  6/27/2025 1556 by Laura Perez RN  Outcome: Progressing     Problem: Nutrition Deficit:  Goal: Optimize nutritional status  6/27/2025 2207 by Bonnie Carlisle RN  Outcome: Progressing  6/27/2025 1556 by Laura Perez RN  Outcome: Progressing     Problem: Neurosensory - Adult  Goal: Achieves stable or improved neurological status  6/27/2025 2207 by Bonnie Carlisle RN  Outcome: Progressing  6/27/2025 1556 by Laura Perez RN  Outcome: Progressing  Goal: Absence of seizures  6/27/2025 2207 by Bonnie Carlisle RN  Outcome: Progressing  6/27/2025 1556 by Laura Perez RN  Outcome: Progressing  Goal: Achieves maximal functionality and self care  6/27/2025 2207 by Bonnie Carlisle RN  Outcome: Progressing  6/27/2025 1556 by Laura Perez RN  Outcome: Progressing     Problem: Skin/Tissue Integrity - Adult  Goal: Skin integrity remains intact  Description: 1.  Monitor for areas of redness and/or skin breakdown  2.  Assess vascular access sites hourly  3.  Every 4-6 hours minimum:  Change oxygen saturation probe site  4.  Every 4-6 hours:  If on nasal continuous positive airway pressure, respiratory therapy assess nares and determine need for appliance change or resting period  6/27/2025 2207 by Bonnie Carlisle RN  Outcome: Progressing  6/27/2025 1556 by Laura Perez RN  Outcome: Progressing  Goal: Incisions, wounds, or drain sites healing without S/S of infection  6/27/2025 2207 by Bonnie Carlisle RN  Outcome: Progressing  6/27/2025 1556 by Laura Perez RN  Outcome: Progressing  Goal: Oral mucous membranes remain intact  6/27/2025 2207 by Bonnie Carlisle RN  Outcome: Progressing  6/27/2025 1556 by Laura Perez RN  Outcome: Progressing     Problem: Musculoskeletal - Adult  Goal: Return

## 2025-06-28 NOTE — PLAN OF CARE
Pt having bowel function. General surgery was on for ileus management. Ileus resolved. General surgery to sign off. Please contact the pager with questions or concerns.      Bhakti Linton, DO   General Surgery PGY-4  6/28/25 4:06 PM

## 2025-06-28 NOTE — CARE COORDINATION
Case Management   Daily Progress Note       Patient Name: Vannesa Dasilva                   YOB: 1985  Diagnosis: Shortness of breath [R06.02]  Hypokalemia [E87.6]  HOMA (acute kidney injury) [N17.9]  Sepsis (HCC) [A41.9]  Anemia, unspecified type [D64.9]  Sepsis, due to unspecified organism, unspecified whether acute organ dysfunction present (HCC) [A41.9]                       GMLOS: 3.5 days  Length of Stay: 12  days    Anticipated Discharge Date: Ready for discharge    Readmission Risk (Low < 19, Mod (19-27), High > 27): Readmission Risk Score: 17.5        Current Transitional Plan    [] Home Independently    [] Home with HC    [x] Skilled Nursing Facility    [] Acute Rehabilitation    [] Long Term Acute Care (LTAC)    [] Other:     Plan for the Stay (Medical Management) :          Workflow Continuation (Additional Notes) :  [facility]. 1113 left messages with Rachel and Khushi at [facility] to see if patient can come today.  1311 message from Rachel at [facility]. Can accept today. 1322 called [facility] and talked with Kimmy/DEVON at 4 PM today. Notified Khsuhi at [facility] of discharge time. 1450 faxed AVS to [facility].      Priscilla Saab RN  June 28, 2025

## 2025-06-28 NOTE — CARE COORDINATION
Discharge Report    [facility]  Clinical Case Management Department  Written by: Priscilla Saab RN    Patient Name: Vannesa Dasilva  Attending Provider: Sarah Mcgregor MD  Admit Date: 2025  3:27 PM  MRN: [id number]  Account: [id number]                     : 1985  Discharge Date: 2025      Disposition: [facility]    Priscilla Saab RN

## 2025-06-30 ENCOUNTER — TELEPHONE (OUTPATIENT)
Dept: GASTROENTEROLOGY | Age: 40
End: 2025-06-30

## 2025-06-30 NOTE — TELEPHONE ENCOUNTER
----- Message from Santa MCCLURE sent at 6/19/2025 12:34 PM EDT -----  Regarding: FW: follow up    ----- Message -----  From: Akhil Joseph APRN - CNP  Sent: 6/19/2025  11:50 AM EDT  To: Santa Burnett  Subject: follow up                                        Patient will need to follow-up with Dr. Newell in 2 to 3 weeks to review outstanding liver serologies  Anticipate discharge in 3 to 5 days because she is dealing with rhabdo, possible infection etc.    Jewel

## 2025-06-30 NOTE — TELEPHONE ENCOUNTER
[facility] [place] called requesting Hospital follow up appointment. Noted,  follow up 2 to 3 weeks to review outstanding liver serologies and ongoing progress.     Please return call to discuss [phone] ask for Luna    Thank you

## 2025-06-30 NOTE — TELEPHONE ENCOUNTER
Writer attempted to abundio an appt for a f/u for outstanding liver serologies, but the patient's phone is disconnected.

## 2025-07-01 NOTE — TELEPHONE ENCOUNTER
Luna from [facility] [place] calling to make a follow up appointment for patient, please call Luna at [phone] psc/sc

## 2025-07-01 NOTE — TELEPHONE ENCOUNTER
Writer called Luna at the facility the pt is at, but writer had to LVM for them to call back to abundio a f/u.

## 2025-07-02 NOTE — DISCHARGE SUMMARY
[facility]     Department of Internal Medicine - Staff Internal Medicine Teaching Service    INPATIENT DISCHARGE SUMMARY      Patient Identification:  Vannesa Dasilva is a 40 y.o. female.  :  1985  MRN: [id number]     Acct: [id number]   PCP: Kasandra Gaona MD  Admit Date:  2025  Discharge date and time: 2025  4:42 PM   Attending Provider: No att. providers found                                     ACTIVE DISCHARGE DIAGNOSES     Hospital Problem Lists:  Principal Problem:    Sepsis (HCC)  Active Problems:    Anemia    History of heroin use    Shortness of breath    Left leg swelling    Electrolyte imbalance    Elevated lactic acid level    Anxiety    Depression    Chronic acquired lymphedema    HOMA (acute kidney injury)    Hypokalemia    Sepsis due to Streptococcus pyogenes (HCC)    Traumatic rhabdomyolysis    Nausea and vomiting    Pain of left calf  Resolved Problems:    * No resolved hospital problems. *      HOSPITAL STAY     Brief Inpatient course:   Vannesa Dasilva is a 40 y.o. female with a PMHx significant for      Bilateral lower extremity lymphadenopathy  Anxiety/depression  Chronic iron deficiency anemia  Obesity hypoventilation syndrome  Presented with complaint of left leg swelling and pain.  As per patient swelling and pain started after she fell down.  Patient was found to have rhabdomyolysis.  She was treated with IV fluids for that.  Patient was septic and her blood culture was positive for Streptococcus pyogenes.    Infectious disease was consulted and was started on IV antibiotic.  Patient received IV antibiotic(ceftriaxone) which was later on switched to oral antibiotic amoxicillin on discharge.  Patient got better with IV antibiotics and IV fluids for rhabdomyolysis and Streptococcus pyogenes infection.  After treatment patient worked up with PT OT and was recommended to discharge to SNF.  Patient was hemodynamically and vital was stable at the

## 2025-07-09 NOTE — DISCHARGE INSTRUCTIONS
[facility] WOUND and HYPERBARIC TREATMENT  CENTER      Visit  Discharge Instructions / Physician Orders  DATE:7/10/25     Home Care:     SUPPLIES ORDERED THRU:  NA                   DATE LAST SUPPLIED     Wound Location:  Bilateral Lower legs     Cleanse with: Liquid antibacterial soap and water, rinse well      Dressing Orders:  left lower leg , Primary dressing   fibracol, silvercel, kerramax, zinc layer, 3 layer wrap.                     Frequency:  keep dry and in place,     Additional Orders: Increase protein to diet (meat, cheese, eggs, fish, peanut butter, nuts and beans)  Multivitamin daily  Elevate legs if swollen or wearing compression when sitting  OFFLOADING [] YES  TYPE:                  [] NA    Vascular studies ordered, please have done.  3 layer wrap needs removed before testing.    Weekly wound care visits until determined otherwise.    Antibiotic therapy-wound care related YES [] NO [] NA[]  DRUG-                                                             Duration-             Script sent to-                      on (date)  MY CHART []     Smart Device  []     HYPERBARIC TREATMENT-                TREATMENT #                          Your next appointment with the Wound Care Center is in 1 week                                                                                                   (Please note your next appointment above and if you are unable to keep, kindly give a 24 hour notice. Thank you.)  If more than 15 min late we cannot guarantee you will be seen due to clinician schedule    Per Policy,  3 or more cancellations or no show may result in dismissal from program  If you experience any of the following, please call the Wound Care Center during business hours:  [phone]     * Increase in Pain  * Temperature over 101  * Increase in drainage from your wound  * Drainage with a foul odor  * Bleeding  * Increase in swelling  * Need for compression bandage changes due to slippage,

## 2025-07-10 ENCOUNTER — HOSPITAL ENCOUNTER (OUTPATIENT)
Dept: WOUND CARE | Age: 40
Discharge: HOME | End: 2025-07-10
Payer: MEDICAID

## 2025-07-10 VITALS
HEART RATE: 96 BPM | SYSTOLIC BLOOD PRESSURE: 158 MMHG | DIASTOLIC BLOOD PRESSURE: 90 MMHG | RESPIRATION RATE: 20 BRPM | TEMPERATURE: 99.1 F

## 2025-07-10 DIAGNOSIS — R60.0 EDEMA OF BOTH LOWER EXTREMITIES: ICD-10-CM

## 2025-07-10 DIAGNOSIS — I89.0 CHRONIC ACQUIRED LYMPHEDEMA: ICD-10-CM

## 2025-07-10 DIAGNOSIS — L97.929 VENOUS ULCER OF LEFT LEG (HCC): Primary | ICD-10-CM

## 2025-07-10 DIAGNOSIS — R09.89 DECREASED PEDAL PULSES: ICD-10-CM

## 2025-07-10 DIAGNOSIS — Z72.0 TOBACCO ABUSE: ICD-10-CM

## 2025-07-10 DIAGNOSIS — I83.029 VENOUS ULCER OF LEFT LEG (HCC): Primary | ICD-10-CM

## 2025-07-10 PROCEDURE — 99213 OFFICE O/P EST LOW 20 MIN: CPT

## 2025-07-10 PROCEDURE — 11042 DBRDMT SUBQ TIS 1ST 20SQCM/<: CPT

## 2025-07-10 RX ORDER — AMOXICILLIN 500 MG/1
500 CAPSULE ORAL 3 TIMES DAILY
COMMUNITY

## 2025-07-10 RX ORDER — LIDOCAINE HYDROCHLORIDE 20 MG/ML
JELLY TOPICAL PRN
OUTPATIENT
Start: 2025-07-10

## 2025-07-10 RX ORDER — LIDOCAINE HYDROCHLORIDE 40 MG/ML
SOLUTION TOPICAL PRN
OUTPATIENT
Start: 2025-07-10

## 2025-07-10 RX ORDER — BUPROPION HYDROCHLORIDE 300 MG/1
300 TABLET ORAL EVERY MORNING
COMMUNITY

## 2025-07-10 ASSESSMENT — PAIN SCALES - GENERAL: PAINLEVEL_OUTOF10: 0

## 2025-07-10 ASSESSMENT — ENCOUNTER SYMPTOMS
VOMITING: 0
DIARRHEA: 0
COUGH: 0
NAUSEA: 0
RHINORRHEA: 0
SHORTNESS OF BREATH: 0

## 2025-07-10 NOTE — PROGRESS NOTES
[facility] [place] [facility]   Progress Note and Procedure Note      Vannesa Dasilva  MEDICAL RECORD NUMBER:  [id number]  AGE: 40 y.o.   GENDER: female  : 1985  EPISODE DATE:  7/10/2025    Subjective:     Chief Complaint   Patient presents with    Wound Check     Left lower leg         HISTORY of PRESENT ILLNESS HPI     Vannesa Dasilva is a 40 y.o. female who presents today for wound/ulcer evaluation.   History of Wound Context: presents for initial wound clinic evaluation of left posterior leg wound. Had unna boot in place. At home without home care.   Admit 2025-2025 for sepsis after fall with rhabdomyolysis. Had IV antibiotics and fluids, discharged to SNF on oral antibiotics.   Wound/Ulcer Pain Timing/Severity: intermittent  Quality of pain: burning  Severity:  2 / 10   Modifying Factors: None  Associated Signs/Symptoms: none    Ulcer Identification:  Ulcer Type: venous  Contributing Factors: edema, venous stasis, lymphedema, decreased mobility, obesity, smoking, poor hygiene, and substance abuse         PAST MEDICAL HISTORY        Diagnosis Date    CHF (congestive heart failure) (HCC)     Disease of blood and blood forming organ     History of heroin use 2020    Lymphedema     Psychiatric problem        PAST SURGICAL HISTORY    Past Surgical History:   Procedure Laterality Date    TUBAL LIGATION         FAMILY HISTORY    History reviewed. No pertinent family history.    SOCIAL HISTORY    Social History     Tobacco Use    Smoking status: Every Day     Current packs/day: 0.50     Types: Cigarettes    Smokeless tobacco: Never   Vaping Use    Vaping status: Never Used   Substance Use Topics    Alcohol use: No    Drug use: Not Currently     Comment: on Methadone       ALLERGIES    Allergies   Allergen Reactions    Famotidine Angioedema     Resolved with benadryl    Ibuprofen Hives     Facial swelling and hives reported by pt.        MEDICATIONS    Current Outpatient Medications

## 2025-07-10 NOTE — PROGRESS NOTES
Multilayer Compression Wrap   (Not Unna) Below the Knee    NAME:  Vannesa Dasilva  YOB: 1985  MEDICAL RECORD NUMBER:  [id number]  DATE:  7/10/2025    Multilayer compression wrap: Removed old Multilayer wrap if indicated and wash leg with mild soap/water.  Applied moisturizing agent to dry skin as needed.   Applied primary and secondary dressing as ordered.  Applied multilayered dressing below the knee to left lower leg.  Instructed patient/caregiver not to remove dressing and to keep it clean and dry.   Instructed patient/caregiver on complications to report to provider, such as pain, numbness in toes, heavy drainage, and slippage of dressing.  Instructed patient on purpose of compression dressing and on activity and exercise recommendations.      Electronically signed by Joanna Greer RN on 7/10/2025 at 9:37 AM

## 2025-07-16 NOTE — DISCHARGE INSTRUCTIONS
[facility] WOUND and HYPERBARIC TREATMENT  CENTER                            Visit  Discharge Instructions / Physician Orders  DATE:7/17/25     Home Care:     SUPPLIES ORDERED THRU:  NA                   DATE LAST SUPPLIED     Wound Location:  Bilateral Lower legs     Cleanse with: Liquid antibacterial soap and water, rinse well      Dressing Orders:  left lower leg , Primary dressing   fibracol, silvercel, kerramax, zinc layer, 3 layer wrap.                     Frequency:  keep dry and in place,     Additional Orders: Increase protein to diet (meat, cheese, eggs, fish, peanut butter, nuts and beans)  Multivitamin daily  Elevate legs if swollen or wearing compression when sitting  OFFLOADING [] YES  TYPE:                  [] NA     Vascular studies ordered, please have done.  3 layer wrap needs removed before testing.     Weekly wound care visits until determined otherwise.     Antibiotic therapy-wound care related YES [] NO [] NA[]  DRUG-                                                             Duration-             Script sent to-                      on (date)  MY CHART []     Smart Device  []      HYPERBARIC TREATMENT-                TREATMENT #                          Your next appointment with the Wound Care Center is in 1 week                                                                                                   (Please note your next appointment above and if you are unable to keep, kindly give a 24 hour notice. Thank you.)  If more than 15 min late we cannot guarantee you will be seen due to clinician schedule     Per Policy,  3 or more cancellations or no show may result in dismissal from program  If you experience any of the following, please call the Wound Care Center during business hours:  [phone]     * Increase in Pain  * Temperature over 101  * Increase in drainage from your wound  * Drainage with a foul odor  * Bleeding  * Increase in swelling  * Need for compression bandage

## 2025-07-17 ENCOUNTER — FOLLOWUP TELEPHONE ENCOUNTER (OUTPATIENT)
Dept: WOUND CARE | Age: 40
End: 2025-07-17

## 2025-07-17 ENCOUNTER — HOSPITAL ENCOUNTER (OUTPATIENT)
Dept: WOUND CARE | Age: 40
Discharge: HOME | End: 2025-07-17

## 2025-07-17 NOTE — PROGRESS NOTES
Pt no called no showed for [facility] Wound care apt today. Tried to call pt and call can not be processed/go through.

## 2025-07-30 NOTE — DISCHARGE INSTRUCTIONS
[facility]                            Visit  Discharge Instructions / Physician Orders  DATE:7/31/25     Home Care:     SUPPLIES ORDERED THRU:  NA                   DATE LAST SUPPLIED     Wound Location:  Bilateral Lower legs     Cleanse with: Liquid antibacterial soap and water, rinse well      Dressing Orders:  left lower leg , ace wrap today in wound care, patient to ER today due to redness, skin hot to touch, swelling.                     Frequency:  keep dry and in place,     Additional Orders: Increase protein to diet (meat, cheese, eggs, fish, peanut butter, nuts and beans)  Multivitamin daily  Elevate legs if swollen or wearing compression when sitting  OFFLOADING [] YES  TYPE:                  [] NA     Vascular studies ordered, please have done.  3 layer wrap needs removed before testing.     Weekly wound care visits until determined otherwise.     Antibiotic therapy-wound care related YES [] NO [] NA[]  DRUG-                                                             Duration-             Script sent to-                      on (date)  MY CHART []     Smart Device  []      HYPERBARIC TREATMENT-                TREATMENT #                          Your next appointment with the Wound Care Center is in call after hospital.                                                                                                 (Please note your next appointment above and if you are unable to keep, kindly give a 24 hour notice. Thank you.)  If more than 15 min late we cannot guarantee you will be seen due to clinician schedule     Per Policy,  3 or more cancellations or no show may result in dismissal from program  If you experience any of the following, please call the Wound Care Center during business hours:  [phone]     * Increase in Pain  * Temperature over 101  * Increase in drainage from your wound  * Drainage with a foul odor  * Bleeding  * Increase in swelling  *

## 2025-07-31 ENCOUNTER — HOSPITAL ENCOUNTER (OUTPATIENT)
Dept: WOUND CARE | Age: 40
Discharge: HOME | End: 2025-07-31
Payer: MEDICAID

## 2025-07-31 VITALS
SYSTOLIC BLOOD PRESSURE: 142 MMHG | RESPIRATION RATE: 19 BRPM | TEMPERATURE: 97.6 F | BODY MASS INDEX: 51.83 KG/M2 | WEIGHT: 264 LBS | HEIGHT: 60 IN | HEART RATE: 82 BPM | DIASTOLIC BLOOD PRESSURE: 98 MMHG

## 2025-07-31 DIAGNOSIS — L97.929 VENOUS ULCER OF LEFT LEG (HCC): Primary | ICD-10-CM

## 2025-07-31 DIAGNOSIS — R60.0 EDEMA OF BOTH LOWER EXTREMITIES: ICD-10-CM

## 2025-07-31 DIAGNOSIS — I83.029 VENOUS ULCER OF LEFT LEG (HCC): Primary | ICD-10-CM

## 2025-07-31 DIAGNOSIS — L03.116 CELLULITIS OF LEFT ANTERIOR LOWER LEG: ICD-10-CM

## 2025-07-31 DIAGNOSIS — Z72.0 TOBACCO ABUSE: ICD-10-CM

## 2025-07-31 DIAGNOSIS — I89.0 CHRONIC ACQUIRED LYMPHEDEMA: ICD-10-CM

## 2025-07-31 PROCEDURE — 99213 OFFICE O/P EST LOW 20 MIN: CPT | Performed by: NURSE PRACTITIONER

## 2025-07-31 PROCEDURE — 99213 OFFICE O/P EST LOW 20 MIN: CPT

## 2025-07-31 RX ORDER — LIDOCAINE HYDROCHLORIDE 40 MG/ML
SOLUTION TOPICAL PRN
OUTPATIENT
Start: 2025-07-31

## 2025-07-31 RX ORDER — LIDOCAINE HYDROCHLORIDE 20 MG/ML
JELLY TOPICAL PRN
OUTPATIENT
Start: 2025-07-31

## 2025-07-31 RX ORDER — LIDOCAINE HYDROCHLORIDE 40 MG/ML
SOLUTION TOPICAL PRN
Status: DISCONTINUED | OUTPATIENT
Start: 2025-07-31 | End: 2025-08-01 | Stop reason: HOSPADM

## 2025-07-31 RX ADMIN — LIDOCAINE HYDROCHLORIDE 5 ML: 40 SOLUTION TOPICAL at 14:37

## 2025-07-31 ASSESSMENT — ENCOUNTER SYMPTOMS
COLOR CHANGE: 1
DIARRHEA: 0
SHORTNESS OF BREATH: 0
RHINORRHEA: 0
COUGH: 0
NAUSEA: 0
VOMITING: 0

## 2025-07-31 ASSESSMENT — PAIN - FUNCTIONAL ASSESSMENT: PAIN_FUNCTIONAL_ASSESSMENT: PREVENTS OR INTERFERES SOME ACTIVE ACTIVITIES AND ADLS

## 2025-07-31 ASSESSMENT — PAIN DESCRIPTION - ORIENTATION: ORIENTATION: LEFT;ANTERIOR

## 2025-07-31 ASSESSMENT — PAIN DESCRIPTION - FREQUENCY: FREQUENCY: INTERMITTENT

## 2025-07-31 ASSESSMENT — PAIN DESCRIPTION - DESCRIPTORS: DESCRIPTORS: DISCOMFORT

## 2025-07-31 ASSESSMENT — PAIN DESCRIPTION - LOCATION: LOCATION: ANKLE

## 2025-07-31 ASSESSMENT — PAIN DESCRIPTION - PAIN TYPE: TYPE: CHRONIC PAIN

## 2025-07-31 ASSESSMENT — PAIN SCALES - GENERAL: PAINLEVEL_OUTOF10: 8

## 2025-07-31 ASSESSMENT — PAIN DESCRIPTION - ONSET: ONSET: ON-GOING

## 2025-07-31 NOTE — PROGRESS NOTES
[facility]   Progress Note and Procedure Note      Vannesa Dasilva  MEDICAL RECORD NUMBER:  [id number]  AGE: 40 y.o.   GENDER: female  : 1985  EPISODE DATE:  2025    Subjective:     Chief Complaint   Patient presents with    Wound Check     Left Lower Extremity         HISTORY of PRESENT ILLNESS HPI     Vannesa Dasilva is a 40 y.o. female who presents today for wound/ulcer evaluation.   History of Wound Context: here to follow up on left lower posterior leg wound that is healed today. She missed follow up appointments in wound clinic 2025, 2025. Last seen 7/10/2025 and has been keeping base layer of 3 layer wrap intact and re wrapping coban layer herself.   On removal of compression wrap, noted to have erythema, warmth, edema and tenderness to left anterior lower leg. Concern for possible abscess, recommend ER evaluation today.   Wound/Ulcer Pain Timing/Severity: constant  Quality of pain: tender  Severity:  8 / 10   Modifying Factors: None  Associated Signs/Symptoms: edema, erythema, and pain    Ulcer Identification:  Ulcer Type: venous  Contributing Factors: edema, venous stasis, lymphedema, decreased mobility, obesity, smoking, poor hygiene, and substance abuse         PAST MEDICAL HISTORY        Diagnosis Date    CHF (congestive heart failure) (HCC)     Disease of blood and blood forming organ     History of heroin use 2020    Lymphedema     Psychiatric problem        PAST SURGICAL HISTORY    Past Surgical History:   Procedure Laterality Date    TUBAL LIGATION         FAMILY HISTORY    History reviewed. No pertinent family history.    SOCIAL HISTORY    Social History     Tobacco Use    Smoking status: Every Day     Current packs/day: 0.50     Types: Cigarettes    Smokeless tobacco: Never   Vaping Use    Vaping status: Never Used   Substance Use Topics    Alcohol use: No    Drug use: Not Currently     Comment: on Methadone       ALLERGIES    Allergies